# Patient Record
Sex: FEMALE | Race: WHITE | NOT HISPANIC OR LATINO
[De-identification: names, ages, dates, MRNs, and addresses within clinical notes are randomized per-mention and may not be internally consistent; named-entity substitution may affect disease eponyms.]

---

## 2021-09-27 ENCOUNTER — APPOINTMENT (OUTPATIENT)
Dept: OPHTHALMOLOGY | Facility: CLINIC | Age: 81
End: 2021-09-27

## 2023-10-16 ENCOUNTER — INPATIENT (INPATIENT)
Facility: HOSPITAL | Age: 83
LOS: 6 days | Discharge: NURSING, REHAB OR LONG-TERM CARE | DRG: 324 | End: 2023-10-23
Attending: INTERNAL MEDICINE | Admitting: INTERNAL MEDICINE
Payer: MEDICARE

## 2023-10-16 VITALS
HEART RATE: 78 BPM | TEMPERATURE: 97 F | WEIGHT: 102.96 LBS | RESPIRATION RATE: 18 BRPM | OXYGEN SATURATION: 100 % | DIASTOLIC BLOOD PRESSURE: 78 MMHG | SYSTOLIC BLOOD PRESSURE: 167 MMHG

## 2023-10-16 DIAGNOSIS — I21.4 NON-ST ELEVATION (NSTEMI) MYOCARDIAL INFARCTION: ICD-10-CM

## 2023-10-16 DIAGNOSIS — Z98.890 OTHER SPECIFIED POSTPROCEDURAL STATES: Chronic | ICD-10-CM

## 2023-10-16 DIAGNOSIS — Z87.81 PERSONAL HISTORY OF (HEALED) TRAUMATIC FRACTURE: Chronic | ICD-10-CM

## 2023-10-16 DIAGNOSIS — Z86.59 PERSONAL HISTORY OF OTHER MENTAL AND BEHAVIORAL DISORDERS: ICD-10-CM

## 2023-10-16 PROBLEM — Z00.00 ENCOUNTER FOR PREVENTIVE HEALTH EXAMINATION: Status: ACTIVE | Noted: 2023-10-16

## 2023-10-16 LAB
ALBUMIN SERPL ELPH-MCNC: 3.6 G/DL — SIGNIFICANT CHANGE UP (ref 3.3–5)
ALBUMIN SERPL ELPH-MCNC: 3.6 G/DL — SIGNIFICANT CHANGE UP (ref 3.3–5)
ALP SERPL-CCNC: 85 U/L — SIGNIFICANT CHANGE UP (ref 40–120)
ALP SERPL-CCNC: 85 U/L — SIGNIFICANT CHANGE UP (ref 40–120)
ALT FLD-CCNC: 89 U/L — HIGH (ref 10–45)
ALT FLD-CCNC: 89 U/L — HIGH (ref 10–45)
ANION GAP SERPL CALC-SCNC: 11 MMOL/L — SIGNIFICANT CHANGE UP (ref 5–17)
APTT BLD: 26.9 SEC — SIGNIFICANT CHANGE UP (ref 24.5–35.6)
AST SERPL-CCNC: 61 U/L — HIGH (ref 10–40)
AST SERPL-CCNC: 61 U/L — HIGH (ref 10–40)
BASOPHILS # BLD AUTO: 0.03 K/UL — SIGNIFICANT CHANGE UP (ref 0–0.2)
BASOPHILS NFR BLD AUTO: 0.3 % — SIGNIFICANT CHANGE UP (ref 0–2)
BILIRUB DIRECT SERPL-MCNC: 0.2 MG/DL — SIGNIFICANT CHANGE UP (ref 0–0.3)
BILIRUB DIRECT SERPL-MCNC: 0.2 MG/DL — SIGNIFICANT CHANGE UP (ref 0–0.3)
BILIRUB INDIRECT FLD-MCNC: 0 MG/DL — LOW (ref 0.2–1)
BILIRUB INDIRECT FLD-MCNC: 0 MG/DL — LOW (ref 0.2–1)
BILIRUB SERPL-MCNC: 0.2 MG/DL — SIGNIFICANT CHANGE UP (ref 0.2–1.2)
BILIRUB SERPL-MCNC: 0.2 MG/DL — SIGNIFICANT CHANGE UP (ref 0.2–1.2)
BUN SERPL-MCNC: 23 MG/DL — SIGNIFICANT CHANGE UP (ref 7–23)
CALCIUM SERPL-MCNC: 8.9 MG/DL — SIGNIFICANT CHANGE UP (ref 8.4–10.5)
CHLORIDE SERPL-SCNC: 106 MMOL/L — SIGNIFICANT CHANGE UP (ref 96–108)
CK MB CFR SERPL CALC: 4 NG/ML — SIGNIFICANT CHANGE UP (ref 0–6.7)
CK MB CFR SERPL CALC: 4 NG/ML — SIGNIFICANT CHANGE UP (ref 0–6.7)
CK MB CFR SERPL CALC: 8.4 NG/ML — HIGH (ref 0–6.7)
CK SERPL-CCNC: 242 U/L — HIGH (ref 25–170)
CK SERPL-CCNC: 94 U/L — SIGNIFICANT CHANGE UP (ref 25–170)
CK SERPL-CCNC: 94 U/L — SIGNIFICANT CHANGE UP (ref 25–170)
CO2 SERPL-SCNC: 26 MMOL/L — SIGNIFICANT CHANGE UP (ref 22–31)
CREAT SERPL-MCNC: 0.64 MG/DL — SIGNIFICANT CHANGE UP (ref 0.5–1.3)
CRP SERPL-MCNC: <3 MG/L — SIGNIFICANT CHANGE UP (ref 0–4)
CRP SERPL-MCNC: <3 MG/L — SIGNIFICANT CHANGE UP (ref 0–4)
EGFR: 87 ML/MIN/1.73M2 — SIGNIFICANT CHANGE UP
EOSINOPHIL # BLD AUTO: 0.01 K/UL — SIGNIFICANT CHANGE UP (ref 0–0.5)
EOSINOPHIL NFR BLD AUTO: 0.1 % — SIGNIFICANT CHANGE UP (ref 0–6)
ERYTHROCYTE [SEDIMENTATION RATE] IN BLOOD: 6 MM/HR — SIGNIFICANT CHANGE UP
ERYTHROCYTE [SEDIMENTATION RATE] IN BLOOD: 6 MM/HR — SIGNIFICANT CHANGE UP
GLUCOSE SERPL-MCNC: 110 MG/DL — HIGH (ref 70–99)
HCT VFR BLD CALC: 43.7 % — SIGNIFICANT CHANGE UP (ref 34.5–45)
HGB BLD-MCNC: 14.4 G/DL — SIGNIFICANT CHANGE UP (ref 11.5–15.5)
IMM GRANULOCYTES NFR BLD AUTO: 1.1 % — HIGH (ref 0–0.9)
INR BLD: 0.93 — SIGNIFICANT CHANGE UP (ref 0.85–1.18)
LYMPHOCYTES # BLD AUTO: 0.89 K/UL — LOW (ref 1–3.3)
LYMPHOCYTES # BLD AUTO: 9.5 % — LOW (ref 13–44)
MCHC RBC-ENTMCNC: 31.4 PG — SIGNIFICANT CHANGE UP (ref 27–34)
MCHC RBC-ENTMCNC: 33 GM/DL — SIGNIFICANT CHANGE UP (ref 32–36)
MCV RBC AUTO: 95.4 FL — SIGNIFICANT CHANGE UP (ref 80–100)
MONOCYTES # BLD AUTO: 0.49 K/UL — SIGNIFICANT CHANGE UP (ref 0–0.9)
MONOCYTES NFR BLD AUTO: 5.2 % — SIGNIFICANT CHANGE UP (ref 2–14)
NEUTROPHILS # BLD AUTO: 7.86 K/UL — HIGH (ref 1.8–7.4)
NEUTROPHILS NFR BLD AUTO: 83.8 % — HIGH (ref 43–77)
NRBC # BLD: 0 /100 WBCS — SIGNIFICANT CHANGE UP (ref 0–0)
NT-PROBNP SERPL-SCNC: 324 PG/ML — HIGH (ref 0–300)
PLATELET # BLD AUTO: 261 K/UL — SIGNIFICANT CHANGE UP (ref 150–400)
POTASSIUM SERPL-MCNC: 3.9 MMOL/L — SIGNIFICANT CHANGE UP (ref 3.5–5.3)
POTASSIUM SERPL-SCNC: 3.9 MMOL/L — SIGNIFICANT CHANGE UP (ref 3.5–5.3)
PROT SERPL-MCNC: 6.2 G/DL — SIGNIFICANT CHANGE UP (ref 6–8.3)
PROT SERPL-MCNC: 6.2 G/DL — SIGNIFICANT CHANGE UP (ref 6–8.3)
PROTHROM AB SERPL-ACNC: 10.6 SEC — SIGNIFICANT CHANGE UP (ref 9.5–13)
RBC # BLD: 4.58 M/UL — SIGNIFICANT CHANGE UP (ref 3.8–5.2)
RBC # FLD: 13.2 % — SIGNIFICANT CHANGE UP (ref 10.3–14.5)
SODIUM SERPL-SCNC: 143 MMOL/L — SIGNIFICANT CHANGE UP (ref 135–145)
TROPONIN T, HIGH SENSITIVITY RESULT: 206 NG/L — CRITICAL HIGH (ref 0–51)
TROPONIN T, HIGH SENSITIVITY RESULT: 259 NG/L — CRITICAL HIGH (ref 0–51)
WBC # BLD: 9.38 K/UL — SIGNIFICANT CHANGE UP (ref 3.8–10.5)
WBC # FLD AUTO: 9.38 K/UL — SIGNIFICANT CHANGE UP (ref 3.8–10.5)

## 2023-10-16 PROCEDURE — 99291 CRITICAL CARE FIRST HOUR: CPT

## 2023-10-16 PROCEDURE — 74174 CTA ABD&PLVS W/CONTRAST: CPT | Mod: 26,MC

## 2023-10-16 PROCEDURE — 71045 X-RAY EXAM CHEST 1 VIEW: CPT | Mod: 26

## 2023-10-16 PROCEDURE — 93306 TTE W/DOPPLER COMPLETE: CPT | Mod: 26

## 2023-10-16 PROCEDURE — 93010 ELECTROCARDIOGRAM REPORT: CPT

## 2023-10-16 PROCEDURE — 99223 1ST HOSP IP/OBS HIGH 75: CPT

## 2023-10-16 PROCEDURE — 71275 CT ANGIOGRAPHY CHEST: CPT | Mod: 26,MC

## 2023-10-16 RX ORDER — BUPROPION HYDROCHLORIDE 150 MG/1
300 TABLET, EXTENDED RELEASE ORAL DAILY
Refills: 0 | Status: DISCONTINUED | OUTPATIENT
Start: 2023-10-16 | End: 2023-10-23

## 2023-10-16 RX ORDER — HEPARIN SODIUM 5000 [USP'U]/ML
2900 INJECTION INTRAVENOUS; SUBCUTANEOUS EVERY 6 HOURS
Refills: 0 | Status: DISCONTINUED | OUTPATIENT
Start: 2023-10-16 | End: 2023-10-17

## 2023-10-16 RX ORDER — SERTRALINE 25 MG/1
25 TABLET, FILM COATED ORAL DAILY
Refills: 0 | Status: DISCONTINUED | OUTPATIENT
Start: 2023-10-16 | End: 2023-10-23

## 2023-10-16 RX ORDER — SODIUM CHLORIDE 9 MG/ML
1000 INJECTION INTRAMUSCULAR; INTRAVENOUS; SUBCUTANEOUS
Refills: 0 | Status: DISCONTINUED | OUTPATIENT
Start: 2023-10-16 | End: 2023-10-17

## 2023-10-16 RX ORDER — ACETAMINOPHEN 500 MG
700 TABLET ORAL ONCE
Refills: 0 | Status: COMPLETED | OUTPATIENT
Start: 2023-10-16 | End: 2023-10-16

## 2023-10-16 RX ORDER — TOPIRAMATE 25 MG
25 TABLET ORAL DAILY
Refills: 0 | Status: DISCONTINUED | OUTPATIENT
Start: 2023-10-16 | End: 2023-10-23

## 2023-10-16 RX ORDER — ASPIRIN/CALCIUM CARB/MAGNESIUM 324 MG
324 TABLET ORAL ONCE
Refills: 0 | Status: COMPLETED | OUTPATIENT
Start: 2023-10-16 | End: 2023-10-16

## 2023-10-16 RX ORDER — HEPARIN SODIUM 5000 [USP'U]/ML
INJECTION INTRAVENOUS; SUBCUTANEOUS
Qty: 25000 | Refills: 0 | Status: DISCONTINUED | OUTPATIENT
Start: 2023-10-16 | End: 2023-10-17

## 2023-10-16 RX ORDER — HEPARIN SODIUM 5000 [USP'U]/ML
2900 INJECTION INTRAVENOUS; SUBCUTANEOUS ONCE
Refills: 0 | Status: COMPLETED | OUTPATIENT
Start: 2023-10-16 | End: 2023-10-16

## 2023-10-16 RX ORDER — ATORVASTATIN CALCIUM 80 MG/1
40 TABLET, FILM COATED ORAL AT BEDTIME
Refills: 0 | Status: DISCONTINUED | OUTPATIENT
Start: 2023-10-16 | End: 2023-10-23

## 2023-10-16 RX ORDER — SODIUM CHLORIDE 9 MG/ML
1000 INJECTION INTRAMUSCULAR; INTRAVENOUS; SUBCUTANEOUS
Refills: 0 | Status: DISCONTINUED | OUTPATIENT
Start: 2023-10-16 | End: 2023-10-16

## 2023-10-16 RX ORDER — ASPIRIN/CALCIUM CARB/MAGNESIUM 324 MG
325 TABLET ORAL DAILY
Refills: 0 | Status: DISCONTINUED | OUTPATIENT
Start: 2023-10-16 | End: 2023-10-17

## 2023-10-16 RX ADMIN — Medication 280 MILLIGRAM(S): at 12:32

## 2023-10-16 RX ADMIN — ATORVASTATIN CALCIUM 40 MILLIGRAM(S): 80 TABLET, FILM COATED ORAL at 22:02

## 2023-10-16 RX ADMIN — HEPARIN SODIUM 2900 UNIT(S): 5000 INJECTION INTRAVENOUS; SUBCUTANEOUS at 18:29

## 2023-10-16 RX ADMIN — SODIUM CHLORIDE 75 MILLILITER(S): 9 INJECTION INTRAMUSCULAR; INTRAVENOUS; SUBCUTANEOUS at 17:44

## 2023-10-16 RX ADMIN — HEPARIN SODIUM 600 UNIT(S)/HR: 5000 INJECTION INTRAVENOUS; SUBCUTANEOUS at 18:30

## 2023-10-16 RX ADMIN — Medication 325 MILLIGRAM(S): at 15:47

## 2023-10-16 RX ADMIN — Medication 700 MILLIGRAM(S): at 12:44

## 2023-10-16 RX ADMIN — Medication 700 MILLIGRAM(S): at 13:10

## 2023-10-16 NOTE — ED ADULT NURSE NOTE - OBJECTIVE STATEMENT
Pt A&Ox4 presents to ED with chest pain radiating to bilateral arms starting this morning. Pt breathing unlabored on room air. Pt BIBEMS and has 18G PIV in left forearm. EKG obtained. Placed on cardiac monitor. Denies shortness of breath, nausea/vomiting, fevers/chills, urinary symptoms. Pt A&Ox4 presents to ED with chest pain radiating to bilateral arms starting this morning. Pt reports she also feels the pain in her back. Pt breathing unlabored on room air. Pt BIBEMS and has 18G PIV in left forearm. EKG obtained. Placed on cardiac monitor. Denies shortness of breath, nausea/vomiting, fevers/chills, urinary symptoms, weakness, trauma.

## 2023-10-16 NOTE — ED ADULT NURSE NOTE - NSFALLHARMRISKINTERV_ED_ALL_ED

## 2023-10-16 NOTE — H&P ADULT - PROBLEM SELECTOR PLAN 2
continue home regimen ( all meds as per patient's pharmacy, verified) continue home regimen with sertaline, wellbutrin and topiramate ( all meds and dosages verified with patient's pharmacy)

## 2023-10-16 NOTE — ED ADULT TRIAGE NOTE - CHIEF COMPLAINT QUOTE
" I have chest pain that go to both of my arms since this morning. I also feel shaky and weak." hx. Raina's diseae " I have chest pain that go to both of my arms since this morning. I also feel shaky and weak." hx. Meniere's disease. 250ml NS IV given by EMS

## 2023-10-16 NOTE — H&P ADULT - ASSESSMENT
84 y/o F with PMH  Meniere's disease (getting worse as per patient), hx anxiety disorder and no prior cardiac hx, who was BIBA to [facility] ED 10/16/23 with new onset of chest discomfort since this morning 82 y/o F with PMH  Meniere's disease, hx anxiety disorder and no prior cardiac hx, who was BIBA to [facility] ED 10/16/23 with new onset of chest discomfort since this morning 84 y/o F with PMH  Meniere's disease (getting worse as per patient), vertigo and  poor balance, s/p  traumatic falls earlier this year,  hx anxiety disorder and no prior cardiac hx, who was BIBA to [facility] ED 10/16/23 with new onset of chest discomfort since this morning.

## 2023-10-16 NOTE — ED PROVIDER NOTE - OBJECTIVE STATEMENT
82 y/o f with hx of Meniere's disease, vestibular dysfunction, anxiety presents to ED with midsternal cp radiating to back - no prior hx of similar pain - no other associated symptoms of shortness of breath, extremity weakness or numbness, no trauma.  Pain worse with position and movement.  No fever or infectious symptoms.

## 2023-10-16 NOTE — ED ADULT NURSE NOTE - CHIEF COMPLAINT QUOTE
" I have chest pain that go to both of my arms since this morning. I also feel shaky and weak." hx. Meniere's disease. 250ml NS IV given by EMS

## 2023-10-16 NOTE — ED ADULT TRIAGE NOTE - GLASGOW COMA SCALE: BEST MOTOR RESPONSE, MLM
Patient is calling in regards to test results, labs and x-rays from her previous visit.  
Spoke to patient and informed patient of positive urine cx results.  Instructed to complete prescribed abx and f/u with PCP if symptoms failed to resolve.  Patient verbalized understanding.  
(M6) obeys commands

## 2023-10-16 NOTE — H&P ADULT - PROBLEM SELECTOR PLAN 1
R/I for NSTEMI with trending up Troponin T --> 259 and elevated CKMB 8.4, CPK  242, normal CRP  TTE done at bedside - nl EF and no wall motion abnormalities, no effusion     even though CP appears atypical, reproducible on exam with palpation and worse will movement, as d/w with Dr Mai plan is to initiate heparin IV ACS protocol  start high dose statin ( 40mg given age)  no nitrates or BB at present for possibility of RCA involvement  remains HD stable R/I for NSTEMI with trending up Troponin T --> 259 and elevated CKMB 8.4, CPK  242, normal CRP  TTE done at bedside - nl EF and no wall motion abnormalities, no effusion     even though CP appears atypical, reproducible on exam with palpation and worse wilth movement, as d/w with Dr Mai plan is to initiate heparin IV ACS protocol  start high dose statin ( 40mg given advance age), continue asa  no nitrates or BB at present for possibility of RCA involvement  remains HD stable  trend troponins, F/UP LFTS and lipids in am R/I for NSTEMI with trending up Troponin T --> 259 and elevated CKMB 8.4, CPK  242, normal CRP  TTE done at bedside - nl EF and no wall motion abnormalities, no effusion     even though CP appears atypical, reproducible on exam with palpation and worse wilth movement, as d/w with Dr Mai plan is to initiate heparin IV ACS protocol  start high dose statin ( 40mg given advance age), continue asa  no nitrates or BB at present for possibility of RCA involvement  remains HD stable  trend troponins,  need for cath TBD in am - made NPO after MN and IVF ordered at 75 cc/h x 12 hrs as pt got CTA dye load R/I for NSTEMI with trending up Troponin T --> 259 and elevated CKMB 8.4, CPK  242, normal CRP  TTE done at bedside - nl EF and no wall motion abnormalities, no effusion     even though CP appears atypical, reproducible on exam with palpation and worse with movement favoring pericarditis, as d/w with Dr Mai plan is to initiate heparin IV ACS protocol and trend CE  start high dose statin ( 40mg given advance age), continue asa  no nitrates or BB at present for possibility of RCA involvement   remains HD stable  trend troponins,  need for cath TBD in am - made NPO after MN and IVF ordered at 75 cc/h x 12 hrs as pt got CTA dye load

## 2023-10-16 NOTE — ED PROVIDER NOTE - CLINICAL SUMMARY MEDICAL DECISION MAKING FREE TEXT BOX
Pt with severe cp - no prior hx radiating to back - immediately taken to CT for r/o dissection - no dissection or PE, Trop elevated in ED - EKG nonischemic.  Discussed with cards who agrees on admission for further r/o ACS.    Pericarditis vs ACS - mgmt and heparin drip as per cards team

## 2023-10-16 NOTE — H&P ADULT - NSHPOUTPATIENTPROVIDERS_GEN_ALL_CORE
Dr Urrutia internist ( [facility])  [phone], neurologist Dr Guerra [phone]

## 2023-10-16 NOTE — H&P ADULT - NSHPLABSRESULTS_GEN_ALL_CORE
CARDIAC MARKERS ( 16 Oct 2023 11:50 )  x     / x     /  U/L / x     / CK MB 8.4 ng/mL    troponin T --> 250                          14.4   9.38  )-----------( 261      ( 16 Oct 2023 11:50 )             43.7   10-16    143  |  106  |  23  ----------------------------<  110<H>  3.9   |  26  |  0.64    Ca    8.9      16 Oct 2023 11:50    PT/INR - ( 16 Oct 2023 11:50 )   PT: 10.6 sec;   INR: 0.93          PTT - ( 16 Oct 2023 11:50 )  PTT:26.9 sec

## 2023-10-16 NOTE — H&P ADULT - MUSCULOSKELETAL
ROM intact/no joint swelling/no joint erythema/strength 5/5 bilateral upper extremities/strength 5/5 bilateral lower extremities

## 2023-10-16 NOTE — H&P ADULT - HISTORY OF PRESENT ILLNESS
82 y/o F with PMH  Meniere's disease (getting worse as per patient), hx anxiety disorder and no prior cardiac hx, who was BIBA to [facility] ED 10/16/23 with new onset of chest discomfort since this morning.     At baseline patient is independent and lives alone, ambulates with walker secondary to vertigo and balance issues, recently saw several neuro specialist for progression of her neuro symptoms and memory decline. Patient said she woke up this morning not feeling well generally but without any CP, CP  started after she got up and walked and got back to bed. She describes 10/10  crushing chest discomfort all over precordium going to her upper back and shoulders with movement, better at rest, + dizziness ( not usual vertigo). Denies N/V, diaphoresis, syncope. Denies similar symptoms in the past, denies any recent viral illness. Says had "normal"  cardiac work up within past year including TTE and NST ( no reports available at the time of admission).     In ED with c/o crushing  CP  on movement that is reproducible, /78, HR 78, RR 20, O2 sat 100% RA, EKG with  NSR and no ischemic changes, CTA  chest negative for PE and dissection   given IV tylenol 700mg in ED with no reported improvement but appears comfortable at rest  troponin HS T 206--> 250, CK MB and CPK  82 y/o F with PMH  Meniere's disease (getting worse as per patient), hx anxiety disorder and no prior cardiac hx, who was BIBA to [facility] ED 10/16/23 with new onset of chest discomfort since this morning.     At baseline patient is independent and lives alone, ambulates with walker secondary to vertigo and balance issues, recently saw several neuro specialist for progression of her neuro symptoms and memory decline. Patient said she woke up this morning not feeling well generally but without any CP, CP  started after she got up and walked and got back to bed. She describes 10/10  crushing chest discomfort all over precordium going to her upper back and shoulders with movement, better at rest, + dizziness ( not usual vertigo). Denies N/V, diaphoresis, syncope. Denies similar symptoms in the past, denies any recent viral illness. Says had "normal"  cardiac work up within past year including TTE and NST ( no reports available at the time of admission).     In ED with c/o crushing  CP  on movement that is reproducible, /78, HR 78, RR 20, O2 sat 100% RA, EKG with  NSR and no ischemic changes, CTA  chest negative for PE and dissection   given IV tylenol 700mg in ED with no reported improvement but appears comfortable at rest  troponin HS T 206--> 259, CK MB elevated at  8.4  and   given asa 324mg    STAT portable TTE ordered to look for WMA, heparin drip is deferred until ECHO is done 82 y/o F with PMH  Meniere's disease (getting worse as per patient), hx anxiety disorder and no prior cardiac hx, who was BIBA to [facility] ED 10/16/23 with new onset of chest discomfort since this morning.     At baseline patient is independent and lives alone, ambulates with walker secondary to vertigo and balance issues, recently saw several neuro specialist for progression of her neuro symptoms and memory decline. Patient said she woke up this morning not feeling well generally but without any CP, CP  started after she got up and walked and got back to bed. She describes 10/10  crushing chest discomfort all over precordium going to her upper back and shoulders with movement, better at rest, + dizziness ( not usual vertigo). Denies N/V, diaphoresis, syncope. Denies similar symptoms in the past, denies any recent viral illness. Says had "normal"  cardiac work up within past year including TTE and NST ( no reports available at the time of admission).     In ED with c/o crushing  CP  on movement that is reproducible, /78, HR 78, RR 20, O2 sat 100% RA, EKG with  NSR and no ischemic changes, CTA  chest negative for PE and dissection   given IV tylenol 700mg in ED with no reported improvement but appears comfortable at rest  troponin HS T 206--> 259, CK MB elevated at  8.4  and   given asa 324mg    Patient is admitted to tele/cards Dr Mai.  As d/w Attending, STAT portable TTE ordered to look for WMA, heparin drip is deferred until ECHO is done 82 y/o F with PMH  Meniere's disease (getting worse as per patient), hx anxiety disorder and no prior cardiac hx, who was BIBA to [facility] ED 10/16/23 with new onset of chest discomfort since this morning.     At baseline patient is independent and lives alone, ambulates with walker secondary to vertigo and balance issues, recently saw several neuro specialist for progression of her neuro symptoms and memory decline. Patient said she woke up this morning not feeling well generally but without any CP, CP  started after she got up and walked and got back to bed. She describes 10/10  crushing chest discomfort all over precordium going to her upper back and shoulders with movement, better at rest, + dizziness ( not usual vertigo). Denies N/V, diaphoresis, syncope. Denies similar symptoms in the past, denies any recent viral illness. Says had "normal"  cardiac work up within past year including TTE and NST ( no reports available at the time of admission).     In ED with c/o crushing  CP  on movement that is reproducible, /78, HR 78, RR 20, O2 sat 100% RA, EKG with  NSR and no ischemic changes, CTA  chest negative for PE and dissection   given IV tylenol 700mg in ED with no reported improvement but appears comfortable at rest  troponin HS T 206--> 259, CK MB elevated at  8.4  and   given asa 324mg    Patient is admitted to tele/cards Dr Mai.  As d/w Attending, STAT portable TTE ordered to look for WMA, heparin drip is deferred until ECHO is done    Update at  17:40PM  TTE done:  1. Normal left and right ventricular size and systolic function. NO WMA   2. No significant valvular disease.   3. No evidence of pulmonary hypertension.   4. No pericardial effusion. 84 y/o F with PMH  Meniere's disease (getting worse as per patient), hx anxiety disorder and no prior cardiac hx, who was BIBA to [facility] ED 10/16/23 with new onset of chest discomfort since this morning.     At baseline patient is independent and lives alone, ambulates with walker secondary to vertigo and balance issues, recently saw several neuro specialist for progression of her neuro symptoms and memory decline. Patient said she woke up this morning not feeling well generally but without any CP, CP  started after she got up and walked and got back to bed. She describes 10/10  crushing chest discomfort all over precordium going to her upper back and shoulders with movement, better at rest, + dizziness ( not usual vertigo). Denies N/V, diaphoresis, syncope. Denies similar symptoms in the past, denies any recent viral illness. Says had "normal"  cardiac work up within past year including TTE and NST ( no reports available at the time of admission).     In ED with c/o crushing  CP  on movement that is reproducible, /78, HR 78, RR 20, O2 sat 100% RA, EKG with  NSR and no ischemic changes, CTA  chest negative for PE and dissection   given IV tylenol 700mg in ED with no reported improvement but appears comfortable at rest  troponin HS T 206--> 259, CK MB elevated at  8.4  and   given asa 324mg to chew     Patient is admitted to tele/cards Dr Mai.  As d/w Attending, STAT portable TTE ordered to look for WMA, heparin drip is deferred until ECHO is done    Update at  17:40PM  TTE done at bedside :  1. Normal left and right ventricular size and systolic function. NO WMA   2. No significant valvular disease.   3. No evidence of pulmonary hypertension.   4. No pericardial effusion. 84 y/o F with PMH  Meniere's disease (getting worse as per patient), vertigo and  poor balance, s/p  traumatic falls earlier this year,  hx anxiety disorder and no prior cardiac hx, who was BIBA to [facility] ED 10/16/23 with new onset of chest discomfort since this morning.     At baseline patient is independent and lives alone, ambulates with walker secondary to vertigo and balance issues, recently saw several neuro specialist for progression of her neuro symptoms and memory decline. Patient said she woke up this morning not feeling well generally but without any CP, CP  started after she got up and walked and got back to bed. She describes 10/10  crushing chest discomfort all over precordium going to her upper back and shoulders with movement, better at rest, + dizziness ( not usual vertigo). Denies N/V, diaphoresis, syncope. Denies similar symptoms in the past, denies any recent viral illness. Says had "normal"  cardiac work up within past year including TTE and NST ( no reports available at the time of admission).     In ED with c/o crushing  CP  on movement that is reproducible, /78, HR 78, RR 20, O2 sat 100% RA, EKG with  NSR and no ischemic changes, CTA  chest negative for PE and dissection   given IV tylenol 700mg in ED with no reported improvement but appears comfortable at rest  troponin HS T 206--> 259, CK MB elevated at  8.4  and   given asa 324mg to chew     Patient is admitted to tele/cards Dr Mai.  As d/w Attending, STAT portable TTE ordered to look for WMA, heparin drip is deferred until ECHO is done    Update at  17:40PM  TTE done at bedside :  1. Normal left and right ventricular size and systolic function. NO WMA   2. No significant valvular disease.   3. No evidence of pulmonary hypertension.   4. No pericardial effusion. 82 y/o F with PMH  Meniere's disease (getting worse as per patient), vertigo and  poor balance, s/p  traumatic falls earlier this year,  hx anxiety disorder and no prior cardiac hx, who was BIBA to [facility] ED 10/16/23 with new onset of chest discomfort since this morning.     At baseline patient is independent and lives alone, ambulates with walker secondary to vertigo and balance issues, recently saw several neuro specialist for progression of her neuro symptoms and memory decline. Patient said she woke up this morning not feeling well generally but without any CP, CP  started after she got up and walked and got back to bed. She describes 10/10  crushing chest discomfort all over precordium going to her upper back and shoulders with movement, better at rest, + dizziness ( not usual vertigo). Denies N/V, diaphoresis, syncope. Denies similar symptoms in the past, denies any recent viral illness. Says had "normal"  cardiac work up within past year including TTE and NST ( no reports available at the time of admission).     In ED with c/o crushing  CP  on movement that is reproducible, /78 -->143/78, HR 78, RR 20, O2 sat 100% RA, EKG with  NSR and no ischemic changes, CTA  chest negative for PE and dissection   given IV tylenol 700mg in ED with no reported improvement but appears comfortable at rest  troponin HS T 206--> 259, CK MB elevated at  8.4  and   given asa 324mg to chew     Patient is admitted to tele/cards Dr Mai.  As d/w Attending, STAT portable TTE ordered to look for WMA, heparin drip is deferred until ECHO is done    Update at  17:40PM  TTE done at bedside :  1. Normal left and right ventricular size and systolic function. NO WMA   2. No significant valvular disease.   3. No evidence of pulmonary hypertension.   4. No pericardial effusion. 84 y/o F with PMH  Meniere's disease (getting worse as per patient), vertigo and  poor balance, s/p  traumatic falls earlier this year,  hx anxiety disorder and no prior cardiac hx, who was BIBA to [facility] ED 10/16/23 with new onset of chest discomfort since this morning.     At baseline patient is independent and lives alone, ambulates with walker secondary to vertigo and balance issues, recently saw several neuro specialist for progression of her neuro symptoms and memory decline. Patient said she woke up this morning not feeling well generally but without any CP, CP  started after she got up and walked and got back to bed. She describes 10/10  crushing chest discomfort all over precordium going to her upper back and shoulders with movement, better at rest, + dizziness ( not usual vertigo). Denies N/V, diaphoresis, syncope. Denies similar symptoms in the past, denies any recent viral illness. Says had "normal"  cardiac work up within past year including TTE and NST ( no reports available at the time of admission).     In ED with c/o crushing  CP  on movement that is reproducible, /78 -->143/78, HR 78, RR 20, O2 sat 100% RA, EKG with  NSR and no ischemic changes, CTA  chest negative for PE and dissection   given IV tylenol 700mg in ED with no reported improvement but appears comfortable at rest  troponin HS T 206--> 259, CK MB elevated at  8.4  and   given asa 324mg to chew     Patient is admitted to tele/cards Dr Mai.  As d/w Attending, STAT portable TTE ordered to look for WMA, heparin drip was  deferred until ECHO is done    Update at  17:40PM  TTE done at bedside :  1. Normal left and right ventricular size and systolic function. NO WMA   2. No significant valvular disease.   3. No evidence of pulmonary hypertension.   4. No pericardial effusion.

## 2023-10-16 NOTE — H&P ADULT - NEUROLOGICAL
normal/cranial nerves II-XII intact/sensation intact diet modification/exercise diet modification/exercise/medication therapy

## 2023-10-17 DIAGNOSIS — Z29.9 ENCOUNTER FOR PROPHYLACTIC MEASURES, UNSPECIFIED: ICD-10-CM

## 2023-10-17 LAB
A1C WITH ESTIMATED AVERAGE GLUCOSE RESULT: 4.7 % — SIGNIFICANT CHANGE UP (ref 4–5.6)
A1C WITH ESTIMATED AVERAGE GLUCOSE RESULT: 4.7 % — SIGNIFICANT CHANGE UP (ref 4–5.6)
ALBUMIN SERPL ELPH-MCNC: 3.6 G/DL — SIGNIFICANT CHANGE UP (ref 3.3–5)
ALBUMIN SERPL ELPH-MCNC: 3.6 G/DL — SIGNIFICANT CHANGE UP (ref 3.3–5)
ALP SERPL-CCNC: 78 U/L — SIGNIFICANT CHANGE UP (ref 40–120)
ALP SERPL-CCNC: 78 U/L — SIGNIFICANT CHANGE UP (ref 40–120)
ALT FLD-CCNC: 76 U/L — HIGH (ref 10–45)
ALT FLD-CCNC: 76 U/L — HIGH (ref 10–45)
ANION GAP SERPL CALC-SCNC: 8 MMOL/L — SIGNIFICANT CHANGE UP (ref 5–17)
ANION GAP SERPL CALC-SCNC: 8 MMOL/L — SIGNIFICANT CHANGE UP (ref 5–17)
APTT BLD: 53.9 SEC — HIGH (ref 24.5–35.6)
APTT BLD: 53.9 SEC — HIGH (ref 24.5–35.6)
APTT BLD: 64.2 SEC — HIGH (ref 24.5–35.6)
APTT BLD: 64.2 SEC — HIGH (ref 24.5–35.6)
APTT BLD: 78.6 SEC — HIGH (ref 24.5–35.6)
APTT BLD: 78.6 SEC — HIGH (ref 24.5–35.6)
AST SERPL-CCNC: 47 U/L — HIGH (ref 10–40)
AST SERPL-CCNC: 47 U/L — HIGH (ref 10–40)
BILIRUB DIRECT SERPL-MCNC: 0.2 MG/DL — SIGNIFICANT CHANGE UP (ref 0–0.3)
BILIRUB DIRECT SERPL-MCNC: 0.2 MG/DL — SIGNIFICANT CHANGE UP (ref 0–0.3)
BILIRUB INDIRECT FLD-MCNC: 0.2 MG/DL — SIGNIFICANT CHANGE UP (ref 0.2–1)
BILIRUB INDIRECT FLD-MCNC: 0.2 MG/DL — SIGNIFICANT CHANGE UP (ref 0.2–1)
BILIRUB SERPL-MCNC: 0.4 MG/DL — SIGNIFICANT CHANGE UP (ref 0.2–1.2)
BILIRUB SERPL-MCNC: 0.4 MG/DL — SIGNIFICANT CHANGE UP (ref 0.2–1.2)
BUN SERPL-MCNC: 22 MG/DL — SIGNIFICANT CHANGE UP (ref 7–23)
BUN SERPL-MCNC: 22 MG/DL — SIGNIFICANT CHANGE UP (ref 7–23)
CALCIUM SERPL-MCNC: 8.8 MG/DL — SIGNIFICANT CHANGE UP (ref 8.4–10.5)
CALCIUM SERPL-MCNC: 8.8 MG/DL — SIGNIFICANT CHANGE UP (ref 8.4–10.5)
CHLORIDE SERPL-SCNC: 105 MMOL/L — SIGNIFICANT CHANGE UP (ref 96–108)
CHLORIDE SERPL-SCNC: 105 MMOL/L — SIGNIFICANT CHANGE UP (ref 96–108)
CHOLEST SERPL-MCNC: 177 MG/DL — SIGNIFICANT CHANGE UP
CHOLEST SERPL-MCNC: 177 MG/DL — SIGNIFICANT CHANGE UP
CK MB CFR SERPL CALC: 2.4 NG/ML — SIGNIFICANT CHANGE UP (ref 0–6.7)
CK MB CFR SERPL CALC: 2.4 NG/ML — SIGNIFICANT CHANGE UP (ref 0–6.7)
CK SERPL-CCNC: 69 U/L — SIGNIFICANT CHANGE UP (ref 25–170)
CK SERPL-CCNC: 69 U/L — SIGNIFICANT CHANGE UP (ref 25–170)
CO2 SERPL-SCNC: 25 MMOL/L — SIGNIFICANT CHANGE UP (ref 22–31)
CO2 SERPL-SCNC: 25 MMOL/L — SIGNIFICANT CHANGE UP (ref 22–31)
CREAT SERPL-MCNC: 0.58 MG/DL — SIGNIFICANT CHANGE UP (ref 0.5–1.3)
CREAT SERPL-MCNC: 0.58 MG/DL — SIGNIFICANT CHANGE UP (ref 0.5–1.3)
CRP SERPL HS-MCNC: 3.28 MG/L — HIGH (ref 0–3)
CRP SERPL HS-MCNC: 3.28 MG/L — HIGH (ref 0–3)
EGFR: 89 ML/MIN/1.73M2 — SIGNIFICANT CHANGE UP
EGFR: 89 ML/MIN/1.73M2 — SIGNIFICANT CHANGE UP
ESTIMATED AVERAGE GLUCOSE: 88 MG/DL — SIGNIFICANT CHANGE UP (ref 68–114)
ESTIMATED AVERAGE GLUCOSE: 88 MG/DL — SIGNIFICANT CHANGE UP (ref 68–114)
GLUCOSE SERPL-MCNC: 104 MG/DL — HIGH (ref 70–99)
GLUCOSE SERPL-MCNC: 104 MG/DL — HIGH (ref 70–99)
HCT VFR BLD CALC: 36.2 % — SIGNIFICANT CHANGE UP (ref 34.5–45)
HCT VFR BLD CALC: 36.2 % — SIGNIFICANT CHANGE UP (ref 34.5–45)
HDLC SERPL-MCNC: 66 MG/DL — SIGNIFICANT CHANGE UP
HDLC SERPL-MCNC: 66 MG/DL — SIGNIFICANT CHANGE UP
HGB BLD-MCNC: 12.1 G/DL — SIGNIFICANT CHANGE UP (ref 11.5–15.5)
HGB BLD-MCNC: 12.1 G/DL — SIGNIFICANT CHANGE UP (ref 11.5–15.5)
LIPID PNL WITH DIRECT LDL SERPL: 103 MG/DL — HIGH
LIPID PNL WITH DIRECT LDL SERPL: 103 MG/DL — HIGH
MAGNESIUM SERPL-MCNC: 2.1 MG/DL — SIGNIFICANT CHANGE UP (ref 1.6–2.6)
MAGNESIUM SERPL-MCNC: 2.1 MG/DL — SIGNIFICANT CHANGE UP (ref 1.6–2.6)
MCHC RBC-ENTMCNC: 31.7 PG — SIGNIFICANT CHANGE UP (ref 27–34)
MCHC RBC-ENTMCNC: 31.7 PG — SIGNIFICANT CHANGE UP (ref 27–34)
MCHC RBC-ENTMCNC: 33.4 GM/DL — SIGNIFICANT CHANGE UP (ref 32–36)
MCHC RBC-ENTMCNC: 33.4 GM/DL — SIGNIFICANT CHANGE UP (ref 32–36)
MCV RBC AUTO: 94.8 FL — SIGNIFICANT CHANGE UP (ref 80–100)
MCV RBC AUTO: 94.8 FL — SIGNIFICANT CHANGE UP (ref 80–100)
NON HDL CHOLESTEROL: 111 MG/DL — SIGNIFICANT CHANGE UP
NON HDL CHOLESTEROL: 111 MG/DL — SIGNIFICANT CHANGE UP
NRBC # BLD: 0 /100 WBCS — SIGNIFICANT CHANGE UP (ref 0–0)
NRBC # BLD: 0 /100 WBCS — SIGNIFICANT CHANGE UP (ref 0–0)
PHOSPHATE SERPL-MCNC: 2.6 MG/DL — SIGNIFICANT CHANGE UP (ref 2.5–4.5)
PHOSPHATE SERPL-MCNC: 2.6 MG/DL — SIGNIFICANT CHANGE UP (ref 2.5–4.5)
PLATELET # BLD AUTO: 205 K/UL — SIGNIFICANT CHANGE UP (ref 150–400)
PLATELET # BLD AUTO: 205 K/UL — SIGNIFICANT CHANGE UP (ref 150–400)
POTASSIUM SERPL-MCNC: 3.4 MMOL/L — LOW (ref 3.5–5.3)
POTASSIUM SERPL-MCNC: 3.4 MMOL/L — LOW (ref 3.5–5.3)
POTASSIUM SERPL-SCNC: 3.4 MMOL/L — LOW (ref 3.5–5.3)
POTASSIUM SERPL-SCNC: 3.4 MMOL/L — LOW (ref 3.5–5.3)
PROT SERPL-MCNC: 5.6 G/DL — LOW (ref 6–8.3)
PROT SERPL-MCNC: 5.6 G/DL — LOW (ref 6–8.3)
RBC # BLD: 3.82 M/UL — SIGNIFICANT CHANGE UP (ref 3.8–5.2)
RBC # BLD: 3.82 M/UL — SIGNIFICANT CHANGE UP (ref 3.8–5.2)
RBC # FLD: 13.3 % — SIGNIFICANT CHANGE UP (ref 10.3–14.5)
RBC # FLD: 13.3 % — SIGNIFICANT CHANGE UP (ref 10.3–14.5)
SODIUM SERPL-SCNC: 138 MMOL/L — SIGNIFICANT CHANGE UP (ref 135–145)
SODIUM SERPL-SCNC: 138 MMOL/L — SIGNIFICANT CHANGE UP (ref 135–145)
TRIGL SERPL-MCNC: 40 MG/DL — SIGNIFICANT CHANGE UP
TRIGL SERPL-MCNC: 40 MG/DL — SIGNIFICANT CHANGE UP
TROPONIN T, HIGH SENSITIVITY RESULT: 59 NG/L — CRITICAL HIGH (ref 0–51)
TROPONIN T, HIGH SENSITIVITY RESULT: 59 NG/L — CRITICAL HIGH (ref 0–51)
TROPONIN T, HIGH SENSITIVITY RESULT: 84 NG/L — CRITICAL HIGH (ref 0–51)
TROPONIN T, HIGH SENSITIVITY RESULT: 84 NG/L — CRITICAL HIGH (ref 0–51)
WBC # BLD: 6.72 K/UL — SIGNIFICANT CHANGE UP (ref 3.8–10.5)
WBC # BLD: 6.72 K/UL — SIGNIFICANT CHANGE UP (ref 3.8–10.5)
WBC # FLD AUTO: 6.72 K/UL — SIGNIFICANT CHANGE UP (ref 3.8–10.5)
WBC # FLD AUTO: 6.72 K/UL — SIGNIFICANT CHANGE UP (ref 3.8–10.5)

## 2023-10-17 PROCEDURE — 75574 CT ANGIO HRT W/3D IMAGE: CPT | Mod: 26

## 2023-10-17 PROCEDURE — 99233 SBSQ HOSP IP/OBS HIGH 50: CPT

## 2023-10-17 RX ORDER — ACETAMINOPHEN 500 MG
700 TABLET ORAL ONCE
Refills: 0 | Status: COMPLETED | OUTPATIENT
Start: 2023-10-17 | End: 2023-10-17

## 2023-10-17 RX ORDER — POTASSIUM CHLORIDE 20 MEQ
40 PACKET (EA) ORAL ONCE
Refills: 0 | Status: COMPLETED | OUTPATIENT
Start: 2023-10-17 | End: 2023-10-17

## 2023-10-17 RX ORDER — ENOXAPARIN SODIUM 100 MG/ML
40 INJECTION SUBCUTANEOUS EVERY 24 HOURS
Refills: 0 | Status: DISCONTINUED | OUTPATIENT
Start: 2023-10-17 | End: 2023-10-19

## 2023-10-17 RX ORDER — ACETAMINOPHEN 500 MG
650 TABLET ORAL EVERY 6 HOURS
Refills: 0 | Status: DISCONTINUED | OUTPATIENT
Start: 2023-10-17 | End: 2023-10-21

## 2023-10-17 RX ORDER — INFLUENZA VIRUS VACCINE 15; 15; 15; 15 UG/.5ML; UG/.5ML; UG/.5ML; UG/.5ML
0.7 SUSPENSION INTRAMUSCULAR ONCE
Refills: 0 | Status: DISCONTINUED | OUTPATIENT
Start: 2023-10-17 | End: 2023-10-23

## 2023-10-17 RX ADMIN — ATORVASTATIN CALCIUM 40 MILLIGRAM(S): 80 TABLET, FILM COATED ORAL at 22:16

## 2023-10-17 RX ADMIN — HEPARIN SODIUM 550 UNIT(S)/HR: 5000 INJECTION INTRAVENOUS; SUBCUTANEOUS at 01:29

## 2023-10-17 RX ADMIN — Medication 40 MILLIEQUIVALENT(S): at 10:20

## 2023-10-17 RX ADMIN — Medication 650 MILLIGRAM(S): at 22:15

## 2023-10-17 RX ADMIN — Medication 280 MILLIGRAM(S): at 01:43

## 2023-10-17 RX ADMIN — BUPROPION HYDROCHLORIDE 300 MILLIGRAM(S): 150 TABLET, EXTENDED RELEASE ORAL at 12:12

## 2023-10-17 RX ADMIN — Medication 0.5 MILLIGRAM(S): at 22:15

## 2023-10-17 RX ADMIN — Medication 650 MILLIGRAM(S): at 23:15

## 2023-10-17 RX ADMIN — ENOXAPARIN SODIUM 40 MILLIGRAM(S): 100 INJECTION SUBCUTANEOUS at 15:53

## 2023-10-17 RX ADMIN — Medication 25 MILLIGRAM(S): at 12:13

## 2023-10-17 RX ADMIN — SERTRALINE 25 MILLIGRAM(S): 25 TABLET, FILM COATED ORAL at 12:12

## 2023-10-17 RX ADMIN — Medication 700 MILLIGRAM(S): at 02:30

## 2023-10-17 RX ADMIN — Medication 650 MILLIGRAM(S): at 10:10

## 2023-10-17 RX ADMIN — Medication 325 MILLIGRAM(S): at 12:13

## 2023-10-17 RX ADMIN — HEPARIN SODIUM 550 UNIT(S)/HR: 5000 INJECTION INTRAVENOUS; SUBCUTANEOUS at 09:27

## 2023-10-17 RX ADMIN — Medication 650 MILLIGRAM(S): at 09:36

## 2023-10-17 NOTE — PROGRESS NOTE ADULT - PROBLEM SELECTOR PLAN 2
continue home regimen with sertaline, wellbutrin and topiramate ( all meds and dosages verified with patient's pharmacy) Home med: wellbutrin 300mg qD, sertraline 25mg qD, topiramate 25mg qD    - c/w home med

## 2023-10-17 NOTE — PROGRESS NOTE ADULT - SUBJECTIVE AND OBJECTIVE BOX
INTERVAL HPI/OVERNIGHT EVENTS: tasha    SUBJECTIVE: Patient seen and examined at bedside, resting comfortably in bed, and does not appear to be in any acute distress. Patient states that she has 10/10 chest pain that radiates to the back thats worse with breathing.  Patient denies any shortness of breath, headaches, nausea, or abdominal pain.    ANTIBIOTICS/RELEVANT:    MEDICATIONS  (STANDING):  atorvastatin 40 milliGRAM(s) Oral at bedtime  buPROPion XL (24-Hour) . 300 milliGRAM(s) Oral daily  enoxaparin Injectable 40 milliGRAM(s) SubCutaneous every 24 hours  influenza  Vaccine (HIGH DOSE) 0.7 milliLiter(s) IntraMuscular once  sertraline 25 milliGRAM(s) Oral daily  topiramate 25 milliGRAM(s) Oral daily    MEDICATIONS  (PRN):  acetaminophen     Tablet .. 650 milliGRAM(s) Oral every 6 hours PRN Temp greater or equal to 38C (100.4F), Mild Pain (1 - 3)      Vital Signs Last 24 Hrs  T(C): 36.9 (17 Oct 2023 12:41), Max: 37.1 (17 Oct 2023 05:15)  T(F): 98.5 (17 Oct 2023 12:41), Max: 98.7 (17 Oct 2023 05:15)  HR: 69 (17 Oct 2023 12:16) (69 - 94)  BP: 157/70 (17 Oct 2023 12:16) (129/63 - 157/70)  BP(mean): 100 (17 Oct 2023 12:16) (88 - 100)  RR: 30 (17 Oct 2023 12:16) (18 - 30)  SpO2: 94% (17 Oct 2023 12:16) (94% - 99%)    Parameters below as of 17 Oct 2023 12:16  Patient On (Oxygen Delivery Method): room air        PHYSICAL EXAM:  General: in no acute distress  Eyes: EOMI intact bilaterally.  HENT: Moist mucous membranes  Neck: Trachea midline, supple  Lungs: CTA B/L. No wheezes, rales, or rhonchi  Cardiovascular: RRR. No murmurs, rubs, or gallops, no friction rub, reproducible pain  Abdomen: Soft, non-tender non-distended; No rebound or guarding  Extremities: WWP  Neurological: Alert and oriented  Skin: Warm and dry    LABS:                        12.1   6.72  )-----------( 205      ( 17 Oct 2023 05:30 )             36.2     10-17    138  |  105  |  22  ----------------------------<  104<H>  3.4<L>   |  25  |  0.58    Ca    8.8      17 Oct 2023 05:30  Phos  2.6     10-17  Mg     2.1     10-17    TPro  5.6<L>  /  Alb  3.6  /  TBili  0.4  /  DBili  0.2  /  AST  47<H>  /  ALT  76<H>  /  AlkPhos  78  10-17    PT/INR - ( 16 Oct 2023 11:50 )   PT: 10.6 sec;   INR: 0.93          PTT - ( 17 Oct 2023 12:10 )  PTT:53.9 sec  Urinalysis Basic - ( 17 Oct 2023 05:30 )    Color: x / Appearance: x / SG: x / pH: x  Gluc: 104 mg/dL / Ketone: x  / Bili: x / Urobili: x   Blood: x / Protein: x / Nitrite: x   Leuk Esterase: x / RBC: x / WBC x   Sq Epi: x / Non Sq Epi: x / Bacteria: x        MICROBIOLOGY:    RADIOLOGY & ADDITIONAL STUDIES:

## 2023-10-17 NOTE — PROGRESS NOTE ADULT - PROBLEM SELECTOR PLAN 3
F: 975 ns over 13 hrs  E: replenish prn  N: npo for ctca  Gi: none given  Dvt: enoxaparin  Dispo: 5lach

## 2023-10-17 NOTE — PATIENT PROFILE ADULT - HAS THE PATIENT RECEIVED THE INFLUENZA VACCINE THIS SEASON?
Post-Care Instructions: I reviewed with the patient in detail post-care instructions. Patient is to keep the biopsy site dry overnight, and then apply bacitracin twice daily until healed. Patient may apply hydrogen peroxide soaks to remove any crusting. Epidermal Sutures: 4-0 Ethilon Bill 71102 For Specimen Handling/Conveyance To Laboratory?: no Home Suture Removal Text: Patient was provided a home suture removal kit and will remove their sutures at home.  If they have any questions or difficulties they will call the office. X Size Of Lesion In Cm (Optional): 0 Hemostasis: None Consent: Written consent was obtained and risks were reviewed including but not limited to scarring, infection, bleeding, scabbing, incomplete removal, nerve damage and allergy to anesthesia. no... Billing Type: Third-Party Bill Punch Size In Mm: 3 Validate Note Data (See Information Below): Yes Biopsy Type: H and E Suture Removal: 14 days Anesthesia Type: 1% lidocaine with epinephrine Dressing: bandage Detail Level: Detailed Information: Selecting Yes will display possible errors in your note based on the variables you have selected. This validation is only offered as a suggestion for you. PLEASE NOTE THAT THE VALIDATION TEXT WILL BE REMOVED WHEN YOU FINALIZE YOUR NOTE. IF YOU WANT TO FAX A PRELIMINARY NOTE YOU WILL NEED TO TOGGLE THIS TO 'NO' IF YOU DO NOT WANT IT IN YOUR FAXED NOTE. Notification Instructions: Patient will be notified of biopsy results. However, patient instructed to call the office if not contacted within 2 weeks. Anesthesia Volume In Cc (Will Not Render If 0): 0.5 Wound Care: Petrolatum

## 2023-10-17 NOTE — PATIENT PROFILE ADULT - FALL HARM RISK - HARM RISK INTERVENTIONS
Assistance with ambulation/Assistance OOB with selected safe patient handling equipment/Communicate Risk of Fall with Harm to all staff/Discuss with provider need for PT consult/Monitor gait and stability/Provide patient with walking aids - walker, cane, crutches/Reinforce activity limits and safety measures with patient and family/Sit up slowly, dangle for a short time, stand at bedside before walking/Tailored Fall Risk Interventions/Visual Cue: Yellow wristband and red socks/Bed in lowest position, wheels locked, appropriate side rails in place/Call bell, personal items and telephone in reach/Instruct patient to call for assistance before getting out of bed or chair/Non-slip footwear when patient is out of bed/[place] to call system/Physically safe environment - no spills, clutter or unnecessary equipment/Purposeful Proactive Rounding/Room/bathroom lighting operational, light cord in reach

## 2023-10-17 NOTE — PROGRESS NOTE ADULT - ASSESSMENT
82 y/o F with PMH  Meniere's disease (getting worse as per patient), vertigo and  poor balance, s/p  traumatic falls earlier this year,  hx anxiety disorder and no prior cardiac hx, who was BIBA to [facility] ED 10/16/23 with new onset of chest discomfort since this morning.

## 2023-10-17 NOTE — PROGRESS NOTE ADULT - PROBLEM SELECTOR PLAN 1
R/I for NSTEMI with trending up Troponin T --> 259 and now downtrended normal CRP  TTE done at bedside - nl EF and no wall motion abnormalities, no effusion     - heparin drip  start high dose statin ( 40mg given advance age), continue asa  no nitrates or BB at present for possibility of RCA involvement   remains HD stable  trend troponins,  need for cath TBD in am - made NPO after MN and IVF ordered at 75 cc/h x 12 hrs as pt got CTA dye load R/I for NSTEMI with trending up Troponin T --> 259 and now downtrended normal CRP  TTE done at bedside - nl EF and no wall motion abnormalities, no effusion     - c/w atorvastatin 40mg  - avoid nitrates or BB for possibility of RCA involvement   - f/u ctca

## 2023-10-18 LAB
ANION GAP SERPL CALC-SCNC: 10 MMOL/L — SIGNIFICANT CHANGE UP (ref 5–17)
ANION GAP SERPL CALC-SCNC: 10 MMOL/L — SIGNIFICANT CHANGE UP (ref 5–17)
APPEARANCE UR: CLEAR — SIGNIFICANT CHANGE UP
APPEARANCE UR: CLEAR — SIGNIFICANT CHANGE UP
BILIRUB UR-MCNC: NEGATIVE — SIGNIFICANT CHANGE UP
BILIRUB UR-MCNC: NEGATIVE — SIGNIFICANT CHANGE UP
BUN SERPL-MCNC: 16 MG/DL — SIGNIFICANT CHANGE UP (ref 7–23)
BUN SERPL-MCNC: 16 MG/DL — SIGNIFICANT CHANGE UP (ref 7–23)
CALCIUM SERPL-MCNC: 9 MG/DL — SIGNIFICANT CHANGE UP (ref 8.4–10.5)
CALCIUM SERPL-MCNC: 9 MG/DL — SIGNIFICANT CHANGE UP (ref 8.4–10.5)
CHLORIDE SERPL-SCNC: 105 MMOL/L — SIGNIFICANT CHANGE UP (ref 96–108)
CHLORIDE SERPL-SCNC: 105 MMOL/L — SIGNIFICANT CHANGE UP (ref 96–108)
CO2 SERPL-SCNC: 24 MMOL/L — SIGNIFICANT CHANGE UP (ref 22–31)
CO2 SERPL-SCNC: 24 MMOL/L — SIGNIFICANT CHANGE UP (ref 22–31)
COLOR SPEC: YELLOW — SIGNIFICANT CHANGE UP
COLOR SPEC: YELLOW — SIGNIFICANT CHANGE UP
CREAT SERPL-MCNC: 0.53 MG/DL — SIGNIFICANT CHANGE UP (ref 0.5–1.3)
CREAT SERPL-MCNC: 0.53 MG/DL — SIGNIFICANT CHANGE UP (ref 0.5–1.3)
DIFF PNL FLD: NEGATIVE — SIGNIFICANT CHANGE UP
DIFF PNL FLD: NEGATIVE — SIGNIFICANT CHANGE UP
EGFR: 91 ML/MIN/1.73M2 — SIGNIFICANT CHANGE UP
EGFR: 91 ML/MIN/1.73M2 — SIGNIFICANT CHANGE UP
GLUCOSE SERPL-MCNC: 95 MG/DL — SIGNIFICANT CHANGE UP (ref 70–99)
GLUCOSE SERPL-MCNC: 95 MG/DL — SIGNIFICANT CHANGE UP (ref 70–99)
GLUCOSE UR QL: NEGATIVE — SIGNIFICANT CHANGE UP
GLUCOSE UR QL: NEGATIVE — SIGNIFICANT CHANGE UP
HCT VFR BLD CALC: 39.9 % — SIGNIFICANT CHANGE UP (ref 34.5–45)
HCT VFR BLD CALC: 39.9 % — SIGNIFICANT CHANGE UP (ref 34.5–45)
HGB BLD-MCNC: 13.3 G/DL — SIGNIFICANT CHANGE UP (ref 11.5–15.5)
HGB BLD-MCNC: 13.3 G/DL — SIGNIFICANT CHANGE UP (ref 11.5–15.5)
KETONES UR-MCNC: NEGATIVE — SIGNIFICANT CHANGE UP
KETONES UR-MCNC: NEGATIVE — SIGNIFICANT CHANGE UP
LEUKOCYTE ESTERASE UR-ACNC: NEGATIVE — SIGNIFICANT CHANGE UP
LEUKOCYTE ESTERASE UR-ACNC: NEGATIVE — SIGNIFICANT CHANGE UP
MAGNESIUM SERPL-MCNC: 1.7 MG/DL — SIGNIFICANT CHANGE UP (ref 1.6–2.6)
MAGNESIUM SERPL-MCNC: 1.7 MG/DL — SIGNIFICANT CHANGE UP (ref 1.6–2.6)
MCHC RBC-ENTMCNC: 31.2 PG — SIGNIFICANT CHANGE UP (ref 27–34)
MCHC RBC-ENTMCNC: 31.2 PG — SIGNIFICANT CHANGE UP (ref 27–34)
MCHC RBC-ENTMCNC: 33.3 GM/DL — SIGNIFICANT CHANGE UP (ref 32–36)
MCHC RBC-ENTMCNC: 33.3 GM/DL — SIGNIFICANT CHANGE UP (ref 32–36)
MCV RBC AUTO: 93.7 FL — SIGNIFICANT CHANGE UP (ref 80–100)
MCV RBC AUTO: 93.7 FL — SIGNIFICANT CHANGE UP (ref 80–100)
NITRITE UR-MCNC: NEGATIVE — SIGNIFICANT CHANGE UP
NITRITE UR-MCNC: NEGATIVE — SIGNIFICANT CHANGE UP
NRBC # BLD: 0 /100 WBCS — SIGNIFICANT CHANGE UP (ref 0–0)
NRBC # BLD: 0 /100 WBCS — SIGNIFICANT CHANGE UP (ref 0–0)
PH UR: 8 — SIGNIFICANT CHANGE UP (ref 5–8)
PH UR: 8 — SIGNIFICANT CHANGE UP (ref 5–8)
PHOSPHATE SERPL-MCNC: 4.4 MG/DL — SIGNIFICANT CHANGE UP (ref 2.5–4.5)
PHOSPHATE SERPL-MCNC: 4.4 MG/DL — SIGNIFICANT CHANGE UP (ref 2.5–4.5)
PLATELET # BLD AUTO: 195 K/UL — SIGNIFICANT CHANGE UP (ref 150–400)
PLATELET # BLD AUTO: 195 K/UL — SIGNIFICANT CHANGE UP (ref 150–400)
POTASSIUM SERPL-MCNC: 3.6 MMOL/L — SIGNIFICANT CHANGE UP (ref 3.5–5.3)
POTASSIUM SERPL-MCNC: 3.6 MMOL/L — SIGNIFICANT CHANGE UP (ref 3.5–5.3)
POTASSIUM SERPL-SCNC: 3.6 MMOL/L — SIGNIFICANT CHANGE UP (ref 3.5–5.3)
POTASSIUM SERPL-SCNC: 3.6 MMOL/L — SIGNIFICANT CHANGE UP (ref 3.5–5.3)
PROT UR-MCNC: NEGATIVE MG/DL — SIGNIFICANT CHANGE UP
PROT UR-MCNC: NEGATIVE MG/DL — SIGNIFICANT CHANGE UP
RBC # BLD: 4.26 M/UL — SIGNIFICANT CHANGE UP (ref 3.8–5.2)
RBC # BLD: 4.26 M/UL — SIGNIFICANT CHANGE UP (ref 3.8–5.2)
RBC # FLD: 13 % — SIGNIFICANT CHANGE UP (ref 10.3–14.5)
RBC # FLD: 13 % — SIGNIFICANT CHANGE UP (ref 10.3–14.5)
SODIUM SERPL-SCNC: 139 MMOL/L — SIGNIFICANT CHANGE UP (ref 135–145)
SODIUM SERPL-SCNC: 139 MMOL/L — SIGNIFICANT CHANGE UP (ref 135–145)
SP GR SPEC: 1.01 — SIGNIFICANT CHANGE UP (ref 1–1.03)
SP GR SPEC: 1.01 — SIGNIFICANT CHANGE UP (ref 1–1.03)
UROBILINOGEN FLD QL: 1 E.U./DL — SIGNIFICANT CHANGE UP
UROBILINOGEN FLD QL: 1 E.U./DL — SIGNIFICANT CHANGE UP
WBC # BLD: 8 K/UL — SIGNIFICANT CHANGE UP (ref 3.8–10.5)
WBC # BLD: 8 K/UL — SIGNIFICANT CHANGE UP (ref 3.8–10.5)
WBC # FLD AUTO: 8 K/UL — SIGNIFICANT CHANGE UP (ref 3.8–10.5)
WBC # FLD AUTO: 8 K/UL — SIGNIFICANT CHANGE UP (ref 3.8–10.5)

## 2023-10-18 PROCEDURE — 99233 SBSQ HOSP IP/OBS HIGH 50: CPT

## 2023-10-18 RX ORDER — METOPROLOL TARTRATE 50 MG
25 TABLET ORAL DAILY
Refills: 0 | Status: DISCONTINUED | OUTPATIENT
Start: 2023-10-18 | End: 2023-10-23

## 2023-10-18 RX ORDER — SODIUM CHLORIDE 9 MG/ML
250 INJECTION INTRAMUSCULAR; INTRAVENOUS; SUBCUTANEOUS ONCE
Refills: 0 | Status: COMPLETED | OUTPATIENT
Start: 2023-10-18 | End: 2023-10-18

## 2023-10-18 RX ORDER — ASPIRIN/CALCIUM CARB/MAGNESIUM 324 MG
81 TABLET ORAL EVERY 24 HOURS
Refills: 0 | Status: DISCONTINUED | OUTPATIENT
Start: 2023-10-18 | End: 2023-10-20

## 2023-10-18 RX ORDER — OLANZAPINE 15 MG/1
2.5 TABLET, FILM COATED ORAL ONCE
Refills: 0 | Status: COMPLETED | OUTPATIENT
Start: 2023-10-18 | End: 2023-10-18

## 2023-10-18 RX ADMIN — ATORVASTATIN CALCIUM 40 MILLIGRAM(S): 80 TABLET, FILM COATED ORAL at 21:46

## 2023-10-18 RX ADMIN — SODIUM CHLORIDE 250 MILLILITER(S): 9 INJECTION INTRAMUSCULAR; INTRAVENOUS; SUBCUTANEOUS at 09:20

## 2023-10-18 RX ADMIN — BUPROPION HYDROCHLORIDE 300 MILLIGRAM(S): 150 TABLET, EXTENDED RELEASE ORAL at 17:37

## 2023-10-18 RX ADMIN — Medication 25 MILLIGRAM(S): at 12:48

## 2023-10-18 RX ADMIN — Medication 81 MILLIGRAM(S): at 12:48

## 2023-10-18 RX ADMIN — SERTRALINE 25 MILLIGRAM(S): 25 TABLET, FILM COATED ORAL at 17:38

## 2023-10-18 RX ADMIN — Medication 0.5 MILLIGRAM(S): at 01:54

## 2023-10-18 RX ADMIN — Medication 650 MILLIGRAM(S): at 18:12

## 2023-10-18 RX ADMIN — OLANZAPINE 2.5 MILLIGRAM(S): 15 TABLET, FILM COATED ORAL at 03:32

## 2023-10-18 RX ADMIN — ENOXAPARIN SODIUM 40 MILLIGRAM(S): 100 INJECTION SUBCUTANEOUS at 15:58

## 2023-10-18 NOTE — PHYSICAL THERAPY INITIAL EVALUATION ADULT - PERTINENT HX OF CURRENT PROBLEM, REHAB EVAL
83F w/ PMHx of Meniere's disease, vertigo w/ poor balance, h/o traumatic falls and Anxiety disorder, was BIBEMS to [facility] ED 10/16 c/o new onset CP that morning. Pt reports crushing CP, 10/10 severity, radiating to upper back and shoulders w/ movement and that is reproducible. Pt R/I NSTEMI. Pt loaded w/ ASA 325mg and admitted to cardiac tele for management of NSTEMI. Trop downtrended to 54, EKG remained unchanged. Pt now referred for cardiac catheterization w/ possible

## 2023-10-18 NOTE — DIETITIAN INITIAL EVALUATION ADULT - NUTRITIONGOAL OUTCOME1
Pt will meet at least 75% of protein & energy needs via most appropriate route for nutrition   Pt will reduce/improve s/s malnutrition

## 2023-10-18 NOTE — PROGRESS NOTE ADULT - PROBLEM SELECTOR PLAN 1
R/I for NSTEMI with trending up Troponin T --> 259 and now downtrended normal CRP  TTE done at bedside - nl EF and no wall motion abnormalities, no effusion     - c/w atorvastatin 40mg  - avoid nitrates or BB for possibility of RCA involvement   - f/u ctca R/I for NSTEMI with trending up Troponin T --> 259 and now downtrended normal CRP, UA wnl, Lipid profile wnl.   TTE done at bedside - nl EF and no wall motion abnormalities, no effusion, no ventricular dysfx     CT cardiac angio:   1.  The calcium score is severe at 1104 Agatston units.  2.  Less than 50% stenosis of left main coronary artery.  3.  Calcific plaque obscures the lumen of proximal LAD, and mid RCA   precluding assessment of stenosis severity.  4.  Remaining segments demonstrate non-obstructive coronary artery   disease.  5. No PE, no effusions     - c/w atorvastatin 40mg po qd, toprol 25mg po qd, aspirin 81mg po qd  - avoid nitrates for possibility of RCA involvement   - CT cardiac angio rec FFr. -> pending FFR ctca vs cath, possibly 10/19

## 2023-10-18 NOTE — PHYSICAL THERAPY INITIAL EVALUATION ADULT - BED MOBILITY LIMITATIONS, REHAB EVAL
dangle at edge of bed with max A x 1, pt extremely lethargic and repeatedly stating "my body is too sore from exercise yesterday," pt frequently attempting to return to supine/decreased ability to use arms for pushing/pulling/decreased ability to use legs for bridging/pushing/impaired ability to control trunk for mobility

## 2023-10-18 NOTE — DIETITIAN INITIAL EVALUATION ADULT - REASON INDICATOR FOR ASSESSMENT
MST Score >=2
Clear R, L eye injected conjunctiva, PERRL, EOMI, visual acuity - finger/light R eye, 20/20 L, 20/20 ou, pH L 7.0, L eye: cornea w/o fluorescein uptake, lid and lashes nl

## 2023-10-18 NOTE — PROGRESS NOTE ADULT - SUBJECTIVE AND OBJECTIVE BOX
Interventional Cardiology PA Precath Note    HPI: 83F w/ PMHx of Meniere's disease, vertigo w/ poor balance, h/o traumatic falls and Anxiety disorder, was BIBEMS to [facility] ED 10/16 c/o new onset CP that morning. Pt reports crushing CP, 10/10 severity, radiating to upper back and shoulders w/ movement and that is reproducible. Pt R/I NSTEMI w/ hsTropT 206 > 256, , CKMB 8.4, and EKG: NSR, non ischemic. Pt loaded w/ ASA 325mg and admitted to cardiac tele for management of NSTEMI. Trop downtrended to 54, EKG remained unchanged, TTE revealed normal LVEF w/o WMA and CCTA revealed Ca score 1104, LM <50%, calcific plaque obscures lumen of pLAD and mRCA, remaining non obstructive. Pt now referred for cardiac catheterization w/ possible intervention, in light of pts risk factors, CCS class II anginal symptoms and abnormal CCTA.    ALL:   codeine (Unknown)  penicillins (Unknown)    MEDS:   acetaminophen     Tablet .. 650 milliGRAM(s) Oral every 6 hours PRN  atorvastatin 40 milliGRAM(s) Oral at bedtime  buPROPion XL (24-Hour) . 300 milliGRAM(s) Oral daily  enoxaparin Injectable 40 milliGRAM(s) SubCutaneous every 24 hours  influenza  Vaccine (HIGH DOSE) 0.7 milliLiter(s) IntraMuscular once  LORazepam     Tablet 0.5 milliGRAM(s) Oral daily PRN  sertraline 25 milliGRAM(s) Oral daily  topiramate 25 milliGRAM(s) Oral daily    VITAL SIGNS:  T(C): 37.2 (10-17-23 @ 21:43), Max: 37.3 (10-17-23 @ 17:17)  HR: 68 (10-18-23 @ 03:10) (60 - 72)  BP: 167/76 (10-18-23 @ 03:10) (117/56 - 167/76)  RR: 20 (10-18-23 @ 03:10) (18 - 30)  SpO2: 96% (10-18-23 @ 03:10) (94% - 99%)    PHYSICAL EXAM:  HEENT: NCAT, EOMI, PERRLA  NECK: No JVD, No carotid bruits B/L, +2 Carotid pulses B/L  PULM:  CTA B/L No W/R/R  CARD: RRR, +S1, +S2, No M/R/G  ABD: ND, +BS, NT, no masses  EXT: Warm, pedal edema yes/no, pitting/non-pitting  NEURO: A & O x 3, no focal neurologic deficits  PULSES:	   B	       R	                FEM         	                                 DP/PT    LABS:                     13.3   8.00  )-----------( 195      ( 18 Oct 2023 05:30 )             39.9     10-18    139  |  105  |  16  ----------------------------<  95  3.6   |  24  |  0.53    Ca    9.0      18 Oct 2023 05:30  Phos  4.4     10-18  Mg     1.7     10-18    TPro  5.6<L>  /  Alb  3.6  /  TBili  0.4  /  DBili  0.2  /  AST  47<H>  /  ALT  76<H>  /  AlkPhos  78  10-17    CARDIAC MARKERS ( 17 Oct 2023 05:30 )  x     / x     / 69 U/L / x     / 2.4 ng/mL  CARDIAC MARKERS ( 16 Oct 2023 20:05 )  x     / x     / 94 U/L / x     / 4.0 ng/mL  CARDIAC MARKERS ( 16 Oct 2023 11:50 )  x     / x     / 242 U/L / x     / 8.4 ng/mL      EKG: NSR, no ischemic changes.    TTE: normal LVEF w/o WMA, no significant valvular disease.    CCTA: Ca score 1104, LM <50%, calcific plaque obscures lumen of pLAD and mRCA, remaining non obstructive.      A/P:  83F w/ PMHx of Meniere's disease, vertigo w/ poor balance, h/o traumatic falls and Anxiety disorder, admitted to cardiac tele for management of NSTEMI. Pt found to have abnormal CCTA and now referred for cardiac catheterization w/ possible intervention.    - ASA 3, Mallampati 3  - NPO for cardiac cath today  - s/p  on 8/16 and 8/17, c/w ASA 81mg prior to cath  - Load w/ PLavix 600mg prior to cath  - Precath IVF Hydration - NS 250cc bolus   - Precath/Consented    - Sedation Plan: Moderate     - Patient Is Suitable Candidate For Sedation?    Yes      - Cardiac Cath Order Placed: Yes    Risks & benefits of procedure and sedation and risks and benefits for the alternative therapy have been explained to the patient in layman’s terms including but not limited to: allergic reaction, bleeding, infection, arrhythmia, respiratory compromise, renal and vascular compromise, limb damage, MI, CVA, emergent CABG/Vascular Surgery and death. Informed consent obtained and in chart. Interventional Cardiology PA Precath Note    HPI: 83F w/ PMHx of Meniere's disease, vertigo w/ poor balance, h/o traumatic falls and Anxiety disorder, was BIBEMS to [facility] ED 10/16 c/o new onset CP that morning. Pt reports crushing CP, 10/10 severity, radiating to upper back and shoulders w/ movement and that is reproducible. Pt R/I NSTEMI w/ hsTropT 206 > 256, , CKMB 8.4, and EKG: NSR, non ischemic. Pt loaded w/ ASA 325mg and admitted to cardiac tele for management of NSTEMI. Trop downtrended to 54, EKG remained unchanged, TTE revealed normal LVEF w/o WMA and CCTA revealed Ca score 1104, LM <50%, calcific plaque obscures lumen of pLAD and mRCA, remaining non obstructive. Pt now referred for cardiac catheterization w/ possible intervention, in light of pts risk factors, CCS class II anginal symptoms and abnormal CCTA.    ALL:   codeine (Unknown)  penicillins (Unknown)    MEDS:   acetaminophen     Tablet .. 650 milliGRAM(s) Oral every 6 hours PRN  atorvastatin 40 milliGRAM(s) Oral at bedtime  buPROPion XL (24-Hour) . 300 milliGRAM(s) Oral daily  enoxaparin Injectable 40 milliGRAM(s) SubCutaneous every 24 hours  influenza  Vaccine (HIGH DOSE) 0.7 milliLiter(s) IntraMuscular once  LORazepam     Tablet 0.5 milliGRAM(s) Oral daily PRN  sertraline 25 milliGRAM(s) Oral daily  topiramate 25 milliGRAM(s) Oral daily    VITAL SIGNS:  T(C): 37.2 (10-17-23 @ 21:43), Max: 37.3 (10-17-23 @ 17:17)  HR: 68 (10-18-23 @ 03:10) (60 - 72)  BP: 167/76 (10-18-23 @ 03:10) (117/56 - 167/76)  RR: 20 (10-18-23 @ 03:10) (18 - 30)  SpO2: 96% (10-18-23 @ 03:10) (94% - 99%)    PHYSICAL EXAM:  HEENT: NCAT, EOMI, PERRLA  NECK: No JVD, No carotid bruits B/L, +2 Carotid pulses B/L  PULM:  CTA B/L No W/R/R  CARD: RRR, +S1, +S2, No M/R/G  ABD: ND, +BS, NT, no masses  EXT: Warm, pedal edema yes/no, pitting/non-pitting  NEURO: A & O x 3, no focal neurologic deficits  PULSES:	   B	       R	                FEM         	                                 DP/PT    LABS:                     13.3   8.00  )-----------( 195      ( 18 Oct 2023 05:30 )             39.9     10-18    139  |  105  |  16  ----------------------------<  95  3.6   |  24  |  0.53    Ca    9.0      18 Oct 2023 05:30  Phos  4.4     10-18  Mg     1.7     10-18    TPro  5.6<L>  /  Alb  3.6  /  TBili  0.4  /  DBili  0.2  /  AST  47<H>  /  ALT  76<H>  /  AlkPhos  78  10-17    CARDIAC MARKERS ( 17 Oct 2023 05:30 )  x     / x     / 69 U/L / x     / 2.4 ng/mL  CARDIAC MARKERS ( 16 Oct 2023 20:05 )  x     / x     / 94 U/L / x     / 4.0 ng/mL  CARDIAC MARKERS ( 16 Oct 2023 11:50 )  x     / x     / 242 U/L / x     / 8.4 ng/mL      EKG: NSR, no ischemic changes.    TTE: normal LVEF w/o WMA, no significant valvular disease.    CCTA: Ca score 1104, LM <50%, calcific plaque obscures lumen of pLAD and mRCA, remaining non obstructive.      A/P:  83F w/ PMHx of Meniere's disease, vertigo w/ poor balance, h/o traumatic falls and Anxiety disorder, admitted to cardiac tele for management of NSTEMI. Pt found to have abnormal CCTA and now referred for cardiac catheterization w/ possible intervention.    - ASA 3, Mallampati 3  - NPO for cardiac cath today  - s/p  on 8/16 and 8/17, c/w ASA 81mg prior to cath  - Load w/ Plavix 600mg prior to cath  - K 3.6 - supplement w/ Potassium 40mEq PO x1  - Precath IVF Hydration - NS 250cc bolus   - Precath/Consented    - Sedation Plan: Moderate     - Patient Is Suitable Candidate For Sedation?    Yes      - Cardiac Cath Order Placed: Yes    Risks & benefits of procedure and sedation and risks and benefits for the alternative therapy have been explained to the patient in layman’s terms including but not limited to: allergic reaction, bleeding, infection, arrhythmia, respiratory compromise, renal and vascular compromise, limb damage, MI, CVA, emergent CABG/Vascular Surgery and death. Informed consent obtained and in chart. Interventional Cardiology PA Precath Note    HPI: 83F w/ PMHx of Meniere's disease, vertigo w/ poor balance, h/o traumatic falls and Anxiety disorder, was BIBEMS to [facility] ED 10/16 c/o new onset CP that morning. Pt reports crushing CP, 10/10 severity, radiating to upper back and shoulders w/ movement and that is reproducible. Pt R/I NSTEMI w/ hsTropT 206 > 256, , CKMB 8.4, and EKG: NSR, non ischemic. Pt loaded w/ ASA 325mg and admitted to cardiac tele for management of NSTEMI. Trop downtrended to 54, EKG remained unchanged, TTE revealed normal LVEF w/o WMA and CCTA revealed Ca score 1104, LM <50%, calcific plaque obscures lumen of pLAD and mRCA, remaining non obstructive. Pt now referred for cardiac catheterization w/ possible intervention, in light of pts risk factors, CCS class II anginal symptoms and abnormal CCTA.    ALL:   codeine (Unknown)  penicillins (Unknown)    MEDS:   acetaminophen     Tablet .. 650 milliGRAM(s) Oral every 6 hours PRN  atorvastatin 40 milliGRAM(s) Oral at bedtime  buPROPion XL (24-Hour) . 300 milliGRAM(s) Oral daily  enoxaparin Injectable 40 milliGRAM(s) SubCutaneous every 24 hours  influenza  Vaccine (HIGH DOSE) 0.7 milliLiter(s) IntraMuscular once  LORazepam     Tablet 0.5 milliGRAM(s) Oral daily PRN  sertraline 25 milliGRAM(s) Oral daily  topiramate 25 milliGRAM(s) Oral daily    VITAL SIGNS:  T(C): 37.2 (10-17-23 @ 21:43), Max: 37.3 (10-17-23 @ 17:17)  HR: 68 (10-18-23 @ 03:10) (60 - 72)  BP: 167/76 (10-18-23 @ 03:10) (117/56 - 167/76)  RR: 20 (10-18-23 @ 03:10) (18 - 30)  SpO2: 96% (10-18-23 @ 03:10) (94% - 99%)    PHYSICAL EXAM:  HEENT: NCAT, EOMI, PERRLA  NECK: No JVD, No carotid bruits B/L, +2 Carotid pulses B/L  PULM:  CTA B/L No W/R/R  CARD: RRR, +S1, +S2, No M/R/G  ABD: ND, +BS, NT, no masses  EXT: Warm, pedal edema yes/no, pitting/non-pitting  NEURO: A & O x 3, no focal neurologic deficits  PULSES:	   B	       R	                FEM         	                                 DP/PT    LABS:                     13.3   8.00  )-----------( 195      ( 18 Oct 2023 05:30 )             39.9     10-18    139  |  105  |  16  ----------------------------<  95  3.6   |  24  |  0.53    Ca    9.0      18 Oct 2023 05:30  Phos  4.4     10-18  Mg     1.7     10-18    TPro  5.6<L>  /  Alb  3.6  /  TBili  0.4  /  DBili  0.2  /  AST  47<H>  /  ALT  76<H>  /  AlkPhos  78  10-17    CARDIAC MARKERS ( 17 Oct 2023 05:30 )  x     / x     / 69 U/L / x     / 2.4 ng/mL  CARDIAC MARKERS ( 16 Oct 2023 20:05 )  x     / x     / 94 U/L / x     / 4.0 ng/mL  CARDIAC MARKERS ( 16 Oct 2023 11:50 )  x     / x     / 242 U/L / x     / 8.4 ng/mL      EKG: NSR, no ischemic changes.    TTE: normal LVEF w/o WMA, no significant valvular disease.    CCTA: Ca score 1104, LM <50%, calcific plaque obscures lumen of pLAD and mRCA, remaining non obstructive.      A/P:  83F w/ PMHx of Meniere's disease, vertigo w/ poor balance, h/o traumatic falls and Anxiety disorder, admitted to cardiac tele for management of NSTEMI. Pt found to have abnormal CCTA and now referred for cardiac catheterization w/ possible intervention.    - ASA 3, Mallampati 3  - NPO for cardiac cath today  - s/p  on 8/16 and 8/17, c/w ASA 81mg prior to cath  - Load w/ Plavix 600mg prior to cath  - K 3.6 and Mag 1.7 - supplement w/ Potassium 40mEq PO x1 and Mag 2g IVPB  - Precath IVF Hydration - NS 250cc bolus   - Precath/Consented    - Sedation Plan: Moderate     - Patient Is Suitable Candidate For Sedation?    Yes      - Cardiac Cath Order Placed: Yes    Risks & benefits of procedure and sedation and risks and benefits for the alternative therapy have been explained to the patient in layman’s terms including but not limited to: allergic reaction, bleeding, infection, arrhythmia, respiratory compromise, renal and vascular compromise, limb damage, MI, CVA, emergent CABG/Vascular Surgery and death. Informed consent obtained and in chart.

## 2023-10-18 NOTE — DIETITIAN INITIAL EVALUATION ADULT - OTHER INFO
82 y/o F with PMH  Meniere's disease (getting worse as per patient), vertigo and  poor balance, s/p  traumatic falls earlier this year,  hx anxiety disorder and no prior cardiac hx, who was BIBA to [facility] ED 10/16/23 with new onset of chest discomfort since this morning.     Chart reviewed. Pt seen at bedside on 5LA, on room air. Assessment limited due to pt minimally participative, requesting to defer as pt wishes to sleep. Will f/u. Currently ordered for DASH/TLC diet. NKFA per EMR. Unable to obtain food preferences. NFPE notable for moderate muscle wasting & subcutaneous losses, unable to obtain weight history. At present, pt meets criteria for moderate malnutrition per ASPEN guidelines. No height in EMR, estimate of 65in. used for calculations based on visual assessment. Labs reviewed- ALT & AST elevated, K+ 3.4 <L>, LDL high, other lipids WNL. Meds reviewed. RDN will continue to monitor, reassess, and intervene as appropriate.     Pain: no pain/discomfort  Skin: no pressure injuries noted, Shahab score 17  GI: soft, nontender/nondistended

## 2023-10-18 NOTE — DIETITIAN INITIAL EVALUATION ADULT - PERTINENT LABORATORY DATA
10-18    139  |  105  |  16  ----------------------------<  95  3.6   |  24  |  0.53    Ca    9.0      18 Oct 2023 05:30  Phos  4.4     10-18  Mg     1.7     10-18    TPro  5.6<L>  /  Alb  3.6  /  TBili  0.4  /  DBili  0.2  /  AST  47<H>  /  ALT  76<H>  /  AlkPhos  78  10-17  A1C with Estimated Average Glucose Result: 4.7 % (10-17-23 @ 05:30)

## 2023-10-18 NOTE — DIETITIAN INITIAL EVALUATION ADULT - ADD RECOMMEND
1. c/w current diet order  - monitor intake & tolerance  2. Recommend Ensure Plus HP BID; provides 350 kcals, 20g protein each  3. Consider MVI, thiamine  4. Monitor chemistry, GI function, skin integrity  5. Pain & bowel regimen per team  6. To follow clinical course and adjust recommendations prn

## 2023-10-18 NOTE — DIETITIAN INITIAL EVALUATION ADULT - OTHER CALCULATIONS
Current body wt [46.7kg] used for energy calculations as pt falls within % IBW. Needs estimated for age and adjusted for current clinical status, increased needs for clinical status & repletion. Fluids per team

## 2023-10-18 NOTE — DIETITIAN INITIAL EVALUATION ADULT - PERTINENT MEDS FT
MEDICATIONS  (STANDING):  aspirin  chewable 81 milliGRAM(s) Oral every 24 hours  atorvastatin 40 milliGRAM(s) Oral at bedtime  buPROPion XL (24-Hour) . 300 milliGRAM(s) Oral daily  enoxaparin Injectable 40 milliGRAM(s) SubCutaneous every 24 hours  influenza  Vaccine (HIGH DOSE) 0.7 milliLiter(s) IntraMuscular once  metoprolol succinate ER 25 milliGRAM(s) Oral daily  sertraline 25 milliGRAM(s) Oral daily  topiramate 25 milliGRAM(s) Oral daily    MEDICATIONS  (PRN):  acetaminophen     Tablet .. 650 milliGRAM(s) Oral every 6 hours PRN Temp greater or equal to 38C (100.4F), Mild Pain (1 - 3)  LORazepam     Tablet 0.5 milliGRAM(s) Oral daily PRN Anxiety

## 2023-10-18 NOTE — PROGRESS NOTE ADULT - ASSESSMENT
82 y/o F with PMH  Meniere's disease (getting worse as per patient), vertigo and  poor balance, s/p  traumatic falls earlier this year,  hx anxiety disorder and no prior cardiac hx, who was BIBA to [facility] ED 10/16/23 with new onset of chest discomfort since this morning.  84 y/o F with PMH  Meniere's disease (getting worse as per patient), vertigo and  poor balance, s/p  traumatic falls earlier this year,  hx anxiety disorder and no prior cardiac hx, who was BIBA to [facility] ED 10/16/23 with new onset of atypical chest pain, reflecting potential unstable angina vs NSTEMI from typical angina, high Agatston score on Cardiac CT angio, pending FFR cardiac angio given risk vs benefit patient profile of immediate cardiac cath

## 2023-10-18 NOTE — PHYSICAL THERAPY INITIAL EVALUATION ADULT - IMPAIRMENTS FOUND, PT EVAL
aerobic capacity/endurance/arousal, attention, and cognition/ergonomics and body mechanics/gait, locomotion, and balance/gross motor/muscle strength

## 2023-10-18 NOTE — DIETITIAN NUTRITION RISK NOTIFICATION - ADDITIONAL COMMENTS/DIETITIAN RECOMMENDATIONS
1. c/w current diet order  - monitor intake & tolerance  2. Recommend Ensure Plus HP BID; provides 350 kcals, 20g protein each  3. Consider MVI, thiamine  4. Monitor chemistry, GI function, skin integrity  5. Pain & bowel regimen per team  6. To follow clinical course and adjust recommendations prn    RDN to follow at high risk per nutrition protocol, reconsult prn.    Noelle Briceño MS, RDN, CDN [ext. [phone]]

## 2023-10-18 NOTE — PROGRESS NOTE ADULT - PROBLEM SELECTOR PLAN 2
Home med: wellbutrin 300mg qD, sertraline 25mg qD, topiramate 25mg qD    - c/w home med Home med in record: wellbutrin 300mg qD, sertraline 25mg qD, topiramate 25mg qD    - c/w home med  - confirm Zoloft use as patient denied it as home med

## 2023-10-18 NOTE — DIETITIAN INITIAL EVALUATION ADULT - HEIGHT FOR BMI (CENTIMETERS)
Mr. Marco Butt is a 66 y.o. y/o male with history of Afib who presents today for anticoagulation monitoring and adjustment. INR 1.2 is subtherapeutic for this patient (goal range 2-3) and is reflective of 37.5 mg TWD  Patient verifies current dosing regimen, patient able to verbally recall dose  Patient reports no  missed doses since last INR   Patient denies s/sx clotting and/or stroke  Patient denies hematuria, epistaxis, rectal bleeding  Patient denies changes in diet, alcohol, or tobacco use  Reviewed medication list and drug allergies with patient, updated any medication additions or modifications accordingly    No identifiable reason for low INR's over past 3 weeks. Patient has been started on gemzar and carboplatin however neither of those would decrease the INR or interact with primidone. Extensively discussed if there are differences from his dosing in Vxxl-Dtdf-Yidicm when he was therapeutic on 22.5 mg TWD, nothing identified.  Patients wife is very concerned about low INRs as he has had 5 embolic episodes in the past.     Patient also denies any pending medical or dental procedures scheduled at this time  Patient was instructed to take 10 mg today then increase TWD to 52.5 mg until next appt in 6 days    [address]: MED RECONCILIATION/REVIEW [address]: Yes  Total # of Interventions Recommended: 2  - Increased Dose #: 1  - Maintenance Safety Lab Monitoring #: 1  Total Interventions Accepted: 2  Time Spent (min): 30    Irish Moreno, PharmD, [facility]  Staff Pharmacist  12/17/2020 3:12 PM 165.1

## 2023-10-18 NOTE — PROGRESS NOTE ADULT - SUBJECTIVE AND OBJECTIVE BOX
CC: Patient is a 83y old  Female who presents with a chief complaint of NSTEMI (18 Oct 2023 07:29)      INTERVAL EVENTS: ANTWAN    SUBJECTIVE / INTERVAL HPI: Patient seen and examined at bedside.     ROS: negative unless otherwise stated above.    VITAL SIGNS:  Vitals Interpretation review:  Vital Signs Last 24 Hrs  T(C): 36.6 (18 Oct 2023 10:05), Max: 37.3 (17 Oct 2023 17:17)  T(F): 97.9 (18 Oct 2023 10:05), Max: 99.1 (17 Oct 2023 17:17)  HR: 72 (18 Oct 2023 10:52) (60 - 76)  BP: 179/72 (18 Oct 2023 10:52) (117/56 - 179/72)  BP(mean): 103 (18 Oct 2023 10:52) (80 - 109)  RR: 52 (18 Oct 2023 10:52) (18 - 52)  SpO2: 99% (18 Oct 2023 10:52) (96% - 99%)    Parameters below as of 18 Oct 2023 10:52  Patient On (Oxygen Delivery Method): room air          10-17-23 @ 07:01  -  10-18-23 @ 07:00  --------------------------------------------------------  IN: 330 mL / OUT: 850 mL / NET: -520 mL        PHYSICAL EXAM:    General: NAD  HEENT: MMM  Neck: supple  Cardiovascular: +S1/S2; RRR  Respiratory: CTA B/L; no W/R/R  Gastrointestinal: soft, NT/ND  Extremities: WWP; no edema, clubbing or cyanosis  Vascular: 2+ radial, DP/PT pulses B/L  Neurological: AAOx3; no focal deficits    MEDICATIONS:  MEDICATIONS  (STANDING):  aspirin  chewable 81 milliGRAM(s) Oral every 24 hours  atorvastatin 40 milliGRAM(s) Oral at bedtime  buPROPion XL (24-Hour) . 300 milliGRAM(s) Oral daily  enoxaparin Injectable 40 milliGRAM(s) SubCutaneous every 24 hours  influenza  Vaccine (HIGH DOSE) 0.7 milliLiter(s) IntraMuscular once  metoprolol succinate ER 25 milliGRAM(s) Oral daily  sertraline 25 milliGRAM(s) Oral daily  topiramate 25 milliGRAM(s) Oral daily    MEDICATIONS  (PRN):  acetaminophen     Tablet .. 650 milliGRAM(s) Oral every 6 hours PRN Temp greater or equal to 38C (100.4F), Mild Pain (1 - 3)  LORazepam     Tablet 0.5 milliGRAM(s) Oral daily PRN Anxiety      ALLERGIES:  Allergies    codeine (Unknown)  penicillins (Unknown)    Intolerances        LABS:  Labs Interpretation:                         13.3   8.00  )-----------( 195      ( 18 Oct 2023 05:30 )             39.9     10-18    139  |  105  |  16  ----------------------------<  95  3.6   |  24  |  0.53    Ca    9.0      18 Oct 2023 05:30  Phos  4.4     10-18  Mg     1.7     10-18    TPro  5.6<L>  /  Alb  3.6  /  TBili  0.4  /  DBili  0.2  /  AST  47<H>  /  ALT  76<H>  /  AlkPhos  78  10-17    PTT - ( 17 Oct 2023 12:10 )  PTT:53.9 sec  Urinalysis Basic - ( 18 Oct 2023 05:30 )    Color: x / Appearance: x / SG: x / pH: x  Gluc: 95 mg/dL / Ketone: x  / Bili: x / Urobili: x   Blood: x / Protein: x / Nitrite: x   Leuk Esterase: x / RBC: x / WBC x   Sq Epi: x / Non Sq Epi: x / Bacteria: x      CAPILLARY BLOOD GLUCOSE                RADIOLOGY & ADDITIONAL TESTS: Reviewed.  CXR  EKG    Imaging Interpretation Review:    FMHx  Surgical Hx   CC: Patient is a 83y old  Female who presents with a chief complaint of NSTEMI (18 Oct 2023 07:29)      INTERVAL EVENTS: Patient reported to be agitated and aggressive, administered Ativan and Zyprexa.     SUBJECTIVE / INTERVAL HPI: Patient seen and examined at bedside.   Early in the day patient was heavily sedated, easily arousable but unable to fully express herself clearly, with no signs of appropriate memory.   At 5pm, patient was noted to be significantly more alert and communicative. Patient was agitated as she was unsure of the exact details of events over past few days. Reports she last remembers being home, awakening from sleep d/t chest pressure, walking towards her kitchen at night when she started experiencing 8/10 squeezing chest pain, radiating to b/l arms and mid back, associated with nausea & diaphoresis, worsened on deep inspiration. This was the first time she remembers experiencing such severe chest pain, and denied associated vomiting, lightheadedness, syncope, paresthesias, cough, headaches, vision changes, abdominal pain. She reports that she contacted her friend who brought [facility] ED and has hard time remembering events beyond that.   Reports that her health care proxy is her close friend named Sarah Keith, who has documentation to prove it. Denied any of her immediate family members being the proxy.   Was able to express appropriate understanding of her current testing and treatment regimen, with anticipated testing and cardiac catheterization planning. Endorsed understanding the procedures, plan, and was able to express logical thought process demonstrating understanding risks and benefits, with multiple teach back sessions using closed loop communication.      ROS: negative unless otherwise stated above.    VITAL SIGNS:  Vitals Interpretation review:  Vital Signs Last 24 Hrs  T(C): 36.6 (18 Oct 2023 10:05), Max: 37.3 (17 Oct 2023 17:17)  T(F): 97.9 (18 Oct 2023 10:05), Max: 99.1 (17 Oct 2023 17:17)  HR: 72 (18 Oct 2023 10:52) (60 - 76)  BP: 179/72 (18 Oct 2023 10:52) (117/56 - 179/72)  BP(mean): 103 (18 Oct 2023 10:52) (80 - 109)  RR: 52 (18 Oct 2023 10:52) (18 - 52)  SpO2: 99% (18 Oct 2023 10:52) (96% - 99%)    Parameters below as of 18 Oct 2023 10:52  Patient On (Oxygen Delivery Method): room air      10-17-23 @ 07:01  -  10-18-23 @ 07:00  --------------------------------------------------------  IN: 330 mL / OUT: 850 mL / NET: -520 mL        PHYSICAL EXAM:  General: Mild distress due to pain. Frail and cachectic appearing.   HEENT: MMM  Neck: supple  Cardiovascular: +S1/S2; RRR. Pleuritic c/p.   Respiratory: CTA B/L; no W/R/R  Gastrointestinal: soft, NT/ND  Extremities: WWP; no edema, clubbing or cyanosis  Vascular: 2+ radial, DP/PT pulses B/L  Neurological: AAOx2, not to date as patient is 3 days away from correct date; no focal deficits  Psych: Waxing and waning attention span and orientation. Worsens with sedatives and CNS depressants. Able to be reoriented with teachback and once being made aware of time/place/situation. Linear thought process, content, with appropriate insight and judgement when alert.       MEDICATIONS:  MEDICATIONS  (STANDING):  aspirin  chewable 81 milliGRAM(s) Oral every 24 hours  atorvastatin 40 milliGRAM(s) Oral at bedtime  buPROPion XL (24-Hour) . 300 milliGRAM(s) Oral daily  enoxaparin Injectable 40 milliGRAM(s) SubCutaneous every 24 hours  influenza  Vaccine (HIGH DOSE) 0.7 milliLiter(s) IntraMuscular once  metoprolol succinate ER 25 milliGRAM(s) Oral daily  sertraline 25 milliGRAM(s) Oral daily  topiramate 25 milliGRAM(s) Oral daily    MEDICATIONS  (PRN):  acetaminophen     Tablet .. 650 milliGRAM(s) Oral every 6 hours PRN Temp greater or equal to 38C (100.4F), Mild Pain (1 - 3)  LORazepam     Tablet 0.5 milliGRAM(s) Oral daily PRN Anxiety      ALLERGIES:  Allergies    codeine (Unknown)  penicillins (Unknown)    Intolerances        LABS:  Labs Interpretation:                         13.3   8.00  )-----------( 195      ( 18 Oct 2023 05:30 )             39.9     10-18    139  |  105  |  16  ----------------------------<  95  3.6   |  24  |  0.53    Ca    9.0      18 Oct 2023 05:30  Phos  4.4     10-18  Mg     1.7     10-18    TPro  5.6<L>  /  Alb  3.6  /  TBili  0.4  /  DBili  0.2  /  AST  47<H>  /  ALT  76<H>  /  AlkPhos  78  10-17    PTT - ( 17 Oct 2023 12:10 )  PTT:53.9 sec  Urinalysis Basic - ( 18 Oct 2023 05:30 )    Color: x / Appearance: x / SG: x / pH: x  Gluc: 95 mg/dL / Ketone: x  / Bili: x / Urobili: x   Blood: x / Protein: x / Nitrite: x   Leuk Esterase: x / RBC: x / WBC x   Sq Epi: x / Non Sq Epi: x / Bacteria: x    RADIOLOGY & ADDITIONAL TESTS: Reviewed.    No acute tele events.

## 2023-10-18 NOTE — DIETITIAN INITIAL EVALUATION ADULT - PROBLEM SELECTOR PLAN 2
continue home regimen with sertaline, wellbutrin and topiramate ( all meds and dosages verified with patient's pharmacy)

## 2023-10-18 NOTE — PROGRESS NOTE ADULT - PROBLEM SELECTOR PLAN 3
F: 975 ns over 13 hrs  E: replenish prn  N: npo for ctca  Gi: none given  Dvt: enoxaparin  Dispo: 5lach F: 975 ns over 13 hrs  E: replenish prn  N: DASH with Ensure protein+  Pain regimen: Tylenol high dose prn   Gi: none given  Dvt: enoxaparin 40mg sq q24h  Dispo: 5lach

## 2023-10-18 NOTE — DIETITIAN INITIAL EVALUATION ADULT - PROBLEM SELECTOR PLAN 1
R/I for NSTEMI with trending up Troponin T --> 259 and elevated CKMB 8.4, CPK  242, normal CRP  TTE done at bedside - nl EF and no wall motion abnormalities, no effusion     even though CP appears atypical, reproducible on exam with palpation and worse with movement favoring pericarditis, as d/w with Dr Mai plan is to initiate heparin IV ACS protocol and trend CE  start high dose statin ( 40mg given advance age), continue asa  no nitrates or BB at present for possibility of RCA involvement   remains HD stable  trend troponins,  need for cath TBD in am - made NPO after MN and IVF ordered at 75 cc/h x 12 hrs as pt got CTA dye load

## 2023-10-18 NOTE — PHYSICAL THERAPY INITIAL EVALUATION ADULT - ADDITIONAL COMMENTS
Pt is a poor historian as pt is A&O x 1-2. States she lives alone and was previously independent. As per social work pt used a rollator to ambulate and has a shower with shower chair/grab bars. Pt was attending outpatient PT / vestibular therapy.

## 2023-10-19 ENCOUNTER — TRANSCRIPTION ENCOUNTER (OUTPATIENT)
Age: 83
End: 2023-10-19

## 2023-10-19 LAB
ALBUMIN SERPL ELPH-MCNC: 3.5 G/DL — SIGNIFICANT CHANGE UP (ref 3.3–5)
ALBUMIN SERPL ELPH-MCNC: 3.5 G/DL — SIGNIFICANT CHANGE UP (ref 3.3–5)
ALP SERPL-CCNC: 80 U/L — SIGNIFICANT CHANGE UP (ref 40–120)
ALP SERPL-CCNC: 80 U/L — SIGNIFICANT CHANGE UP (ref 40–120)
ALT FLD-CCNC: 56 U/L — HIGH (ref 10–45)
ALT FLD-CCNC: 56 U/L — HIGH (ref 10–45)
ANION GAP SERPL CALC-SCNC: 12 MMOL/L — SIGNIFICANT CHANGE UP (ref 5–17)
ANION GAP SERPL CALC-SCNC: 12 MMOL/L — SIGNIFICANT CHANGE UP (ref 5–17)
AST SERPL-CCNC: 35 U/L — SIGNIFICANT CHANGE UP (ref 10–40)
AST SERPL-CCNC: 35 U/L — SIGNIFICANT CHANGE UP (ref 10–40)
BASOPHILS # BLD AUTO: 0.02 K/UL — SIGNIFICANT CHANGE UP (ref 0–0.2)
BASOPHILS # BLD AUTO: 0.02 K/UL — SIGNIFICANT CHANGE UP (ref 0–0.2)
BASOPHILS NFR BLD AUTO: 0.3 % — SIGNIFICANT CHANGE UP (ref 0–2)
BASOPHILS NFR BLD AUTO: 0.3 % — SIGNIFICANT CHANGE UP (ref 0–2)
BILIRUB SERPL-MCNC: 0.4 MG/DL — SIGNIFICANT CHANGE UP (ref 0.2–1.2)
BILIRUB SERPL-MCNC: 0.4 MG/DL — SIGNIFICANT CHANGE UP (ref 0.2–1.2)
BUN SERPL-MCNC: 13 MG/DL — SIGNIFICANT CHANGE UP (ref 7–23)
BUN SERPL-MCNC: 13 MG/DL — SIGNIFICANT CHANGE UP (ref 7–23)
CALCIUM SERPL-MCNC: 8.9 MG/DL — SIGNIFICANT CHANGE UP (ref 8.4–10.5)
CALCIUM SERPL-MCNC: 8.9 MG/DL — SIGNIFICANT CHANGE UP (ref 8.4–10.5)
CHLORIDE SERPL-SCNC: 104 MMOL/L — SIGNIFICANT CHANGE UP (ref 96–108)
CHLORIDE SERPL-SCNC: 104 MMOL/L — SIGNIFICANT CHANGE UP (ref 96–108)
CO2 SERPL-SCNC: 25 MMOL/L — SIGNIFICANT CHANGE UP (ref 22–31)
CO2 SERPL-SCNC: 25 MMOL/L — SIGNIFICANT CHANGE UP (ref 22–31)
CREAT SERPL-MCNC: 0.6 MG/DL — SIGNIFICANT CHANGE UP (ref 0.5–1.3)
CREAT SERPL-MCNC: 0.6 MG/DL — SIGNIFICANT CHANGE UP (ref 0.5–1.3)
CULTURE RESULTS: SIGNIFICANT CHANGE UP
CULTURE RESULTS: SIGNIFICANT CHANGE UP
EGFR: 88 ML/MIN/1.73M2 — SIGNIFICANT CHANGE UP
EGFR: 88 ML/MIN/1.73M2 — SIGNIFICANT CHANGE UP
EOSINOPHIL # BLD AUTO: 0.21 K/UL — SIGNIFICANT CHANGE UP (ref 0–0.5)
EOSINOPHIL # BLD AUTO: 0.21 K/UL — SIGNIFICANT CHANGE UP (ref 0–0.5)
EOSINOPHIL NFR BLD AUTO: 3.2 % — SIGNIFICANT CHANGE UP (ref 0–6)
EOSINOPHIL NFR BLD AUTO: 3.2 % — SIGNIFICANT CHANGE UP (ref 0–6)
GLUCOSE SERPL-MCNC: 95 MG/DL — SIGNIFICANT CHANGE UP (ref 70–99)
GLUCOSE SERPL-MCNC: 95 MG/DL — SIGNIFICANT CHANGE UP (ref 70–99)
HCT VFR BLD CALC: 38.1 % — SIGNIFICANT CHANGE UP (ref 34.5–45)
HCT VFR BLD CALC: 38.1 % — SIGNIFICANT CHANGE UP (ref 34.5–45)
HGB BLD-MCNC: 12.4 G/DL — SIGNIFICANT CHANGE UP (ref 11.5–15.5)
HGB BLD-MCNC: 12.4 G/DL — SIGNIFICANT CHANGE UP (ref 11.5–15.5)
IMM GRANULOCYTES NFR BLD AUTO: 0.6 % — SIGNIFICANT CHANGE UP (ref 0–0.9)
IMM GRANULOCYTES NFR BLD AUTO: 0.6 % — SIGNIFICANT CHANGE UP (ref 0–0.9)
LYMPHOCYTES # BLD AUTO: 1.18 K/UL — SIGNIFICANT CHANGE UP (ref 1–3.3)
LYMPHOCYTES # BLD AUTO: 1.18 K/UL — SIGNIFICANT CHANGE UP (ref 1–3.3)
LYMPHOCYTES # BLD AUTO: 18.1 % — SIGNIFICANT CHANGE UP (ref 13–44)
LYMPHOCYTES # BLD AUTO: 18.1 % — SIGNIFICANT CHANGE UP (ref 13–44)
MAGNESIUM SERPL-MCNC: 1.8 MG/DL — SIGNIFICANT CHANGE UP (ref 1.6–2.6)
MAGNESIUM SERPL-MCNC: 1.8 MG/DL — SIGNIFICANT CHANGE UP (ref 1.6–2.6)
MCHC RBC-ENTMCNC: 30.8 PG — SIGNIFICANT CHANGE UP (ref 27–34)
MCHC RBC-ENTMCNC: 30.8 PG — SIGNIFICANT CHANGE UP (ref 27–34)
MCHC RBC-ENTMCNC: 32.5 GM/DL — SIGNIFICANT CHANGE UP (ref 32–36)
MCHC RBC-ENTMCNC: 32.5 GM/DL — SIGNIFICANT CHANGE UP (ref 32–36)
MCV RBC AUTO: 94.5 FL — SIGNIFICANT CHANGE UP (ref 80–100)
MCV RBC AUTO: 94.5 FL — SIGNIFICANT CHANGE UP (ref 80–100)
MONOCYTES # BLD AUTO: 0.77 K/UL — SIGNIFICANT CHANGE UP (ref 0–0.9)
MONOCYTES # BLD AUTO: 0.77 K/UL — SIGNIFICANT CHANGE UP (ref 0–0.9)
MONOCYTES NFR BLD AUTO: 11.8 % — SIGNIFICANT CHANGE UP (ref 2–14)
MONOCYTES NFR BLD AUTO: 11.8 % — SIGNIFICANT CHANGE UP (ref 2–14)
NEUTROPHILS # BLD AUTO: 4.29 K/UL — SIGNIFICANT CHANGE UP (ref 1.8–7.4)
NEUTROPHILS # BLD AUTO: 4.29 K/UL — SIGNIFICANT CHANGE UP (ref 1.8–7.4)
NEUTROPHILS NFR BLD AUTO: 66 % — SIGNIFICANT CHANGE UP (ref 43–77)
NEUTROPHILS NFR BLD AUTO: 66 % — SIGNIFICANT CHANGE UP (ref 43–77)
NRBC # BLD: 0 /100 WBCS — SIGNIFICANT CHANGE UP (ref 0–0)
NRBC # BLD: 0 /100 WBCS — SIGNIFICANT CHANGE UP (ref 0–0)
PHOSPHATE SERPL-MCNC: 2.5 MG/DL — SIGNIFICANT CHANGE UP (ref 2.5–4.5)
PHOSPHATE SERPL-MCNC: 2.5 MG/DL — SIGNIFICANT CHANGE UP (ref 2.5–4.5)
PLATELET # BLD AUTO: 198 K/UL — SIGNIFICANT CHANGE UP (ref 150–400)
PLATELET # BLD AUTO: 198 K/UL — SIGNIFICANT CHANGE UP (ref 150–400)
POTASSIUM SERPL-MCNC: 3.4 MMOL/L — LOW (ref 3.5–5.3)
POTASSIUM SERPL-MCNC: 3.4 MMOL/L — LOW (ref 3.5–5.3)
POTASSIUM SERPL-SCNC: 3.4 MMOL/L — LOW (ref 3.5–5.3)
POTASSIUM SERPL-SCNC: 3.4 MMOL/L — LOW (ref 3.5–5.3)
PROT SERPL-MCNC: 6.2 G/DL — SIGNIFICANT CHANGE UP (ref 6–8.3)
PROT SERPL-MCNC: 6.2 G/DL — SIGNIFICANT CHANGE UP (ref 6–8.3)
RBC # BLD: 4.03 M/UL — SIGNIFICANT CHANGE UP (ref 3.8–5.2)
RBC # BLD: 4.03 M/UL — SIGNIFICANT CHANGE UP (ref 3.8–5.2)
RBC # FLD: 13.1 % — SIGNIFICANT CHANGE UP (ref 10.3–14.5)
RBC # FLD: 13.1 % — SIGNIFICANT CHANGE UP (ref 10.3–14.5)
SODIUM SERPL-SCNC: 141 MMOL/L — SIGNIFICANT CHANGE UP (ref 135–145)
SODIUM SERPL-SCNC: 141 MMOL/L — SIGNIFICANT CHANGE UP (ref 135–145)
SPECIMEN SOURCE: SIGNIFICANT CHANGE UP
SPECIMEN SOURCE: SIGNIFICANT CHANGE UP
WBC # BLD: 6.51 K/UL — SIGNIFICANT CHANGE UP (ref 3.8–10.5)
WBC # BLD: 6.51 K/UL — SIGNIFICANT CHANGE UP (ref 3.8–10.5)
WBC # FLD AUTO: 6.51 K/UL — SIGNIFICANT CHANGE UP (ref 3.8–10.5)
WBC # FLD AUTO: 6.51 K/UL — SIGNIFICANT CHANGE UP (ref 3.8–10.5)

## 2023-10-19 PROCEDURE — 99233 SBSQ HOSP IP/OBS HIGH 50: CPT

## 2023-10-19 RX ORDER — SODIUM CHLORIDE 9 MG/ML
1000 INJECTION INTRAMUSCULAR; INTRAVENOUS; SUBCUTANEOUS
Refills: 0 | Status: COMPLETED | OUTPATIENT
Start: 2023-10-20 | End: 2023-10-20

## 2023-10-19 RX ORDER — IBUPROFEN 200 MG
400 TABLET ORAL ONCE
Refills: 0 | Status: COMPLETED | OUTPATIENT
Start: 2023-10-19 | End: 2023-10-19

## 2023-10-19 RX ORDER — POTASSIUM CHLORIDE 20 MEQ
40 PACKET (EA) ORAL EVERY 4 HOURS
Refills: 0 | Status: COMPLETED | OUTPATIENT
Start: 2023-10-19 | End: 2023-10-19

## 2023-10-19 RX ORDER — LIDOCAINE 4 G/100G
2 CREAM TOPICAL ONCE
Refills: 0 | Status: COMPLETED | OUTPATIENT
Start: 2023-10-19 | End: 2023-10-19

## 2023-10-19 RX ADMIN — Medication 400 MILLIGRAM(S): at 15:00

## 2023-10-19 RX ADMIN — Medication 400 MILLIGRAM(S): at 14:32

## 2023-10-19 RX ADMIN — Medication 40 MILLIEQUIVALENT(S): at 11:57

## 2023-10-19 RX ADMIN — ATORVASTATIN CALCIUM 40 MILLIGRAM(S): 80 TABLET, FILM COATED ORAL at 23:51

## 2023-10-19 RX ADMIN — BUPROPION HYDROCHLORIDE 300 MILLIGRAM(S): 150 TABLET, EXTENDED RELEASE ORAL at 11:57

## 2023-10-19 RX ADMIN — LIDOCAINE 2 PATCH: 4 CREAM TOPICAL at 14:32

## 2023-10-19 RX ADMIN — LIDOCAINE 2 PATCH: 4 CREAM TOPICAL at 19:02

## 2023-10-19 RX ADMIN — SERTRALINE 25 MILLIGRAM(S): 25 TABLET, FILM COATED ORAL at 11:56

## 2023-10-19 RX ADMIN — Medication 25 MILLIGRAM(S): at 11:57

## 2023-10-19 RX ADMIN — Medication 81 MILLIGRAM(S): at 11:56

## 2023-10-19 RX ADMIN — Medication 40 MILLIEQUIVALENT(S): at 09:16

## 2023-10-19 RX ADMIN — Medication 25 MILLIGRAM(S): at 05:46

## 2023-10-19 NOTE — DISCHARGE NOTE PROVIDER - ATTENDING DISCHARGE PHYSICAL EXAMINATION:
82 y/o F with PMHx of Meniere's disease, vertigo, s/p traumatic falls earlier this year, anxiety disorder and no prior cardiac history, who was brought to [facility] ED 10/16/23 with new onset of typical chest pain, found to have NSTEMI. Subsequent CCTA on 10/17/23 revealing to be CT-FFR positive for pLAD lesion, found to have intermediate coronary blockages with FFR results supporting need for cardiac catheterization.    1. NSTEMI type 1  New onset chest pain, + risk factors, slightly elevated hs troponins 250s. Underwent CCTA for further evaluation with CT-FFR positive for proximal LAD lesion. Subsequently underwent [facility] with PCI uncomplicated. Normal RHC and normal echocardiogram.  Discharged on aspirin 81 mg, plavix 75 mg, atorvastatin 40 mg, and metoprolol 25 mg.  Referred to cardiac rehab.

## 2023-10-19 NOTE — PROGRESS NOTE ADULT - PROBLEM SELECTOR PLAN 3
F: 975 ns over 13 hrs  E: replenish prn  N: DASH with Ensure protein+  Pain regimen: Tylenol high dose prn   Gi: none given  Dvt: enoxaparin 40mg sq q24h  Dispo: 5lach F: s/p 975 ns over 13 hrs  E: replenish prn  N: DASH with Ensure protein+. NPO after midnight starting 10/20 midnight for cath  Pain regimen: Tylenol high dose prn, and motrin/lidocaine patch  Gi: none given  Dvt: enoxaparin 40mg sq q24h  Dispo: 5lach

## 2023-10-19 NOTE — DISCHARGE NOTE PROVIDER - CARE PROVIDER_API CALL
DEENA TURCIOS  Follow Up Time: 1 week   Keon Mai  Cardiology  [address] [address]  Phone: [phone]  Fax: [phone]  Follow Up Time:    Jose Calle  Cardiology  [address] [place] [address]  Phone: [phone]  Fax: [phone]  Scheduled Appointment: 10/27/2023 11:40 AM

## 2023-10-19 NOTE — OCCUPATIONAL THERAPY INITIAL EVALUATION ADULT - MD ORDER
New onset of chest discomfort  NSTEMI (non-ST elevation myocardial infarction)  H/o Anxiety Disorder

## 2023-10-19 NOTE — DISCHARGE NOTE PROVIDER - NSDCFUSCHEDAPPT_GEN_ALL_CORE_FT
Jose aClle Physician [facility]  [facility] [address]  Scheduled Appointment: 10/27/2023

## 2023-10-19 NOTE — DISCHARGE NOTE PROVIDER - NSDCCPTREATMENT_GEN_ALL_CORE_FT
PRINCIPAL PROCEDURE  Procedure: Transthoracic echocardiography (TTE)  Findings and Treatment: CONCLUSIONS:   1. Normal left and right ventricular size and systolic function.   2. No significant valvular disease.   3. No evidence of pulmonary hypertension.   4. No pericardial effusion.        SECONDARY PROCEDURE  Procedure: CT angiogram, cardiac  Findings and Treatment: CT-FFR is positive. However, overall does not suggest a discrete   physiologically significant lesion: The flow is < or = to 0.80 in one or   more segments of one or more coronary arteries (perhaps becauseit is a   distal segment (vessel size is less than 2 mm)  The results of the CT-FFR analysis are:  Left main: 0.99 (1)  LAD: 0.70 proximal (3  RI: 0.98 (1);  LCx: 0.96 (1)  RCA: 0.89 mid (1)  Total Agatston Score: 1104.  LM Agatston Score: 110.  LAD Agatston Score: 487.  LCX Agatston Score: 243.  RCA Agatston Score: 264.  CLAIRE Percentile Rank: Patient is above validated age.  LM (5): Less than 50% stenosis due to calcific plaque  LAD:  Prox (6): Calcific plaque obscures the lumen; stenosis severity is   indeterminate  Mid (7): Mild stenosis due to noncalcific and calcific plaque  Distal (8): Normal ; large wrap-around LAD.  D1 (9): Non-obstructive ; small caliber vessel.  D2 (10): Non-obstructive ; small caliber vessel.  LCX:  Prox (11): Normal  OM1(12): Probably normal  Mid (13): Normal  OM2 (14): N/A  LPDA (15): N/A  RI (17): Mild stenosis due to noncalcific plaque  LPL (18): Normal  RCA:  Prox (1): Mild stenosis due to calcific plaque  Mid (2): Calcific plaque obscures the lumen; stenosis severity is   indeterminate  Distal (3): Normal  RPDA (4): Normal  RPL (16): Normal ; small caliber vessel.

## 2023-10-19 NOTE — DISCHARGE NOTE PROVIDER - PROVIDER TOKENS
PROVIDER:[TOKEN:[[id number]],FOLLOWUP:[1 week]] PROVIDER:[TOKEN:[[id number]]] PROVIDER:[TOKEN:[[id number]],SCHEDULEDAPPT:[10/27/2023],SCHEDULEDAPPTTIME:[11:40 AM]]

## 2023-10-19 NOTE — PROGRESS NOTE ADULT - PROBLEM SELECTOR PLAN 1
R/I for NSTEMI with trending up Troponin T --> 259 and now downtrended normal CRP, UA wnl, Lipid profile wnl.   TTE done at bedside - nl EF and no wall motion abnormalities, no effusion, no ventricular dysfx     CT cardiac angio:   1.  The calcium score is severe at 1104 Agatston units.  2.  Less than 50% stenosis of left main coronary artery.  3.  Calcific plaque obscures the lumen of proximal LAD, and mid RCA   precluding assessment of stenosis severity.  4.  Remaining segments demonstrate non-obstructive coronary artery   disease.  5. No PE, no effusions     - c/w atorvastatin 40mg po qd, toprol 25mg po qd, aspirin 81mg po qd  - avoid nitrates for possibility of RCA involvement   - CT cardiac angio rec FFr. -> pending FFR ctca vs cath, possibly 10/19 R/I for NSTEMI with trending up Troponin T --> 259 and now downtrended normal CRP, UA wnl, Lipid profile wnl.   TTE done at bedside - nl EF and no wall motion abnormalities, no effusion, no ventricular dysfx     CT cardiac angio:   1.  The calcium score is severe at 1104 Agatston units.  2.  Less than 50% stenosis of left main coronary artery.  3.  Calcific plaque obscures the lumen of proximal LAD, and mid RCA   precluding assessment of stenosis severity.  4.  Remaining segments demonstrate non-obstructive coronary artery   disease.  5. No PE, no effusions     - c/w atorvastatin 40mg po qd, toprol 25mg po qd, aspirin 81mg po qd  - avoid nitrates for possibility of RCA involvement   - CT cardiac angio rec FFr. -> FFR ctca vs cath, endorsing catheterization, planned for 10/20.   - Will hold lovenox till after cath, continue w/aspirin

## 2023-10-19 NOTE — PROGRESS NOTE ADULT - SUBJECTIVE AND OBJECTIVE BOX
Patient is a 83y old  Female who presents with a chief complaint of CHEST PAIN     (18 Oct 2023 15:43)    HPI:  84 y/o F with PMH  Meniere's disease (getting worse as per patient), vertigo and  poor balance, s/p  traumatic falls earlier this year,  hx anxiety disorder and no prior cardiac hx, who was BIBA to [facility] ED 10/16/23 with new onset of chest discomfort since this morning.     At baseline patient is independent and lives alone, ambulates with walker secondary to vertigo and balance issues, recently saw several neuro specialist for progression of her neuro symptoms and memory decline. Patient said she woke up this morning not feeling well generally but without any CP, CP  started after she got up and walked and got back to bed. She describes 10/10  crushing chest discomfort all over precordium going to her upper back and shoulders with movement, better at rest, + dizziness ( not usual vertigo). Denies N/V, diaphoresis, syncope. Denies similar symptoms in the past, denies any recent viral illness. Says had "normal"  cardiac work up within past year including TTE and NST ( no reports available at the time of admission).     In ED with c/o crushing  CP  on movement that is reproducible, /78 -->143/78, HR 78, RR 20, O2 sat 100% RA, EKG with  NSR and no ischemic changes, CTA  chest negative for PE and dissection   given IV tylenol 700mg in ED with no reported improvement but appears comfortable at rest  troponin HS T 206--> 259, CK MB elevated at  8.4  and   given asa 324mg to chew     Patient is admitted to tele/cards Dr Mai.  As d/w Attending, STAT portable TTE ordered to look for WMA, heparin drip was  deferred until ECHO is done    Update at  17:40PM  TTE done at bedside :  1. Normal left and right ventricular size and systolic function. NO WMA   2. No significant valvular disease.   3. No evidence of pulmonary hypertension.   4. No pericardial effusion. (16 Oct 2023 16:24)    = it IS THE summary line (true HPI is in subjective)    *[Add on prior hx that can further clarify case such as PCI hx or what led to this situation]*     INTERVAL HPI/OVERNIGHT EVENTS:   No overnight events   Afebrile, hemodynamically stable     Subjective:    ICU Vital Signs Last 24 Hrs  T(C): 37.1 (19 Oct 2023 13:39), Max: 37.1 (18 Oct 2023 19:02)  T(F): 98.7 (19 Oct 2023 13:39), Max: 98.8 (18 Oct 2023 19:02)  HR: 73 (19 Oct 2023 15:31) (67 - 73)  BP: 149/64 (19 Oct 2023 15:31) (141/67 - 159/72)  BP(mean): 92 (19 Oct 2023 15:31) (92 - 103)  ABP: --  ABP(mean): --  RR: 22 (19 Oct 2023 15:31) (17 - 26)  SpO2: 98% (19 Oct 2023 15:31) (97% - 99%)    O2 Parameters below as of 19 Oct 2023 15:31  Patient On (Oxygen Delivery Method): room air          I&O's Summary    18 Oct 2023 07:01  -  19 Oct 2023 07:00  --------------------------------------------------------  IN: 140 mL / OUT: 650 mL / NET: -510 mL    19 Oct 2023 07:01  -  19 Oct 2023 15:56  --------------------------------------------------------  IN: 476 mL / OUT: 0 mL / NET: 476 mL          Daily     Daily     Adult Advanced Hemodynamics Last 24 Hrs  CVP(mm Hg): --  CVP(cm H2O): --  CO: --  CI: --  PA: --  PA(mean): --  PCWP: --  SVR: --  SVRI: --  PVR: --  PVRI: --    EKG/Telemetry Events:    MEDICATIONS  (STANDING):  aspirin  chewable 81 milliGRAM(s) Oral every 24 hours  atorvastatin 40 milliGRAM(s) Oral at bedtime  buPROPion XL (24-Hour) . 300 milliGRAM(s) Oral daily  influenza  Vaccine (HIGH DOSE) 0.7 milliLiter(s) IntraMuscular once  metoprolol succinate ER 25 milliGRAM(s) Oral daily  sertraline 25 milliGRAM(s) Oral daily  topiramate 25 milliGRAM(s) Oral daily    MEDICATIONS  (PRN):  acetaminophen     Tablet .. 650 milliGRAM(s) Oral every 6 hours PRN Temp greater or equal to 38C (100.4F), Mild Pain (1 - 3)  LORazepam     Tablet 0.5 milliGRAM(s) Oral daily PRN Anxiety      PHYSICAL EXAM:  GENERAL:   HEAD:  Atraumatic, Normocephalic  EYES: EOMI, PERRLA, conjunctiva and sclera clear  NECK: Supple, No JVD, Normal thyroid, no enlarged nodes  NERVOUS SYSTEM:  Alert & Awake.   CHEST/LUNG: B/L good air entry; No rales, rhonchi, or wheezing  HEART: S1S2 normal, no S3, Regular rate and rhythm; No murmurs  ABDOMEN: Soft, Nontender, Nondistended; Bowel sounds present  EXTREMITIES:  2+ Peripheral Pulses, No clubbing, cyanosis, or edema  LYMPH: No lymphadenopathy noted  SKIN: No rashes or lesions  -> Lines:                Foley________________IVs_____________________________________________    LABS:                        12.4   6.51  )-----------( 198      ( 19 Oct 2023 05:30 )             38.1     10-19    141  |  104  |  13  ----------------------------<  95  3.4<L>   |  25  |  0.60    Ca    8.9      19 Oct 2023 05:30  Phos  2.5     10-19  Mg     1.8     10-19    TPro  6.2  /  Alb  3.5  /  TBili  0.4  /  DBili  x   /  AST  35  /  ALT  56<H>  /  AlkPhos  80  10-19    LIVER FUNCTIONS - ( 19 Oct 2023 05:30 )  Alb: 3.5 g/dL / Pro: 6.2 g/dL / ALK PHOS: 80 U/L / ALT: 56 U/L / AST: 35 U/L / GGT: x             CAPILLARY BLOOD GLUCOSE                Urinalysis Basic - ( 19 Oct 2023 05:30 )    Color: x / Appearance: x / SG: x / pH: x  Gluc: 95 mg/dL / Ketone: x  / Bili: x / Urobili: x   Blood: x / Protein: x / Nitrite: x   Leuk Esterase: x / RBC: x / WBC x   Sq Epi: x / Non Sq Epi: x / Bacteria: x          RADIOLOGY & ADDITIONAL TESTS:  CXR:  Others:       Care Discussed with Consultants/Other Providers [ x] YES  [ ] NO         Patient is a 83y old  Female who presents with a chief complaint of CHEST PAIN    INTERVAL HPI/OVERNIGHT EVENTS:   No overnight events   Afebrile, hemodynamically stable     Subjective:  Was able to express appropriate understanding of her current testing and treatment regimen, with anticipated testing and cardiac catheterization planning. Endorsed understanding the procedures, plan, and was able to express logical thought process demonstrating understanding risks and benefits, with multiple teach back sessions using closed loop communication. Wishes to proceed with the catheterization for 10/20. Denies any new ongoing concern.     ROS: negative unless otherwise stated above.      ICU Vital Signs Last 24 Hrs  T(C): 37.1 (19 Oct 2023 13:39), Max: 37.1 (18 Oct 2023 19:02)  T(F): 98.7 (19 Oct 2023 13:39), Max: 98.8 (18 Oct 2023 19:02)  HR: 73 (19 Oct 2023 15:31) (67 - 73)  BP: 149/64 (19 Oct 2023 15:31) (141/67 - 159/72)  BP(mean): 92 (19 Oct 2023 15:31) (92 - 103)  ABP: --  ABP(mean): --  RR: 22 (19 Oct 2023 15:31) (17 - 26)  SpO2: 98% (19 Oct 2023 15:31) (97% - 99%)    O2 Parameters below as of 19 Oct 2023 15:31  Patient On (Oxygen Delivery Method): room air          I&O's Summary    18 Oct 2023 07:01  -  19 Oct 2023 07:00  --------------------------------------------------------  IN: 140 mL / OUT: 650 mL / NET: -510 mL    19 Oct 2023 07:01  -  19 Oct 2023 15:56  --------------------------------------------------------  IN: 476 mL / OUT: 0 mL / NET: 476 mL    EKG/Telemetry Events: no tele events     MEDICATIONS  (STANDING):  aspirin  chewable 81 milliGRAM(s) Oral every 24 hours  atorvastatin 40 milliGRAM(s) Oral at bedtime  buPROPion XL (24-Hour) . 300 milliGRAM(s) Oral daily  influenza  Vaccine (HIGH DOSE) 0.7 milliLiter(s) IntraMuscular once  metoprolol succinate ER 25 milliGRAM(s) Oral daily  sertraline 25 milliGRAM(s) Oral daily  topiramate 25 milliGRAM(s) Oral daily    MEDICATIONS  (PRN):  acetaminophen     Tablet .. 650 milliGRAM(s) Oral every 6 hours PRN Temp greater or equal to 38C (100.4F), Mild Pain (1 - 3)  LORazepam     Tablet 0.5 milliGRAM(s) Oral daily PRN Anxiety      PHYSICAL EXAM:  General: Mild distress due to pain. Frail and cachectic appearing.   HEENT: MMM  Neck: supple  Cardiovascular: +S1/S2; RRR. Pleuritic c/p.   Respiratory: CTA B/L; no W/R/R  Gastrointestinal: soft, NT/ND  Extremities: WWP; no edema, clubbing or cyanosis  Vascular: 2+ radial, DP/PT pulses B/L  Neurological: AAOx2, not to date, no focal deficits  Psych: Waxing and waning attention span and orientation. Worsens with sedatives and CNS depressants. Able to be reoriented with teachback and once being made aware of time/place/situation. Linear thought process, content, with appropriate insight and judgement when alert.     LABS:                        12.4   6.51  )-----------( 198      ( 19 Oct 2023 05:30 )             38.1     10-19    141  |  104  |  13  ----------------------------<  95  3.4<L>   |  25  |  0.60    Ca    8.9      19 Oct 2023 05:30  Phos  2.5     10-19  Mg     1.8     10-19    TPro  6.2  /  Alb  3.5  /  TBili  0.4  /  DBili  x   /  AST  35  /  ALT  56<H>  /  AlkPhos  80  10-19    LIVER FUNCTIONS - ( 19 Oct 2023 05:30 )  Alb: 3.5 g/dL / Pro: 6.2 g/dL / ALK PHOS: 80 U/L / ALT: 56 U/L / AST: 35 U/L / GGT: x               Urinalysis Basic - ( 19 Oct 2023 05:30 )    Color: x / Appearance: x / SG: x / pH: x  Gluc: 95 mg/dL / Ketone: x  / Bili: x / Urobili: x   Blood: x / Protein: x / Nitrite: x   Leuk Esterase: x / RBC: x / WBC x   Sq Epi: x / Non Sq Epi: x / Bacteria: x      RADIOLOGY & ADDITIONAL TESTS: reviewed

## 2023-10-19 NOTE — OCCUPATIONAL THERAPY INITIAL EVALUATION ADULT - ADDITIONAL COMMENTS
Pt lives alone in apt w/ elevator access. Pt states that she was independent prior to admission. Pt used a rollator for ambulation, no other AEs/DMEs reported.

## 2023-10-19 NOTE — PROGRESS NOTE ADULT - ASSESSMENT
82 y/o F with PMH  Meniere's disease (getting worse as per patient), vertigo and  poor balance, s/p  traumatic falls earlier this year,  hx anxiety disorder and no prior cardiac hx, who was BIBA to [facility] ED 10/16/23 with new onset of atypical chest pain, reflecting potential unstable angina vs NSTEMI from typical angina, high Agatston score on Cardiac CT angio, pending FFR cardiac angio given risk vs benefit patient profile of immediate cardiac cath  82 y/o F with PMH  Meniere's disease (getting worse as per patient), vertigo and  poor balance, s/p  traumatic falls earlier this year,  hx anxiety disorder and no prior cardiac hx, who was BIBA to [facility] ED 10/16/23 with new onset of atypical chest pain, reflecting potential unstable angina vs NSTEMI from typical angina, high Agatston score on Cardiac CT angio, found to have intermediate coronary blockages with FFR results supporting catheterization, scheduled for 10/20.

## 2023-10-19 NOTE — OCCUPATIONAL THERAPY INITIAL EVALUATION ADULT - PERTINENT HX OF CURRENT PROBLEM, REHAB EVAL
4 y/o F with PMH  Meniere's disease (getting worse as per patient), vertigo and  poor balance, s/p  traumatic falls earlier this year,  hx anxiety disorder and no prior cardiac hx, who was BIBA to [facility] ED 10/16/23 with new onset of chest discomfort. At baseline patient is independent and lives alone, ambulates with walker secondary to vertigo and balance issues, recently saw several neuro specialist for progression of her neuro symptoms and memory decline. Patient said she woke up not feeling well generally but without any CP, CP  started after she got up and walked and got back to bed. She describes 10/10  crushing chest discomfort all over precordium going to her upper back and shoulders with movement, better at rest, + dizziness ( not usual vertigo).

## 2023-10-19 NOTE — DISCHARGE NOTE PROVIDER - HOSPITAL COURSE
SUMMARY  84 y/o F with PMH  Meniere's disease (getting worse as per patient), vertigo and  poor balance, s/p  traumatic falls earlier this year,  hx anxiety disorder and no prior cardiac hx, who was BIBA to [facility] ED 10/16/23 with new onset of atypical chest pain, reflecting potential unstable angina vs NSTEMI from typical angina, high Agatston score on Cardiac CT angio, found to have intermediate coronary blockages with FFR results supporting catheterization, now s/p R/L heart cath 10/20 ([facility] - pLAD 95% and calcified; received scoreflex/shockwave/DORITA x2 to pLAD. RCA mod dz, LCx mild dz, RHC - RA 3, RV 29/6, PA 26/9, PCWP 8, CO/CI (geraldine) 3.93/2.69, PA sat 73%, Ao sat 100%) now pending [facility]ENN.      84 y/o F, (recently made DNI/DNR 10/20/23, rescinded for [facility] with possible intervention, scheduled for 10/20/23), no known history CAD, PMHx, Meniere's disease (getting worse as per patient), vertigo and  poor balance, s/p  traumatic falls earlier this year, hx anxiety disorder, h/o sundowning (currently on enhanced supervision), initially BIBA to [facility] ED 10/16/23 with new onset of atypical chest pain, found with Hs Trops , peaked @ 259, R/I NSTEMI with subsequent CCTA 10/17/23 with CT-FFR positive for pLAD lesion.   TTE 10/16: EF preserved, no significant valvulopathies    Pt seen at bedside today, examined, VSS, however still endorsing, 3-4/10 CP at rest, worsened with exertion, relieved with shallow breathing (also been treated for poss Pericarditis, on Motrin/tylenol).  Of note: pt was heparinized  (10/16-10/17), s/p ASA load and is now continued on ASA 81mg and Atorvastatin 40mg QHS. IVF hydration administered overnight in lieu of C.        Procedure: Left heart catheterization and PCI with DORITA of the   Discharge Diagnosis: NSTEMI with Cardiac catheterization, PCI with shockwave & DESx2 to pLAD.   Indication for PCI: New onset typical angina  Admitted as inpatient secondary to: Needing cardiac catheterization    Problem List:   1. NSTEMI   No past medical history of any cardiac events or etiologies, and brought to [facility] ED 10/16/23 with new onset of typical chest pain. Found to have elevated troponins, peaked to 259 and began trending down, with admitting diagnosis of NSTEMI with subsequent CCTA 10/17/23 with CT-FFR positive for pLAD lesion. TTE on 10/16 showed EF preserved and no significant valvulopathies, clearing cardiac catheterization on 10/20, and was given ASA load, then continued with ASA 81mg qd and Atorvastatin 40mg QHS, with IVF hydration administered overnight in lieu of [facility]. Catheterization revealed LHC - pLAD 95% and calcified; received scoreflex, shockwave & DORITA x2 to pLAD, RCA moderate disease, LCx mild disease. Hemodynamic parameters were: RA 3, RV 29/6, PA 26/9, PCWP 8, CO/CI (geraldine) 3.93/2.69, PA sat 73%, Ao sat 100%. She had successful removal of sutures and right groin access, with no subsequent complications or hematomas. Patient is stable for discharge.   Plan: Continue with Aspirin 81mg po qd, Plavix 75mg po qd, follow up in 1 week for reassessment.        Plan:   1. Discharge home today  2. Follow up as an outpatient with:   3. Post procedure teaching done including importance of medication adherence and access site care and monitoring.  4. DAPT: Aspirin 81mg po qd, Plavix 75mg po qd  5. Statin: Atorvastatin 40mg qhs  6. Beta Blocker: Toprol 25mg qd     Cardiac maintenance: Cardiac rehab  Medication changes: None   New medications: Toprol 25mg qd, Aspirin 81mg po qd, Plavix 75mg po qd, Atorvastatin 40mg qhs    Items to follow up out patient: Outpatient Cardiology followup SUMMARY  84 y/o F with PMH  Meniere's disease (getting worse as per patient), vertigo and  poor balance, s/p traumatic falls earlier this year, anxiety disorder and no prior cardiac history, who was brought to [facility] ED 10/16/23 with new onset of typical chest pain, found to have NSTEMI . Subsequent CCTA 10/17/23 revealing to be CT-FFR positive for pLAD lesion, found to have intermediate coronary blockages with FFR results supporting catheterization, now s/p R/L heart catheterization on 10/20 with shockwave & DORITA x2 to pLAD, and medically stable to be discharged with outpatient Cardiology followup.      Procedure: Left heart catheterization and PCI with DORITA of the   Discharge Diagnosis: NSTEMI with Cardiac catheterization, PCI with shockwave & DESx2 to pLAD.   Indication for PCI: New onset typical angina  Admitted as inpatient secondary to: Needing cardiac catheterization    Problem List:   1. NSTEMI   No past medical history of any cardiac events or etiologies, and brought to [facility] ED 10/16/23 with new onset of typical chest pain. Found to have elevated troponins, peaked to 259 and began trending down, with admitting diagnosis of NSTEMI with subsequent CCTA 10/17/23 with CT-FFR positive for pLAD lesion. TTE on 10/16 showed EF preserved and no significant valvulopathies, clearing cardiac catheterization on 10/20, and was given ASA load, then continued with ASA 81mg qd and Atorvastatin 40mg QHS, with IVF hydration administered overnight in lieu of LHC. Catheterization revealed LHC - pLAD 95% and calcified; received scoreflex, shockwave & DORITA x2 to pLAD, RCA moderate disease, LCx mild disease. Hemodynamic parameters were: RA 3, RV 29/6, PA 26/9, PCWP 8, CO/CI (geraldine) 3.93/2.69, PA sat 73%, Ao sat 100%. She had successful removal of sutures and right groin access, with no subsequent complications or hematomas. Patient is stable for discharge.   Plan: Continue with Aspirin 81mg po qd, Plavix 75mg po qd, follow up in 1 week for reassessment.        Plan:   1. Discharge home today  2. Follow up as an outpatient with:   3. Post procedure teaching done including importance of medication adherence and access site care and monitoring.  4. DAPT: Aspirin 81mg po qd, Plavix 75mg po qd  5. Statin: Atorvastatin 40mg qhs  6. Beta Blocker: Toprol 25mg qd     Cardiac maintenance: Cardiac rehab  Medication changes: None   New medications: Toprol 25mg qd, Aspirin 81mg po qd, Plavix 75mg po qd, Atorvastatin 40mg qhs    Items to follow up out patient: Outpatient Cardiology followup SUMMARY  84 y/o F with PMHx of Meniere's disease, vertigo, s/p traumatic falls earlier this year, anxiety disorder and no prior cardiac history, who was brought to [facility] ED 10/16/23 with new onset of typical chest pain, found to have NSTEMI. Subsequent CCTA on 10/17/23 revealing to be CT-FFR positive for pLAD lesion, found to have intermediate coronary blockages with FFR results supporting need for cardiac catheterization. Pt is now s/p R + L heart catheterization on 10/20 with shockwave & DORITA x2 to pLAD, and medically stable to be discharged with outpatient Cardiology followup on 10/23.      Procedure: Left heart catheterization and PCI with DORITA x2 to pLAD    Discharge Diagnosis: NSTEMI with cardiac catheterization; s/p PCI with shockwave & DESx2 to pLAD  Indication for PCI: New onset typical angina  Admitted as inpatient secondary to chest pain and cardiac imaging indicating the need for cardiac catheterization    Problem List:   1. NSTEMI   No past medical history of any cardiac events or etiologies, and brought to [facility] ED 10/16/23 with new onset of typical chest pain. Found to have elevated troponins, peaked to 259 and began trending down, with admitting diagnosis of NSTEMI with subsequent CCTA 10/17/23 with CT-FFR positive for pLAD lesion. TTE on 10/16 showed EF preserved and no significant valvulopathies, clearing cardiac catheterization on 10/20, and was given ASA load, then continued with ASA 81mg qd and Atorvastatin 40mg QHS, with IVF hydration administered overnight in lieu of LHC. Catheterization revealed LHC - pLAD 95% and calcified; received scoreflex, shockwave & DORITA x2 to pLAD, RCA moderate disease, LCx mild disease. Hemodynamic parameters were: RA 3, RV 29/6, PA 26/9, PCWP 8, CO/CI (geraldine) 3.93/2.69, PA sat 73%, Ao sat 100%. She had successful removal of sutures and right groin access, with no subsequent complications or hematomas. Patient is stable for discharge.   Plan: Continue with Aspirin 81mg po qd, Plavix 75mg po qd, follow up in 1 week for reassessment.        Plan:   1. Discharge JENN 10/23/23  2. Follow up as an outpatient with:   3. Post procedure teaching done including importance of medication adherence and access site care and monitoring.  4. DAPT: Aspirin 81mg po qd, Plavix 75mg po qd  5. Statin: Atorvastatin 40mg qhs  6. Beta Blocker: Toprol 25mg qd     Cardiac maintenance: Cardiac rehab  Medication changes: None   New medications: Toprol 25mg qd, Aspirin 81mg po qd, Plavix 75mg po qd, Atorvastatin 40mg qhs    Items to follow up out patient: Outpatient Cardiology followup

## 2023-10-19 NOTE — DISCHARGE NOTE PROVIDER - NSDCMRMEDTOKEN_GEN_ALL_CORE_FT
buPROPion 300 mg/24 hours (XL) oral tablet, extended release: 1 tab(s) orally  sertraline 25 mg oral tablet: 1 tab(s) orally once a day  topiramate 25 mg oral tablet: 1 tab(s) orally once a day  triamterene-hydrochlorothiazide 37.5 mg-25 mg oral tablet: 1 tab(s) orally once a day   acetaminophen 325 mg oral tablet: 3 tab(s) orally every 12 hours As needed Mild Pain (1 - 3), Moderate Pain (4 - 6)  aspirin 81 mg oral delayed release tablet: 1 tab(s) orally once a day  atorvastatin 40 mg oral tablet: 1 tab(s) orally once a day (at bedtime)  buPROPion 300 mg/24 hours (XL) oral tablet, extended release: 1 tab(s) orally once a day  clopidogrel 75 mg oral tablet: 1 tab(s) orally once a day  metoprolol succinate 25 mg oral tablet, extended release: 1 tab(s) orally once a day  sertraline 25 mg oral tablet: 1 tab(s) orally once a day  topiramate 25 mg oral tablet: 1 tab(s) orally once a day  triamterene-hydrochlorothiazide 37.5 mg-25 mg oral tablet: 1 tab(s) orally once a day

## 2023-10-19 NOTE — DISCHARGE NOTE PROVIDER - DETAILS OF MALNUTRITION DIAGNOSIS/DIAGNOSES
This patient has been assessed with a concern for Malnutrition and was treated during this hospitalization for the following Nutrition diagnosis/diagnoses:     -  10/18/2023: Moderate protein-calorie malnutrition

## 2023-10-19 NOTE — PROGRESS NOTE ADULT - PROBLEM SELECTOR PLAN 2
Home med in record: wellbutrin 300mg qD, sertraline 25mg qD, topiramate 25mg qD    - c/w home med  - confirm Zoloft use as patient denied it as home med

## 2023-10-19 NOTE — DISCHARGE NOTE PROVIDER - NSDCCPCAREPLAN_GEN_ALL_CORE_FT
PRINCIPAL DISCHARGE DIAGNOSIS  Diagnosis: NSTEMI (non-ST elevation myocardial infarction)  Assessment and Plan of Treatment: A Non-ST-Elevation Myocardial Infarction is a type of heart attack, often referred to as NSTEMI or a non-STEMI. In medical terminology, a heart attack is a myocardial infarction. An NSTEMI is a less severe form of heart attack than the STEMI because it inflicts less damage to the heart. Your proximal left anterior ascending artery of your heart was 95% blocked requiring shockwave therapy and 2 stents to be placed. We will be adding a medication called Plavix 75mg daily tablet and Aspirin 81mg dailly to your daily meds. Please take as prescribed and follow up with your Cardiologist within one week of discharge.      SECONDARY DISCHARGE DIAGNOSES  Diagnosis: S/P coronary artery stent placement  Assessment and Plan of Treatment: You underwent a cardiac catheterization on 10/20/23 and the blockages in your coronary arteries were opened with Angioplasty/stent placement.    NEVER MISS A DOSE OF ASPIRIN and PLAVIX; IF YOU DO, YOU ARE AT RISK OF YOUR ANGIOPLASTY SITE and/or STENT CLOSING AND HAVING A HEART ATTACK. DO NOT STOP THESE TWO MEDICATIONS UNLESS INSTRUCTED TO DO SO BY YOUR CARDIOLOGIST. Your procedure was done through your wrist/groin.    You may shower but Avoid tub baths, hot tubs or swimming for 5 days to prevent infection.  You do not need to keep this area covered. You should wait 3 days before returning to ordinary activities. Do not drive for 2 days. Do not lift more than 5 pounds with affected arm for 3 days or more than 10 pounds if groin access for 5 days.  If you develop any swelling, bleeding, hardening of the skin (hematoma formation), acute pain, numbness/tingling  in your arm/groin please contact your doctor immediately or call our 24/7 line: [phone] or go to nearest Emergency Room. Please return to the hospital/seek immediate medical attention if you have worsening symptoms of chest pain, shortness of breath.   Please continue a heart healthy Plant based Mediterranean Diet low in sodium, cholesterol, and fat.  You have been given a Stent Card to carry with you in your wallet.  Make Photocopies or take a picture of card so you have a backup copy in event the Original card is lost.  This card has important information for any possible future Radiology/MRI studies.    We have provided you with a prescription for cardiac rehab which is medically supervised exercise program for your heart and has been shown to improve the quantity and quality of life of people with heart disease like yours. You should attend cardiac rehab 3 times per week for 12 weeks. We have provided you with a list of nearby facilities. Please call your insurance carrier to determine which of these facilities are covered under your plan. Please bring this prescription with you to your follo

## 2023-10-19 NOTE — OCCUPATIONAL THERAPY INITIAL EVALUATION ADULT - GENERAL OBSERVATIONS, REHAB EVAL
Pt's RN Rony aware of intent to tx; cleared Pt. Pt received in supine - +telemetry, heplock, purewick, o2 via NC 2L. Pt agreeable to OT. Advance C.O present.

## 2023-10-20 LAB
ALBUMIN SERPL ELPH-MCNC: 3.5 G/DL — SIGNIFICANT CHANGE UP (ref 3.3–5)
ALBUMIN SERPL ELPH-MCNC: 3.5 G/DL — SIGNIFICANT CHANGE UP (ref 3.3–5)
ALP SERPL-CCNC: 80 U/L — SIGNIFICANT CHANGE UP (ref 40–120)
ALP SERPL-CCNC: 80 U/L — SIGNIFICANT CHANGE UP (ref 40–120)
ALT FLD-CCNC: 45 U/L — SIGNIFICANT CHANGE UP (ref 10–45)
ALT FLD-CCNC: 45 U/L — SIGNIFICANT CHANGE UP (ref 10–45)
ANION GAP SERPL CALC-SCNC: 7 MMOL/L — SIGNIFICANT CHANGE UP (ref 5–17)
ANION GAP SERPL CALC-SCNC: 7 MMOL/L — SIGNIFICANT CHANGE UP (ref 5–17)
APTT BLD: 26.8 SEC — SIGNIFICANT CHANGE UP (ref 24.5–35.6)
APTT BLD: 26.8 SEC — SIGNIFICANT CHANGE UP (ref 24.5–35.6)
AST SERPL-CCNC: 28 U/L — SIGNIFICANT CHANGE UP (ref 10–40)
AST SERPL-CCNC: 28 U/L — SIGNIFICANT CHANGE UP (ref 10–40)
BILIRUB SERPL-MCNC: 0.4 MG/DL — SIGNIFICANT CHANGE UP (ref 0.2–1.2)
BILIRUB SERPL-MCNC: 0.4 MG/DL — SIGNIFICANT CHANGE UP (ref 0.2–1.2)
BLD GP AB SCN SERPL QL: NEGATIVE — SIGNIFICANT CHANGE UP
BLD GP AB SCN SERPL QL: NEGATIVE — SIGNIFICANT CHANGE UP
BUN SERPL-MCNC: 25 MG/DL — HIGH (ref 7–23)
BUN SERPL-MCNC: 25 MG/DL — HIGH (ref 7–23)
CALCIUM SERPL-MCNC: 8.8 MG/DL — SIGNIFICANT CHANGE UP (ref 8.4–10.5)
CALCIUM SERPL-MCNC: 8.8 MG/DL — SIGNIFICANT CHANGE UP (ref 8.4–10.5)
CHLORIDE SERPL-SCNC: 109 MMOL/L — HIGH (ref 96–108)
CHLORIDE SERPL-SCNC: 109 MMOL/L — HIGH (ref 96–108)
CO2 SERPL-SCNC: 23 MMOL/L — SIGNIFICANT CHANGE UP (ref 22–31)
CO2 SERPL-SCNC: 23 MMOL/L — SIGNIFICANT CHANGE UP (ref 22–31)
COHGB MFR BLDV: 0.9 % — SIGNIFICANT CHANGE UP
COHGB MFR BLDV: 0.9 % — SIGNIFICANT CHANGE UP
COHGB MFR BLDV: 1.2 % — SIGNIFICANT CHANGE UP
COHGB MFR BLDV: 1.2 % — SIGNIFICANT CHANGE UP
CREAT SERPL-MCNC: 0.63 MG/DL — SIGNIFICANT CHANGE UP (ref 0.5–1.3)
CREAT SERPL-MCNC: 0.63 MG/DL — SIGNIFICANT CHANGE UP (ref 0.5–1.3)
EGFR: 87 ML/MIN/1.73M2 — SIGNIFICANT CHANGE UP
EGFR: 87 ML/MIN/1.73M2 — SIGNIFICANT CHANGE UP
GLUCOSE SERPL-MCNC: 107 MG/DL — HIGH (ref 70–99)
GLUCOSE SERPL-MCNC: 107 MG/DL — HIGH (ref 70–99)
HCT VFR BLD CALC: 38.9 % — SIGNIFICANT CHANGE UP (ref 34.5–45)
HCT VFR BLD CALC: 38.9 % — SIGNIFICANT CHANGE UP (ref 34.5–45)
HGB BLD CALC-MCNC: 11.5 G/DL — LOW (ref 11.7–16.1)
HGB BLD CALC-MCNC: 11.5 G/DL — LOW (ref 11.7–16.1)
HGB BLD CALC-MCNC: 11.9 G/DL — SIGNIFICANT CHANGE UP (ref 11.7–16.1)
HGB BLD CALC-MCNC: 11.9 G/DL — SIGNIFICANT CHANGE UP (ref 11.7–16.1)
HGB BLD-MCNC: 12.6 G/DL — SIGNIFICANT CHANGE UP (ref 11.5–15.5)
HGB BLD-MCNC: 12.6 G/DL — SIGNIFICANT CHANGE UP (ref 11.5–15.5)
INR BLD: 0.92 — SIGNIFICANT CHANGE UP (ref 0.85–1.18)
INR BLD: 0.92 — SIGNIFICANT CHANGE UP (ref 0.85–1.18)
MAGNESIUM SERPL-MCNC: 1.9 MG/DL — SIGNIFICANT CHANGE UP (ref 1.6–2.6)
MAGNESIUM SERPL-MCNC: 1.9 MG/DL — SIGNIFICANT CHANGE UP (ref 1.6–2.6)
MCHC RBC-ENTMCNC: 31.2 PG — SIGNIFICANT CHANGE UP (ref 27–34)
MCHC RBC-ENTMCNC: 31.2 PG — SIGNIFICANT CHANGE UP (ref 27–34)
MCHC RBC-ENTMCNC: 32.4 GM/DL — SIGNIFICANT CHANGE UP (ref 32–36)
MCHC RBC-ENTMCNC: 32.4 GM/DL — SIGNIFICANT CHANGE UP (ref 32–36)
MCV RBC AUTO: 96.3 FL — SIGNIFICANT CHANGE UP (ref 80–100)
MCV RBC AUTO: 96.3 FL — SIGNIFICANT CHANGE UP (ref 80–100)
METHGB MFR BLDV: 0 % — SIGNIFICANT CHANGE UP
METHGB MFR BLDV: 0 % — SIGNIFICANT CHANGE UP
METHGB MFR BLDV: 0.5 % — SIGNIFICANT CHANGE UP
METHGB MFR BLDV: 0.5 % — SIGNIFICANT CHANGE UP
NRBC # BLD: 0 /100 WBCS — SIGNIFICANT CHANGE UP (ref 0–0)
NRBC # BLD: 0 /100 WBCS — SIGNIFICANT CHANGE UP (ref 0–0)
PHOSPHATE SERPL-MCNC: 2.8 MG/DL — SIGNIFICANT CHANGE UP (ref 2.5–4.5)
PHOSPHATE SERPL-MCNC: 2.8 MG/DL — SIGNIFICANT CHANGE UP (ref 2.5–4.5)
PLATELET # BLD AUTO: 213 K/UL — SIGNIFICANT CHANGE UP (ref 150–400)
PLATELET # BLD AUTO: 213 K/UL — SIGNIFICANT CHANGE UP (ref 150–400)
POTASSIUM SERPL-MCNC: 4.2 MMOL/L — SIGNIFICANT CHANGE UP (ref 3.5–5.3)
POTASSIUM SERPL-MCNC: 4.2 MMOL/L — SIGNIFICANT CHANGE UP (ref 3.5–5.3)
POTASSIUM SERPL-SCNC: 4.2 MMOL/L — SIGNIFICANT CHANGE UP (ref 3.5–5.3)
POTASSIUM SERPL-SCNC: 4.2 MMOL/L — SIGNIFICANT CHANGE UP (ref 3.5–5.3)
PROT SERPL-MCNC: 6 G/DL — SIGNIFICANT CHANGE UP (ref 6–8.3)
PROT SERPL-MCNC: 6 G/DL — SIGNIFICANT CHANGE UP (ref 6–8.3)
PROTHROM AB SERPL-ACNC: 10.5 SEC — SIGNIFICANT CHANGE UP (ref 9.5–13)
PROTHROM AB SERPL-ACNC: 10.5 SEC — SIGNIFICANT CHANGE UP (ref 9.5–13)
RBC # BLD: 4.04 M/UL — SIGNIFICANT CHANGE UP (ref 3.8–5.2)
RBC # BLD: 4.04 M/UL — SIGNIFICANT CHANGE UP (ref 3.8–5.2)
RBC # FLD: 13.2 % — SIGNIFICANT CHANGE UP (ref 10.3–14.5)
RBC # FLD: 13.2 % — SIGNIFICANT CHANGE UP (ref 10.3–14.5)
RH IG SCN BLD-IMP: NEGATIVE — SIGNIFICANT CHANGE UP
RH IG SCN BLD-IMP: NEGATIVE — SIGNIFICANT CHANGE UP
SAO2 % BLDV: 73 % — SIGNIFICANT CHANGE UP (ref 67–88)
SAO2 % BLDV: 73 % — SIGNIFICANT CHANGE UP (ref 67–88)
SAO2 % BLDV: 76 % — SIGNIFICANT CHANGE UP (ref 67–88)
SAO2 % BLDV: 76 % — SIGNIFICANT CHANGE UP (ref 67–88)
SODIUM SERPL-SCNC: 139 MMOL/L — SIGNIFICANT CHANGE UP (ref 135–145)
SODIUM SERPL-SCNC: 139 MMOL/L — SIGNIFICANT CHANGE UP (ref 135–145)
WBC # BLD: 5.96 K/UL — SIGNIFICANT CHANGE UP (ref 3.8–10.5)
WBC # BLD: 5.96 K/UL — SIGNIFICANT CHANGE UP (ref 3.8–10.5)
WBC # FLD AUTO: 5.96 K/UL — SIGNIFICANT CHANGE UP (ref 3.8–10.5)
WBC # FLD AUTO: 5.96 K/UL — SIGNIFICANT CHANGE UP (ref 3.8–10.5)

## 2023-10-20 PROCEDURE — 92978 ENDOLUMINL IVUS OCT C 1ST: CPT | Mod: 26,LD

## 2023-10-20 PROCEDURE — 0715T: CPT

## 2023-10-20 PROCEDURE — 99152 MOD SED SAME PHYS/QHP 5/>YRS: CPT

## 2023-10-20 PROCEDURE — 93460 R&L HRT ART/VENTRICLE ANGIO: CPT | Mod: 26,59

## 2023-10-20 PROCEDURE — 99233 SBSQ HOSP IP/OBS HIGH 50: CPT | Mod: 25

## 2023-10-20 PROCEDURE — 92928 PRQ TCAT PLMT NTRAC ST 1 LES: CPT | Mod: LD

## 2023-10-20 RX ORDER — ASPIRIN/CALCIUM CARB/MAGNESIUM 324 MG
81 TABLET ORAL DAILY
Refills: 0 | Status: DISCONTINUED | OUTPATIENT
Start: 2023-10-21 | End: 2023-10-23

## 2023-10-20 RX ORDER — IBUPROFEN 200 MG
600 TABLET ORAL EVERY 8 HOURS
Refills: 0 | Status: DISCONTINUED | OUTPATIENT
Start: 2023-10-20 | End: 2023-10-20

## 2023-10-20 RX ORDER — SODIUM CHLORIDE 9 MG/ML
1000 INJECTION INTRAMUSCULAR; INTRAVENOUS; SUBCUTANEOUS
Refills: 0 | Status: DISCONTINUED | OUTPATIENT
Start: 2023-10-20 | End: 2023-10-20

## 2023-10-20 RX ORDER — SODIUM CHLORIDE 9 MG/ML
500 INJECTION INTRAMUSCULAR; INTRAVENOUS; SUBCUTANEOUS
Refills: 0 | Status: DISCONTINUED | OUTPATIENT
Start: 2023-10-20 | End: 2023-10-22

## 2023-10-20 RX ORDER — CLOPIDOGREL BISULFATE 75 MG/1
75 TABLET, FILM COATED ORAL DAILY
Refills: 0 | Status: DISCONTINUED | OUTPATIENT
Start: 2023-10-21 | End: 2023-10-23

## 2023-10-20 RX ORDER — CLOPIDOGREL BISULFATE 75 MG/1
600 TABLET, FILM COATED ORAL ONCE
Refills: 0 | Status: COMPLETED | OUTPATIENT
Start: 2023-10-20 | End: 2023-10-20

## 2023-10-20 RX ADMIN — BUPROPION HYDROCHLORIDE 300 MILLIGRAM(S): 150 TABLET, EXTENDED RELEASE ORAL at 09:53

## 2023-10-20 RX ADMIN — Medication 25 MILLIGRAM(S): at 05:15

## 2023-10-20 RX ADMIN — Medication 650 MILLIGRAM(S): at 06:43

## 2023-10-20 RX ADMIN — Medication 25 MILLIGRAM(S): at 10:19

## 2023-10-20 RX ADMIN — Medication 600 MILLIGRAM(S): at 14:40

## 2023-10-20 RX ADMIN — SERTRALINE 25 MILLIGRAM(S): 25 TABLET, FILM COATED ORAL at 09:52

## 2023-10-20 RX ADMIN — ATORVASTATIN CALCIUM 40 MILLIGRAM(S): 80 TABLET, FILM COATED ORAL at 21:47

## 2023-10-20 RX ADMIN — Medication 81 MILLIGRAM(S): at 09:53

## 2023-10-20 RX ADMIN — LIDOCAINE 2 PATCH: 4 CREAM TOPICAL at 02:32

## 2023-10-20 RX ADMIN — Medication 600 MILLIGRAM(S): at 08:00

## 2023-10-20 RX ADMIN — Medication 650 MILLIGRAM(S): at 07:19

## 2023-10-20 RX ADMIN — SODIUM CHLORIDE 150 MILLILITER(S): 9 INJECTION INTRAMUSCULAR; INTRAVENOUS; SUBCUTANEOUS at 20:28

## 2023-10-20 RX ADMIN — Medication 600 MILLIGRAM(S): at 07:19

## 2023-10-20 RX ADMIN — CLOPIDOGREL BISULFATE 600 MILLIGRAM(S): 75 TABLET, FILM COATED ORAL at 09:52

## 2023-10-20 RX ADMIN — SODIUM CHLORIDE 75 MILLILITER(S): 9 INJECTION INTRAMUSCULAR; INTRAVENOUS; SUBCUTANEOUS at 12:00

## 2023-10-20 RX ADMIN — Medication 600 MILLIGRAM(S): at 14:19

## 2023-10-20 NOTE — PROGRESS NOTE ADULT - SUBJECTIVE AND OBJECTIVE BOX
CC: Patient is a 83y old  Female who presents with a chief complaint of chest discomfort since this am (20 Oct 2023 07:22)      INTERVAL EVENTS: ANTWAN    SUBJECTIVE / INTERVAL HPI: Patient seen and examined at bedside.     ROS: negative unless otherwise stated above.    VITAL SIGNS:  Vitals Interpretation review:  Vital Signs Last 24 Hrs  T(C): 36.9 (20 Oct 2023 03:51), Max: 37.1 (19 Oct 2023 13:39)  T(F): 98.5 (20 Oct 2023 03:51), Max: 98.7 (19 Oct 2023 13:39)  HR: 66 (20 Oct 2023 08:10) (63 - 73)  BP: 126/65 (20 Oct 2023 08:10) (111/55 - 149/64)  BP(mean): 90 (20 Oct 2023 08:10) (77 - 98)  RR: 18 (20 Oct 2023 08:10) (18 - 24)  SpO2: 98% (20 Oct 2023 08:10) (97% - 98%)    Parameters below as of 20 Oct 2023 08:10  Patient On (Oxygen Delivery Method): room air          10-19-23 @ 07:01  -  10-20-23 @ 07:00  --------------------------------------------------------  IN: 1076 mL / OUT: 800 mL / NET: 276 mL    10-20-23 @ 07:01  -  10-20-23 @ 10:22  --------------------------------------------------------  IN: 75 mL / OUT: 0 mL / NET: 75 mL        PHYSICAL EXAM:    General: NAD  HEENT: MMM  Neck: supple  Cardiovascular: +S1/S2; RRR  Respiratory: CTA B/L; no W/R/R  Gastrointestinal: soft, NT/ND  Extremities: WWP; no edema, clubbing or cyanosis  Vascular: 2+ radial, DP/PT pulses B/L  Neurological: AAOx3; no focal deficits    MEDICATIONS:  MEDICATIONS  (STANDING):  aspirin  chewable 81 milliGRAM(s) Oral every 24 hours  atorvastatin 40 milliGRAM(s) Oral at bedtime  buPROPion XL (24-Hour) . 300 milliGRAM(s) Oral daily  ibuprofen  Tablet. 600 milliGRAM(s) Oral every 8 hours  influenza  Vaccine (HIGH DOSE) 0.7 milliLiter(s) IntraMuscular once  metoprolol succinate ER 25 milliGRAM(s) Oral daily  sertraline 25 milliGRAM(s) Oral daily  topiramate 25 milliGRAM(s) Oral daily    MEDICATIONS  (PRN):  acetaminophen     Tablet .. 650 milliGRAM(s) Oral every 6 hours PRN Temp greater or equal to 38C (100.4F), Mild Pain (1 - 3)  LORazepam     Tablet 0.5 milliGRAM(s) Oral daily PRN Anxiety      ALLERGIES:  Allergies    codeine (Unknown)  penicillins (Unknown)    Intolerances        LABS:  Labs Interpretation:                         12.6   5.96  )-----------( 213      ( 20 Oct 2023 06:50 )             38.9     10-20    139  |  109<H>  |  25<H>  ----------------------------<  107<H>  4.2   |  23  |  0.63    Ca    8.8      20 Oct 2023 06:50  Phos  2.8     10-20  Mg     1.9     10-20    TPro  6.0  /  Alb  3.5  /  TBili  0.4  /  DBili  x   /  AST  28  /  ALT  45  /  AlkPhos  80  10-20    PT/INR - ( 20 Oct 2023 06:50 )   PT: 10.5 sec;   INR: 0.92          PTT - ( 20 Oct 2023 06:50 )  PTT:26.8 sec  Urinalysis Basic - ( 20 Oct 2023 06:50 )    Color: x / Appearance: x / SG: x / pH: x  Gluc: 107 mg/dL / Ketone: x  / Bili: x / Urobili: x   Blood: x / Protein: x / Nitrite: x   Leuk Esterase: x / RBC: x / WBC x   Sq Epi: x / Non Sq Epi: x / Bacteria: x      CAPILLARY BLOOD GLUCOSE                RADIOLOGY & ADDITIONAL TESTS: Reviewed.  CXR  EKG    Imaging Interpretation Review:    FMHx  Surgical Hx   CC: Patient is a 83y old  Female who presents with a chief complaint of chest discomfort (20 Oct 2023 07:22)    INTERVAL EVENTS: ANTWAN    SUBJECTIVE / INTERVAL HPI: Patient seen and examined at bedside.   No acute concerns or new complaints. Expresses she again understands risk vs benefits and wishes to proceed with cath.     ROS: negative unless otherwise stated above.    VITAL SIGNS:  Vitals Interpretation review:  Vital Signs Last 24 Hrs  T(C): 36.9 (20 Oct 2023 03:51), Max: 37.1 (19 Oct 2023 13:39)  T(F): 98.5 (20 Oct 2023 03:51), Max: 98.7 (19 Oct 2023 13:39)  HR: 66 (20 Oct 2023 08:10) (63 - 73)  BP: 126/65 (20 Oct 2023 08:10) (111/55 - 149/64)  BP(mean): 90 (20 Oct 2023 08:10) (77 - 98)  RR: 18 (20 Oct 2023 08:10) (18 - 24)  SpO2: 98% (20 Oct 2023 08:10) (97% - 98%)    Parameters below as of 20 Oct 2023 08:10  Patient On (Oxygen Delivery Method): room air          10-19-23 @ 07:01  -  10-20-23 @ 07:00  --------------------------------------------------------  IN: 1076 mL / OUT: 800 mL / NET: 276 mL    10-20-23 @ 07:01  -  10-20-23 @ 10:22  --------------------------------------------------------  IN: 75 mL / OUT: 0 mL / NET: 75 mL        PHYSICAL EXAM:  General: Mild distress due to pain. Frail and cachectic appearing.   HEENT: MMM  Neck: supple  Cardiovascular: +S1/S2; RRR. Pleuritic c/p.   Respiratory: CTA B/L; no W/R/R  Gastrointestinal: soft, NT/ND  Extremities: WWP; no edema, clubbing or cyanosis  Vascular: 2+ radial, DP/PT pulses B/L  Neurological: AAOx2, not to date, no focal deficits  Psych: Increased awareness from baseline. Able to be reoriented with teachback and once being made aware of time/place/situation. Linear thought process, content, with appropriate insight and judgement when alert.       MEDICATIONS:  MEDICATIONS  (STANDING):  aspirin  chewable 81 milliGRAM(s) Oral every 24 hours  atorvastatin 40 milliGRAM(s) Oral at bedtime  buPROPion XL (24-Hour) . 300 milliGRAM(s) Oral daily  ibuprofen  Tablet. 600 milliGRAM(s) Oral every 8 hours  influenza  Vaccine (HIGH DOSE) 0.7 milliLiter(s) IntraMuscular once  metoprolol succinate ER 25 milliGRAM(s) Oral daily  sertraline 25 milliGRAM(s) Oral daily  topiramate 25 milliGRAM(s) Oral daily    MEDICATIONS  (PRN):  acetaminophen     Tablet .. 650 milliGRAM(s) Oral every 6 hours PRN Temp greater or equal to 38C (100.4F), Mild Pain (1 - 3)  LORazepam     Tablet 0.5 milliGRAM(s) Oral daily PRN Anxiety      ALLERGIES:  Allergies    codeine (Unknown)  penicillins (Unknown)    Intolerances        LABS:  Labs Interpretation:                         12.6   5.96  )-----------( 213      ( 20 Oct 2023 06:50 )             38.9     10-20    139  |  109<H>  |  25<H>  ----------------------------<  107<H>  4.2   |  23  |  0.63    Ca    8.8      20 Oct 2023 06:50  Phos  2.8     10-20  Mg     1.9     10-20    TPro  6.0  /  Alb  3.5  /  TBili  0.4  /  DBili  x   /  AST  28  /  ALT  45  /  AlkPhos  80  10-20    PT/INR - ( 20 Oct 2023 06:50 )   PT: 10.5 sec;   INR: 0.92          PTT - ( 20 Oct 2023 06:50 )  PTT:26.8 sec  Urinalysis Basic - ( 20 Oct 2023 06:50 )    Color: x / Appearance: x / SG: x / pH: x  Gluc: 107 mg/dL / Ketone: x  / Bili: x / Urobili: x   Blood: x / Protein: x / Nitrite: x   Leuk Esterase: x / RBC: x / WBC x   Sq Epi: x / Non Sq Epi: x / Bacteria: x      CAPILLARY BLOOD GLUCOSE                RADIOLOGY & ADDITIONAL TESTS: Reviewed.  CXR  EKG    Imaging Interpretation Review:    FMHx  Surgical Hx

## 2023-10-20 NOTE — PROGRESS NOTE ADULT - PROBLEM SELECTOR PLAN 1
R/I for NSTEMI with trending up Troponin T --> 259 and now downtrended normal CRP, UA wnl, Lipid profile wnl.   TTE done at bedside - nl EF and no wall motion abnormalities, no effusion, no ventricular dysfx     CT cardiac angio:   1.  The calcium score is severe at 1104 Agatston units.  2.  Less than 50% stenosis of left main coronary artery.  3.  Calcific plaque obscures the lumen of proximal LAD, and mid RCA   precluding assessment of stenosis severity.  4.  Remaining segments demonstrate non-obstructive coronary artery   disease.  5. No PE, no effusions     - c/w atorvastatin 40mg po qd, toprol 25mg po qd, aspirin 81mg po qd  - avoid nitrates for possibility of RCA involvement   - CT cardiac angio rec FFr. -> FFR ctca vs cath, endorsing catheterization, planned for 10/20.   - Will hold lovenox till after cath, continue w/aspirin R/I for NSTEMI with trending up Troponin T --> 259 and now downtrended normal CRP, UA wnl, Lipid profile wnl.   TTE done at bedside - nl EF and no wall motion abnormalities, no effusion, no ventricular dysfx     CT cardiac angio:   1.  The calcium score is severe at 1104 Agatston units. 2.  Less than 50% stenosis of left main coronary artery. 3.  Calcific plaque obscures the lumen of proximal LAD, and mid RCA precluding assessment of stenosis severity. 4.  Remaining segments demonstrate non-obstructive coronary artery disease. 5.. No PE, no effusions     10/20 Cath: s/p R/LHC  - LHC - pLAD 95% and calcified; received scoreflex/shockwave/DORITA x2 to pLAD. RCA mod dz, LCx mild dz.  - RHC - RA 3, RV 29/6, PA 26/9, PCWP 8, CO/CI (geraldine) 3.93/2.69, PA sat 73%, Ao sat 100%  - Access LTR 10 pm, RFV w/ mattress suture (to be removed in AM)    - appreciate new interventional cards recs s/p cath   - c/w atorvastatin 40mg po qd, toprol 25mg po qd, aspirin 81mg po qd  - avoid nitrates for possibility of RCA involvement   - Will hold lovenox till after cath, continue w/aspirin R/I for NSTEMI with trending up Troponin T --> 259 and now downtrended normal CRP, UA wnl, Lipid profile wnl.   TTE done at bedside - nl EF and no wall motion abnormalities, no effusion, no ventricular dysfx     CT cardiac angio:   1.  The calcium score is severe at 1104 Agatston units. 2.  Less than 50% stenosis of left main coronary artery. 3.  Calcific plaque obscures the lumen of proximal LAD, and mid RCA precluding assessment of stenosis severity. 4.  Remaining segments demonstrate non-obstructive coronary artery disease. 5.. No PE, no effusions     10/20 Cath: s/p R/LHC  - LHC - pLAD 95% and calcified; received scoreflex/shockwave/DORITA x2 to pLAD. RCA mod dz, LCx mild dz.  - RHC - RA 3, RV 29/6, PA 26/9, PCWP 8, CO/CI (geraldine) 3.93/2.69, PA sat 73%, Ao sat 100%  - Access LTR 10 pm, RFV w/ mattress suture (to be removed in AM)    - appreciate new interventional cards recs s/p cath   - c/w atorvastatin 40mg po qd, toprol 25mg po qd, aspirin 81mg po qd  - will hold lovenox till after cath, continue w/ aspirin

## 2023-10-20 NOTE — CHART NOTE - NSCHARTNOTEFT_GEN_A_CORE
Pt is in the process of making decisions about her code status. MOLST form that is currently in the chart (incomplete) for DNR/DNI trial of NIV will be rescinded for 30 days post-cardiac catheterization for possible PCI. Pt is consented for PCI on 10/20 and awaiting the procedure; seen by interventional cardiology PA.

## 2023-10-20 NOTE — PROGRESS NOTE ADULT - SUBJECTIVE AND OBJECTIVE BOX
Interventional Cardiology PA Precath Note      HPI: 82 y/o F with PMH  Meniere's disease (getting worse as per patient), vertigo and  poor balance, s/p  traumatic falls earlier this year,  hx anxiety disorder and no prior cardiac hx, who was BIBA to [facility] ED 10/16/23 with new onset of atypical chest pain, reflecting potential unstable angina vs NSTEMI from typical angina, high Agatston score on Cardiac CT angio, found to have intermediate coronary blockages with FFR results supporting catheterization, scheduled for 10/20.     Anginal class:     Radiology:    TTE Echo Complete w/o Contrast w/ Doppler (10.16.23 @ 16:35) >   1. Normal left and right ventricular size and systolic function.   2. No significant valvular disease.   3. No evidence of pulmonary hypertension.   4. No pericardial effusion.    CT Angio Cardiac w/ IV Cont (10.17.23 @ 15:14) >  1.CT-FFR is negative:The flow is > 0.80  2.CT-FFR is positive. However, overall does not suggest a discrete   physiologically significant lesion: The flow is < or = to 0.80 in one or   more segments of one or more coronary arteries (perhaps becauseit is a   distal segment (vessel size is less than 2 mm)  3.CT-FFR is positive and clinically significant:The flow is < or = to   0.80, and the finding is clinically significant.        PMH:    PSH:    ALL: NKDA, NKFA. Denies shellfish/Contrast dye allergy.  SocHX: Denies EtoH/TOB/IVDU  FHx:   MEDS:   acetaminophen     Tablet .. 650 milliGRAM(s) Oral every 6 hours PRN  aspirin  chewable 81 milliGRAM(s) Oral every 24 hours  atorvastatin 40 milliGRAM(s) Oral at bedtime  buPROPion XL (24-Hour) . 300 milliGRAM(s) Oral daily  ibuprofen  Tablet. 600 milliGRAM(s) Oral every 8 hours  influenza  Vaccine (HIGH DOSE) 0.7 milliLiter(s) IntraMuscular once  LORazepam     Tablet 0.5 milliGRAM(s) Oral daily PRN  metoprolol succinate ER 25 milliGRAM(s) Oral daily  sertraline 25 milliGRAM(s) Oral daily  topiramate 25 milliGRAM(s) Oral daily    T(C): 36.9 (10-20-23 @ 03:51), Max: 37.1 (10-19-23 @ 13:39)  HR: 63 (10-20-23 @ 04:30) (63 - 73)  BP: 127/60 (10-20-23 @ 04:30) (111/55 - 149/64)  RR: 24 (10-20-23 @ 04:30) (18 - 24)  SpO2: 97% (10-20-23 @ 04:30) (97% - 98%)  Wt(kg): --      HEENT: NCAT, EOMI, PERRLA  NECK: No JVD, No carotid bruits B/L, +2 Carotid pulses B/L  PULM:  CTA B/L No W/R/R  CARD: RRR, +S1, +S2, No M/R/G  ABD: ND, +BS, NT, no masses  EXT: Warm, pedal edema yes/no, pitting/non-pitting  RECTAL: Guaiac negative/positive   NEURO: A & O x 3, no focal neurologic deficits  PULSES:	B	    R	      FEM         	                                            DP                          PT  Right		                                                  Yes/No     Bruit	    Left		                                                  Yes/No     Bruit                                12.6   5.96  )-----------( 213      ( 20 Oct 2023 06:50 )             38.9     10-19    141  |  104  |  13  ----------------------------<  95  3.4<L>   |  25  |  0.60    Ca    8.9      19 Oct 2023 05:30  Phos  2.5     10-19  Mg     1.8     10-19    TPro  6.2  /  Alb  3.5  /  TBili  0.4  /  DBili  x   /  AST  35  /  ALT  56<H>  /  AlkPhos  80  10-19      EKG:					  ASA _____				Mallampati class: _________	            Anginal Class: _________    A/P:   82 y/o F with PMH  Meniere's disease (getting worse as per patient), vertigo and  poor balance, s/p  traumatic falls earlier this year,  hx anxiety disorder and no prior cardiac hx, who was BIBA to [facility] ED 10/16/23 with new onset of atypical chest pain, reflecting potential unstable angina vs NSTEMI from typical angina, high Agatston score on Cardiac CT angio, found to have intermediate coronary blockages with FFR results supporting catheterization, scheduled for 10/20.       Sedation Plan:  Moderate   Patient Is Suitable Candidate For Sedation: Yes       Risks & benefits of procedure and sedation and risks and benefits for the alternative therapy have been explained to the patient in layman’s terms including but not limited to: allergic reaction, bleeding, infection, arrhythmia, respiratory compromise, renal and vascular compromise, limb damage, MI, CVA, emergent CABG/Vascular Surgery and death. Informed consent obtained and in chart. Interventional Cardiology PA Precath Note      HPI: 82 y/o F, (recently made DNI/DNR 10/20/23, rescinded for [facility] with possible intervention, scheduled for 10/20/23), no known history CAD, PMHx, Meniere's disease (getting worse as per patient), vertigo and  poor balance, s/p  traumatic falls earlier this year, hx anxiety disorder, h/o sundowning (currently on enhanced supervision), initially BIBA to [facility] ED 10/16/23 with new onset of atypical chest pain, found with Hs Trops , peaked @ 259, R/I NSTEMI with subsequent CCTA 10/17/23 with CT-FFR positive for pLAD lesion.   TTE 10/16: EF preserved, no significant valvulopathies    Pt seen at bedside today, examined, VSS, however still endorsing, 3-4/10 CP at rest, worsened with exertion, relieved with shallow breathing (also been treated for poss Pericarditis, on Motrin/tylenol).  Of note: pt was heparinized  (10/16-10/17), s/p ASA load and is now continued on ASA 81mg and Atorvastatin 40mg QHS. IVF hydration administered overnight in lieu of C.    Tele Monitor/Labs reviewed today (noted stable H/H, Plts Potassium, BUN/Cr, INR, all WNL), Plavix load 600mg PO loaded before procedure. Given pt's recent diagnosis of NSTEMI and current Class IV anginal symptoms, pt is now recommended for left heart catheterization with possible intervention.     Anginal class IV symptoms    Cardiac Diagnostic Imaging    TTE Echo Complete w/o Contrast w/ Doppler (10.16.23 @ 16:35) >   1. Normal left and right ventricular size and systolic function.   2. No significant valvular disease.   3. No evidence of pulmonary hypertension.   4. No pericardial effusion.    CT Angio Cardiac w/ IV Cont (10.17.23 @ 15:14) >  Left main: 0.99 (1)  LAD: 0.70 proximal (3  RI: 0.98 (1);  LCx: 0.96 (1)  RCA: 0.89 mid (1)    1.CT-FFR is negative:The flow is > 0.80  2.CT-FFR is positive. However, overall does not suggest a discrete   physiologically significant lesion: The flow is < or = to 0.80 in one or   more segments of one or more coronary arteries (perhaps becauseit is a   distal segment (vessel size is less than 2 mm)  3.CT-FFR is positive and clinically significant:The flow is < or = to   0.80, and the finding is clinically significant.      ALL: PCN/Codeine (rash)    MEDS:   acetaminophen     Tablet .. 650 milliGRAM(s) Oral every 6 hours PRN  aspirin  chewable 81 milliGRAM(s) Oral every 24 hours  atorvastatin 40 milliGRAM(s) Oral at bedtime  buPROPion XL (24-Hour) . 300 milliGRAM(s) Oral daily  ibuprofen  Tablet. 600 milliGRAM(s) Oral every 8 hours  influenza  Vaccine (HIGH DOSE) 0.7 milliLiter(s) IntraMuscular once  LORazepam     Tablet 0.5 milliGRAM(s) Oral daily PRN  metoprolol succinate ER 25 milliGRAM(s) Oral daily  sertraline 25 milliGRAM(s) Oral daily  topiramate 25 milliGRAM(s) Oral daily    T(C): 36.9 (10-20-23 @ 03:51), Max: 37.1 (10-19-23 @ 13:39)  HR: 63 (10-20-23 @ 04:30) (63 - 73)  BP: 127/60 (10-20-23 @ 04:30) (111/55 - 149/64)  RR: 24 (10-20-23 @ 04:30) (18 - 24)  SpO2: 97% (10-20-23 @ 04:30) (97% - 98%)  Wt(kg): --      HEENT: NCAT, EOMI, PERRLA  NECK: No JVD, No carotid bruits B/L, +2 Carotid pulses B/L  PULM:  CTA B/L No W/R/R  CARD: RRR, +S1, +S2, No M/R/G  ABD: ND, +BS, NT, no masses  EXT: Warm, no pitting edema bilaterally  NEURO: A & O x 3, no focal neurologic deficits    PULSES:	B	    R	      FEM         	             DP             PT  Right		   2+           1+, no Bruit	            1+              1+  Left		   2+           1+, no Bruit             1+              1+                            12.6   5.96  )-----------( 213      ( 20 Oct 2023 06:50 )             38.9     10-19    141  |  104  |  13  ----------------------------<  95  3.4<L>   |  25  |  0.60    Ca    8.9      19 Oct 2023 05:30  Phos  2.5     10-19  Mg     1.8     10-19    TPro  6.2  /  Alb  3.5  /  TBili  0.4  /  DBili  x   /  AST  35  /  ALT  56<H>  /  AlkPhos  80  10-19      A/P:  82 y/o F, (recently made DNI/DNR 10/20/23, rescinded for C with possible intervention, scheduled for 10/20/23), no known history CAD, PMHx, Meniere's disease (getting worse as per patient), vertigo and  poor balance, s/p  traumatic falls earlier this year, hx anxiety disorder, h/o sundowning (currently on enhanced supervision), initially BIBA to [facility] ED 10/16/23 with new onset of atypical chest pain, found with Hs Trops, peaked @ 259, R/I NSTEMI with subsequent CCTA 10/17/23 with CT-FFR positive for pLAD lesion.   TTE 10/16/23: EF preserved, no significant valvulopathies, now presented for recommended C with possible intervention with Dr Jolly.     OF NOTE;  CODE STATUS DISCUSSED INDEPTH WITH PATIENT AGREEABLE TO CATH PROCEDURE, DNI/DNR RESCINDED, TO BE REINSATED 30 DAYS POST CATH PROCEDURE, DOCUMENTATION IN CHART (SIGNED CONSENT IN FRONT OF CHART)    LOAD:  H/H (12.6/38.9).  Pt denies BRBPR, hematuria, hematochezia, melena. Pt given maintenance ASA 81mg and loaded with Plavix 600mg PO x 1     LABS:  Reviewed:  BUN/Cr: (13/0.6).  EF preserved.  Euvolemic on exam.  IV NS @ 75cc x 12 started overnight    ASA: III	         Mallampati class: II	            Anginal Class: IV    Sedation Plan:  Moderate    Patient Is Suitable Candidate For Sedation: Yes        Risks & benefits of procedure and sedation and risks and benefits for the alternative therapy have been explained to the patient in layman’s terms including but not limited to: allergic reaction, bleeding, infection, arrhythmia, respiratory compromise, renal and vascular compromise, limb damage, MI, CVA, emergent CABG/Vascular Surgery and death. Informed consent obtained and in chart.

## 2023-10-20 NOTE — PROGRESS NOTE ADULT - ASSESSMENT
82 y/o F with PMH  Meniere's disease (getting worse as per patient), vertigo and  poor balance, s/p  traumatic falls earlier this year,  hx anxiety disorder and no prior cardiac hx, who was BIBA to [facility] ED 10/16/23 with new onset of atypical chest pain, reflecting potential unstable angina vs NSTEMI from typical angina, high Agatston score on Cardiac CT angio, found to have intermediate coronary blockages with FFR results supporting catheterization, scheduled for 10/20.

## 2023-10-20 NOTE — PROGRESS NOTE ADULT - PROBLEM SELECTOR PLAN 3
F: s/p 975 ns over 13 hrs  E: replenish prn  N: DASH with Ensure protein+. NPO after midnight starting 10/20 midnight for cath  Pain regimen: Tylenol high dose prn, and motrin/lidocaine patch  Gi: none given  Dvt: enoxaparin 40mg sq q24h  Dispo: 5lach F: s/p 975 ns over 13 hrs & 1L NS prior to cath.   E: replenish prn  N: DASH with Ensure protein+. NPO after midnight starting 10/20 midnight for cath  Pain regimen: Tylenol high dose prn, and motrin/lidocaine patch  Gi: none given  Dvt: enoxaparin 40mg sq q24h  Dispo: 5lach

## 2023-10-21 DIAGNOSIS — M94.0 CHONDROCOSTAL JUNCTION SYNDROME [TIETZE]: ICD-10-CM

## 2023-10-21 LAB
ANION GAP SERPL CALC-SCNC: 7 MMOL/L — SIGNIFICANT CHANGE UP (ref 5–17)
ANION GAP SERPL CALC-SCNC: 7 MMOL/L — SIGNIFICANT CHANGE UP (ref 5–17)
BUN SERPL-MCNC: 24 MG/DL — HIGH (ref 7–23)
BUN SERPL-MCNC: 24 MG/DL — HIGH (ref 7–23)
CALCIUM SERPL-MCNC: 8.4 MG/DL — SIGNIFICANT CHANGE UP (ref 8.4–10.5)
CALCIUM SERPL-MCNC: 8.4 MG/DL — SIGNIFICANT CHANGE UP (ref 8.4–10.5)
CHLORIDE SERPL-SCNC: 110 MMOL/L — HIGH (ref 96–108)
CHLORIDE SERPL-SCNC: 110 MMOL/L — HIGH (ref 96–108)
CO2 SERPL-SCNC: 23 MMOL/L — SIGNIFICANT CHANGE UP (ref 22–31)
CO2 SERPL-SCNC: 23 MMOL/L — SIGNIFICANT CHANGE UP (ref 22–31)
CREAT SERPL-MCNC: 0.6 MG/DL — SIGNIFICANT CHANGE UP (ref 0.5–1.3)
CREAT SERPL-MCNC: 0.6 MG/DL — SIGNIFICANT CHANGE UP (ref 0.5–1.3)
EGFR: 88 ML/MIN/1.73M2 — SIGNIFICANT CHANGE UP
EGFR: 88 ML/MIN/1.73M2 — SIGNIFICANT CHANGE UP
GLUCOSE SERPL-MCNC: 111 MG/DL — HIGH (ref 70–99)
GLUCOSE SERPL-MCNC: 111 MG/DL — HIGH (ref 70–99)
HCT VFR BLD CALC: 33.8 % — LOW (ref 34.5–45)
HCT VFR BLD CALC: 33.8 % — LOW (ref 34.5–45)
HGB BLD-MCNC: 11 G/DL — LOW (ref 11.5–15.5)
HGB BLD-MCNC: 11 G/DL — LOW (ref 11.5–15.5)
MAGNESIUM SERPL-MCNC: 1.8 MG/DL — SIGNIFICANT CHANGE UP (ref 1.6–2.6)
MAGNESIUM SERPL-MCNC: 1.8 MG/DL — SIGNIFICANT CHANGE UP (ref 1.6–2.6)
MCHC RBC-ENTMCNC: 31.1 PG — SIGNIFICANT CHANGE UP (ref 27–34)
MCHC RBC-ENTMCNC: 31.1 PG — SIGNIFICANT CHANGE UP (ref 27–34)
MCHC RBC-ENTMCNC: 32.5 GM/DL — SIGNIFICANT CHANGE UP (ref 32–36)
MCHC RBC-ENTMCNC: 32.5 GM/DL — SIGNIFICANT CHANGE UP (ref 32–36)
MCV RBC AUTO: 95.5 FL — SIGNIFICANT CHANGE UP (ref 80–100)
MCV RBC AUTO: 95.5 FL — SIGNIFICANT CHANGE UP (ref 80–100)
NRBC # BLD: 0 /100 WBCS — SIGNIFICANT CHANGE UP (ref 0–0)
NRBC # BLD: 0 /100 WBCS — SIGNIFICANT CHANGE UP (ref 0–0)
PHOSPHATE SERPL-MCNC: 2.9 MG/DL — SIGNIFICANT CHANGE UP (ref 2.5–4.5)
PHOSPHATE SERPL-MCNC: 2.9 MG/DL — SIGNIFICANT CHANGE UP (ref 2.5–4.5)
PLATELET # BLD AUTO: 201 K/UL — SIGNIFICANT CHANGE UP (ref 150–400)
PLATELET # BLD AUTO: 201 K/UL — SIGNIFICANT CHANGE UP (ref 150–400)
POTASSIUM SERPL-MCNC: 3.7 MMOL/L — SIGNIFICANT CHANGE UP (ref 3.5–5.3)
POTASSIUM SERPL-MCNC: 3.7 MMOL/L — SIGNIFICANT CHANGE UP (ref 3.5–5.3)
POTASSIUM SERPL-SCNC: 3.7 MMOL/L — SIGNIFICANT CHANGE UP (ref 3.5–5.3)
POTASSIUM SERPL-SCNC: 3.7 MMOL/L — SIGNIFICANT CHANGE UP (ref 3.5–5.3)
RBC # BLD: 3.54 M/UL — LOW (ref 3.8–5.2)
RBC # BLD: 3.54 M/UL — LOW (ref 3.8–5.2)
RBC # FLD: 13.2 % — SIGNIFICANT CHANGE UP (ref 10.3–14.5)
RBC # FLD: 13.2 % — SIGNIFICANT CHANGE UP (ref 10.3–14.5)
SODIUM SERPL-SCNC: 140 MMOL/L — SIGNIFICANT CHANGE UP (ref 135–145)
SODIUM SERPL-SCNC: 140 MMOL/L — SIGNIFICANT CHANGE UP (ref 135–145)
WBC # BLD: 6.72 K/UL — SIGNIFICANT CHANGE UP (ref 3.8–10.5)
WBC # BLD: 6.72 K/UL — SIGNIFICANT CHANGE UP (ref 3.8–10.5)
WBC # FLD AUTO: 6.72 K/UL — SIGNIFICANT CHANGE UP (ref 3.8–10.5)
WBC # FLD AUTO: 6.72 K/UL — SIGNIFICANT CHANGE UP (ref 3.8–10.5)

## 2023-10-21 PROCEDURE — 99233 SBSQ HOSP IP/OBS HIGH 50: CPT

## 2023-10-21 RX ORDER — MAGNESIUM SULFATE 500 MG/ML
1 VIAL (ML) INJECTION ONCE
Refills: 0 | Status: COMPLETED | OUTPATIENT
Start: 2023-10-21 | End: 2023-10-21

## 2023-10-21 RX ORDER — AMLODIPINE BESYLATE 2.5 MG/1
5 TABLET ORAL ONCE
Refills: 0 | Status: COMPLETED | OUTPATIENT
Start: 2023-10-21 | End: 2023-10-21

## 2023-10-21 RX ORDER — ACETAMINOPHEN 500 MG
975 TABLET ORAL EVERY 12 HOURS
Refills: 0 | Status: DISCONTINUED | OUTPATIENT
Start: 2023-10-21 | End: 2023-10-23

## 2023-10-21 RX ORDER — LIDOCAINE 4 G/100G
2 CREAM TOPICAL ONCE
Refills: 0 | Status: COMPLETED | OUTPATIENT
Start: 2023-10-21 | End: 2023-10-21

## 2023-10-21 RX ORDER — POTASSIUM CHLORIDE 20 MEQ
20 PACKET (EA) ORAL ONCE
Refills: 0 | Status: COMPLETED | OUTPATIENT
Start: 2023-10-21 | End: 2023-10-21

## 2023-10-21 RX ADMIN — CLOPIDOGREL BISULFATE 75 MILLIGRAM(S): 75 TABLET, FILM COATED ORAL at 12:12

## 2023-10-21 RX ADMIN — LIDOCAINE 2 PATCH: 4 CREAM TOPICAL at 12:09

## 2023-10-21 RX ADMIN — Medication 975 MILLIGRAM(S): at 14:11

## 2023-10-21 RX ADMIN — ATORVASTATIN CALCIUM 40 MILLIGRAM(S): 80 TABLET, FILM COATED ORAL at 21:09

## 2023-10-21 RX ADMIN — BUPROPION HYDROCHLORIDE 300 MILLIGRAM(S): 150 TABLET, EXTENDED RELEASE ORAL at 12:08

## 2023-10-21 RX ADMIN — Medication 20 MILLIEQUIVALENT(S): at 16:34

## 2023-10-21 RX ADMIN — Medication 650 MILLIGRAM(S): at 09:06

## 2023-10-21 RX ADMIN — SERTRALINE 25 MILLIGRAM(S): 25 TABLET, FILM COATED ORAL at 12:09

## 2023-10-21 RX ADMIN — AMLODIPINE BESYLATE 5 MILLIGRAM(S): 2.5 TABLET ORAL at 12:08

## 2023-10-21 RX ADMIN — Medication 650 MILLIGRAM(S): at 09:46

## 2023-10-21 RX ADMIN — Medication 100 GRAM(S): at 16:35

## 2023-10-21 RX ADMIN — Medication 975 MILLIGRAM(S): at 13:41

## 2023-10-21 RX ADMIN — LIDOCAINE 2 PATCH: 4 CREAM TOPICAL at 19:48

## 2023-10-21 RX ADMIN — Medication 25 MILLIGRAM(S): at 12:08

## 2023-10-21 RX ADMIN — Medication 81 MILLIGRAM(S): at 12:25

## 2023-10-21 RX ADMIN — Medication 25 MILLIGRAM(S): at 06:41

## 2023-10-21 NOTE — PROGRESS NOTE ADULT - SUBJECTIVE AND OBJECTIVE BOX
CC: Patient is a 83y old  Female who presents with a chief complaint of chest discomfort since this am (20 Oct 2023 10:21)      INTERVAL EVENTS: ANTWAN    SUBJECTIVE / INTERVAL HPI: Patient seen and examined at bedside.     ROS: negative unless otherwise stated above.    VITAL SIGNS:  Vitals Interpretation review:  Vital Signs Last 24 Hrs  T(C): 36.7 (21 Oct 2023 14:21), Max: 36.7 (21 Oct 2023 14:21)  T(F): 98.1 (21 Oct 2023 14:21), Max: 98.1 (21 Oct 2023 14:21)  HR: 70 (21 Oct 2023 16:00) (68 - 96)  BP: 148/73 (21 Oct 2023 16:00) (125/71 - 160/83)  BP(mean): 105 (21 Oct 2023 16:00) (85 - 115)  RR: 18 (21 Oct 2023 16:00) (18 - 20)  SpO2: 97% (21 Oct 2023 16:00) (96% - 99%)    Parameters below as of 21 Oct 2023 16:00  Patient On (Oxygen Delivery Method): room air          10-20-23 @ 07:01  -  10-21-23 @ 07:00  --------------------------------------------------------  IN: 955 mL / OUT: 650 mL / NET: 305 mL    10-21-23 @ 07:01  -  10-21-23 @ 16:18  --------------------------------------------------------  IN: 492 mL / OUT: 800 mL / NET: -308 mL        PHYSICAL EXAM:    General: NAD  HEENT: MMM  Neck: supple  Cardiovascular: +S1/S2; RRR  Respiratory: CTA B/L; no W/R/R  Gastrointestinal: soft, NT/ND  Extremities: WWP; no edema, clubbing or cyanosis  Vascular: 2+ radial, DP/PT pulses B/L  Neurological: AAOx3; no focal deficits    MEDICATIONS:  MEDICATIONS  (STANDING):  aspirin enteric coated 81 milliGRAM(s) Oral daily  atorvastatin 40 milliGRAM(s) Oral at bedtime  buPROPion XL (24-Hour) . 300 milliGRAM(s) Oral daily  clopidogrel Tablet 75 milliGRAM(s) Oral daily  influenza  Vaccine (HIGH DOSE) 0.7 milliLiter(s) IntraMuscular once  metoprolol succinate ER 25 milliGRAM(s) Oral daily  sertraline 25 milliGRAM(s) Oral daily  sodium chloride 0.9%. 500 milliLiter(s) (150 mL/Hr) IV Continuous <Continuous>  topiramate 25 milliGRAM(s) Oral daily    MEDICATIONS  (PRN):  acetaminophen     Tablet .. 975 milliGRAM(s) Oral every 12 hours PRN Mild Pain (1 - 3), Moderate Pain (4 - 6)  LORazepam     Tablet 0.5 milliGRAM(s) Oral daily PRN Anxiety      ALLERGIES:  Allergies    codeine (Unknown)  penicillins (Unknown)    Intolerances        LABS:  Labs Interpretation:                         11.0   6.72  )-----------( 201      ( 21 Oct 2023 05:30 )             33.8     10-21    140  |  110<H>  |  24<H>  ----------------------------<  111<H>  3.7   |  23  |  0.60    Ca    8.4      21 Oct 2023 05:30  Phos  2.9     10-21  Mg     1.8     10-21    TPro  6.0  /  Alb  3.5  /  TBili  0.4  /  DBili  x   /  AST  28  /  ALT  45  /  AlkPhos  80  10-20    PT/INR - ( 20 Oct 2023 06:50 )   PT: 10.5 sec;   INR: 0.92          PTT - ( 20 Oct 2023 06:50 )  PTT:26.8 sec  Urinalysis Basic - ( 21 Oct 2023 05:30 )    Color: x / Appearance: x / SG: x / pH: x  Gluc: 111 mg/dL / Ketone: x  / Bili: x / Urobili: x   Blood: x / Protein: x / Nitrite: x   Leuk Esterase: x / RBC: x / WBC x   Sq Epi: x / Non Sq Epi: x / Bacteria: x      CAPILLARY BLOOD GLUCOSE                RADIOLOGY & ADDITIONAL TESTS: Reviewed.  CXR  EKG    Imaging Interpretation Review:    FMHx  Surgical Hx   CC: Patient is a 83y old  Female who presents with a chief complaint of chest discomfort since this am (20 Oct 2023 10:21)      INTERVAL EVENTS: ANTWAN    SUBJECTIVE / INTERVAL HPI: Patient seen and examined at bedside.     ROS: negative unless otherwise stated above.    VITAL SIGNS:  Vitals Interpretation review:  Vital Signs Last 24 Hrs  T(C): 36.7 (21 Oct 2023 14:21), Max: 36.7 (21 Oct 2023 14:21)  T(F): 98.1 (21 Oct 2023 14:21), Max: 98.1 (21 Oct 2023 14:21)  HR: 70 (21 Oct 2023 16:00) (68 - 96)  BP: 148/73 (21 Oct 2023 16:00) (125/71 - 160/83)  BP(mean): 105 (21 Oct 2023 16:00) (85 - 115)  RR: 18 (21 Oct 2023 16:00) (18 - 20)  SpO2: 97% (21 Oct 2023 16:00) (96% - 99%)    Parameters below as of 21 Oct 2023 16:00  Patient On (Oxygen Delivery Method): room air          10-20-23 @ 07:01  -  10-21-23 @ 07:00  --------------------------------------------------------  IN: 955 mL / OUT: 650 mL / NET: 305 mL    10-21-23 @ 07:01  -  10-21-23 @ 16:18  --------------------------------------------------------  IN: 492 mL / OUT: 800 mL / NET: -308 mL        PHYSICAL EXAM:  General: No significant distress, more alert and upbeat than before.   HEENT: MMM  Neck: supple  Cardiovascular: +S1/S2; RRR.   Respiratory: CTA B/L; no W/R/R  Gastrointestinal: soft, NT/ND  Extremities: WWP; no edema, clubbing or cyanosis  Vascular: 2+ radial, DP/PT pulses B/L  Neurological: AAOx3, able to report date today, no focal deficits  Psych: Significant improvement in previous waxing and waning attention span/orientation. Worsens with sedatives and CNS depressants. Able to be reoriented with teachback and once being made aware of time/place/situation. Linear thought process, content, with appropriate insight and judgement when alert.     MEDICATIONS:  MEDICATIONS  (STANDING):  aspirin enteric coated 81 milliGRAM(s) Oral daily  atorvastatin 40 milliGRAM(s) Oral at bedtime  buPROPion XL (24-Hour) . 300 milliGRAM(s) Oral daily  clopidogrel Tablet 75 milliGRAM(s) Oral daily  influenza  Vaccine (HIGH DOSE) 0.7 milliLiter(s) IntraMuscular once  metoprolol succinate ER 25 milliGRAM(s) Oral daily  sertraline 25 milliGRAM(s) Oral daily  sodium chloride 0.9%. 500 milliLiter(s) (150 mL/Hr) IV Continuous <Continuous>  topiramate 25 milliGRAM(s) Oral daily    MEDICATIONS  (PRN):  acetaminophen     Tablet .. 975 milliGRAM(s) Oral every 12 hours PRN Mild Pain (1 - 3), Moderate Pain (4 - 6)  LORazepam     Tablet 0.5 milliGRAM(s) Oral daily PRN Anxiety      ALLERGIES:  Allergies    codeine (Unknown)  penicillins (Unknown)    Intolerances        LABS:  Labs Interpretation:                         11.0   6.72  )-----------( 201      ( 21 Oct 2023 05:30 )             33.8     10-21    140  |  110<H>  |  24<H>  ----------------------------<  111<H>  3.7   |  23  |  0.60    Ca    8.4      21 Oct 2023 05:30  Phos  2.9     10-21  Mg     1.8     10-21    TPro  6.0  /  Alb  3.5  /  TBili  0.4  /  DBili  x   /  AST  28  /  ALT  45  /  AlkPhos  80  10-20    PT/INR - ( 20 Oct 2023 06:50 )   PT: 10.5 sec;   INR: 0.92          PTT - ( 20 Oct 2023 06:50 )  PTT:26.8 sec  Urinalysis Basic - ( 21 Oct 2023 05:30 )    Color: x / Appearance: x / SG: x / pH: x  Gluc: 111 mg/dL / Ketone: x  / Bili: x / Urobili: x   Blood: x / Protein: x / Nitrite: x   Leuk Esterase: x / RBC: x / WBC x   Sq Epi: x / Non Sq Epi: x / Bacteria: x      RADIOLOGY & ADDITIONAL TESTS: Reviewed.    TELE ANTWAN

## 2023-10-21 NOTE — PROGRESS NOTE ADULT - PROBLEM SELECTOR PLAN 1
R/I for NSTEMI with trending up Troponin T --> 259 and now downtrended normal CRP, UA wnl, Lipid profile wnl.   TTE done at bedside - nl EF and no wall motion abnormalities, no effusion, no ventricular dysfx     CT cardiac angio:   1.  The calcium score is severe at 1104 Agatston units. 2.  Less than 50% stenosis of left main coronary artery. 3.  Calcific plaque obscures the lumen of proximal LAD, and mid RCA precluding assessment of stenosis severity. 4.  Remaining segments demonstrate non-obstructive coronary artery disease. 5.. No PE, no effusions     10/20 Cath: s/p R/LHC  - LHC - pLAD 95% and calcified; received scoreflex/shockwave/DORITA x2 to pLAD. RCA mod dz, LCx mild dz.  - RHC - RA 3, RV 29/6, PA 26/9, PCWP 8, CO/CI (geraldine) 3.93/2.69, PA sat 73%, Ao sat 100%  - Access LTR 10 pm, RFV w/ mattress suture (to be removed in AM)    - appreciate new interventional cards recs s/p cath   - c/w atorvastatin 40mg po qd, toprol 25mg po qd, aspirin 81mg po qd  - avoid nitrates for possibility of RCA involvement   - Will hold lovenox till after cath, continue w/aspirin R/I for NSTEMI with trending up Troponin T --> 259 and now downtrended normal CRP, UA wnl, Lipid profile wnl.   TTE done at bedside - nl EF and no wall motion abnormalities, no effusion, no ventricular dysfx     CT cardiac angio:   1.  The calcium score is severe at 1104 Agatston units. 2.  Less than 50% stenosis of left main coronary artery. 3.  Calcific plaque obscures the lumen of proximal LAD, and mid RCA precluding assessment of stenosis severity. 4.  Remaining segments demonstrate non-obstructive coronary artery disease. 5.. No PE, no effusions     Prior to cath: LOAD: H/H (12.6/38.9).  Pt denies BRBPR, hematuria, hematochezia, melena. Pt given maintenance ASA 81mg and loaded with Plavix 600mg PO x 1       10/20 Cath: s/p R/LHC  - LHC - pLAD 95% and calcified; received scoreflex/shockwave/DORITA x2 to pLAD. RCA mod dz, LCx mild dz.  - RHC - RA 3, RV 29/6, PA 26/9, PCWP 8, CO/CI (geraldine) 3.93/2.69, PA sat 73%, Ao sat 100%  - LTR and mattress suture removal, no hematomas.     - appreciate new interventional cards recs s/p cath: C/w DAPT + tele monitoring   - c/w atorvastatin 40mg po qd, toprol 25mg po qd, aspirin 81mg po qd  - avoid nitrates for possibility of RCA involvement   - Will hold lovenox till after cath, continue w/aspirin

## 2023-10-21 NOTE — PROGRESS NOTE ADULT - PROBLEM SELECTOR PLAN 3
F: s/p 975 ns over 13 hrs & 1L NS prior to cath.   E: replenish prn  N: DASH with Ensure protein+. NPO after midnight starting 10/20 midnight for cath  Pain regimen: Tylenol high dose prn, and motrin/lidocaine patch  Gi: none given  Dvt: enoxaparin 40mg sq q24h  Dispo: 5lach Home med in record: wellbutrin 300mg qD, sertraline 25mg qD, topiramate 25mg qD    - c/w home med  - confirm Zoloft use as patient denied it as home med

## 2023-10-21 NOTE — PROGRESS NOTE ADULT - PROBLEM SELECTOR PLAN 2
Home med in record: wellbutrin 300mg qD, sertraline 25mg qD, topiramate 25mg qD    - c/w home med  - confirm Zoloft use as patient denied it as home med Patchy distribution of chest wall pain, that is reproducible on palpation, nonexertional and nonradiating.  Improves on Motrin/Tylenol.     -Tylenol post cath, and lidocaine patch to sites

## 2023-10-21 NOTE — PROGRESS NOTE ADULT - PROBLEM SELECTOR PLAN 4
F: s/p 975 ns over 13 hrs & 1L NS prior to cath.   E: replenish prn  N: DASH with Ensure protein+.   Pain regimen: Tylenol high dose prn, and motrin/lidocaine patch  Gi: none given  Dvt: enoxaparin 40mg sq q24h  Dispo: 5lach

## 2023-10-21 NOTE — PROGRESS NOTE ADULT - ASSESSMENT
82 y/o F with PMH  Meniere's disease (getting worse as per patient), vertigo and  poor balance, s/p  traumatic falls earlier this year,  hx anxiety disorder and no prior cardiac hx, who was BIBA to [facility] ED 10/16/23 with new onset of atypical chest pain, reflecting potential unstable angina vs NSTEMI from typical angina, high Agatston score on Cardiac CT angio, found to have intermediate coronary blockages with FFR results supporting catheterization, scheduled for 10/20.    84 y/o F with PMH  Meniere's disease (getting worse as per patient), vertigo and  poor balance, s/p  traumatic falls earlier this year,  hx anxiety disorder and no prior cardiac hx, who was BIBA to [facility] ED 10/16/23 with new onset of atypical chest pain, reflecting potential unstable angina vs NSTEMI from typical angina, high Agatston score on Cardiac CT angio, found to have intermediate coronary blockages with FFR results supporting catheterization, now s/p R/L heart cath 10/20 ([facility] - pLAD 95% and calcified; received scoreflex/shockwave/DORITA x2 to [facility]. RCA mod dz, LCx mild dz, RHC - RA 3, RV 29/6, PA 26/9, PCWP 8, CO/CI (geraldine) 3.93/2.69, PA sat 73%, Ao sat 100%)

## 2023-10-22 LAB
ALBUMIN SERPL ELPH-MCNC: 3.5 G/DL — SIGNIFICANT CHANGE UP (ref 3.3–5)
ALBUMIN SERPL ELPH-MCNC: 3.5 G/DL — SIGNIFICANT CHANGE UP (ref 3.3–5)
ALP SERPL-CCNC: 85 U/L — SIGNIFICANT CHANGE UP (ref 40–120)
ALP SERPL-CCNC: 85 U/L — SIGNIFICANT CHANGE UP (ref 40–120)
ALT FLD-CCNC: 45 U/L — SIGNIFICANT CHANGE UP (ref 10–45)
ALT FLD-CCNC: 45 U/L — SIGNIFICANT CHANGE UP (ref 10–45)
ANION GAP SERPL CALC-SCNC: 8 MMOL/L — SIGNIFICANT CHANGE UP (ref 5–17)
ANION GAP SERPL CALC-SCNC: 8 MMOL/L — SIGNIFICANT CHANGE UP (ref 5–17)
AST SERPL-CCNC: 44 U/L — HIGH (ref 10–40)
AST SERPL-CCNC: 44 U/L — HIGH (ref 10–40)
BASOPHILS # BLD AUTO: 0.02 K/UL — SIGNIFICANT CHANGE UP (ref 0–0.2)
BASOPHILS # BLD AUTO: 0.02 K/UL — SIGNIFICANT CHANGE UP (ref 0–0.2)
BASOPHILS NFR BLD AUTO: 0.3 % — SIGNIFICANT CHANGE UP (ref 0–2)
BASOPHILS NFR BLD AUTO: 0.3 % — SIGNIFICANT CHANGE UP (ref 0–2)
BILIRUB SERPL-MCNC: 0.3 MG/DL — SIGNIFICANT CHANGE UP (ref 0.2–1.2)
BILIRUB SERPL-MCNC: 0.3 MG/DL — SIGNIFICANT CHANGE UP (ref 0.2–1.2)
BUN SERPL-MCNC: 17 MG/DL — SIGNIFICANT CHANGE UP (ref 7–23)
BUN SERPL-MCNC: 17 MG/DL — SIGNIFICANT CHANGE UP (ref 7–23)
CALCIUM SERPL-MCNC: 8.7 MG/DL — SIGNIFICANT CHANGE UP (ref 8.4–10.5)
CALCIUM SERPL-MCNC: 8.7 MG/DL — SIGNIFICANT CHANGE UP (ref 8.4–10.5)
CHLORIDE SERPL-SCNC: 105 MMOL/L — SIGNIFICANT CHANGE UP (ref 96–108)
CHLORIDE SERPL-SCNC: 105 MMOL/L — SIGNIFICANT CHANGE UP (ref 96–108)
CO2 SERPL-SCNC: 24 MMOL/L — SIGNIFICANT CHANGE UP (ref 22–31)
CO2 SERPL-SCNC: 24 MMOL/L — SIGNIFICANT CHANGE UP (ref 22–31)
CREAT SERPL-MCNC: 0.6 MG/DL — SIGNIFICANT CHANGE UP (ref 0.5–1.3)
CREAT SERPL-MCNC: 0.6 MG/DL — SIGNIFICANT CHANGE UP (ref 0.5–1.3)
EGFR: 88 ML/MIN/1.73M2 — SIGNIFICANT CHANGE UP
EGFR: 88 ML/MIN/1.73M2 — SIGNIFICANT CHANGE UP
EOSINOPHIL # BLD AUTO: 0.15 K/UL — SIGNIFICANT CHANGE UP (ref 0–0.5)
EOSINOPHIL # BLD AUTO: 0.15 K/UL — SIGNIFICANT CHANGE UP (ref 0–0.5)
EOSINOPHIL NFR BLD AUTO: 2.4 % — SIGNIFICANT CHANGE UP (ref 0–6)
EOSINOPHIL NFR BLD AUTO: 2.4 % — SIGNIFICANT CHANGE UP (ref 0–6)
GLUCOSE SERPL-MCNC: 103 MG/DL — HIGH (ref 70–99)
GLUCOSE SERPL-MCNC: 103 MG/DL — HIGH (ref 70–99)
HCT VFR BLD CALC: 33.8 % — LOW (ref 34.5–45)
HCT VFR BLD CALC: 33.8 % — LOW (ref 34.5–45)
HGB BLD-MCNC: 11.2 G/DL — LOW (ref 11.5–15.5)
HGB BLD-MCNC: 11.2 G/DL — LOW (ref 11.5–15.5)
IMM GRANULOCYTES NFR BLD AUTO: 0.6 % — SIGNIFICANT CHANGE UP (ref 0–0.9)
IMM GRANULOCYTES NFR BLD AUTO: 0.6 % — SIGNIFICANT CHANGE UP (ref 0–0.9)
LYMPHOCYTES # BLD AUTO: 1.15 K/UL — SIGNIFICANT CHANGE UP (ref 1–3.3)
LYMPHOCYTES # BLD AUTO: 1.15 K/UL — SIGNIFICANT CHANGE UP (ref 1–3.3)
LYMPHOCYTES # BLD AUTO: 18.3 % — SIGNIFICANT CHANGE UP (ref 13–44)
LYMPHOCYTES # BLD AUTO: 18.3 % — SIGNIFICANT CHANGE UP (ref 13–44)
MAGNESIUM SERPL-MCNC: 1.9 MG/DL — SIGNIFICANT CHANGE UP (ref 1.6–2.6)
MAGNESIUM SERPL-MCNC: 1.9 MG/DL — SIGNIFICANT CHANGE UP (ref 1.6–2.6)
MCHC RBC-ENTMCNC: 31.3 PG — SIGNIFICANT CHANGE UP (ref 27–34)
MCHC RBC-ENTMCNC: 31.3 PG — SIGNIFICANT CHANGE UP (ref 27–34)
MCHC RBC-ENTMCNC: 33.1 GM/DL — SIGNIFICANT CHANGE UP (ref 32–36)
MCHC RBC-ENTMCNC: 33.1 GM/DL — SIGNIFICANT CHANGE UP (ref 32–36)
MCV RBC AUTO: 94.4 FL — SIGNIFICANT CHANGE UP (ref 80–100)
MCV RBC AUTO: 94.4 FL — SIGNIFICANT CHANGE UP (ref 80–100)
MONOCYTES # BLD AUTO: 0.76 K/UL — SIGNIFICANT CHANGE UP (ref 0–0.9)
MONOCYTES # BLD AUTO: 0.76 K/UL — SIGNIFICANT CHANGE UP (ref 0–0.9)
MONOCYTES NFR BLD AUTO: 12.1 % — SIGNIFICANT CHANGE UP (ref 2–14)
MONOCYTES NFR BLD AUTO: 12.1 % — SIGNIFICANT CHANGE UP (ref 2–14)
NEUTROPHILS # BLD AUTO: 4.18 K/UL — SIGNIFICANT CHANGE UP (ref 1.8–7.4)
NEUTROPHILS # BLD AUTO: 4.18 K/UL — SIGNIFICANT CHANGE UP (ref 1.8–7.4)
NEUTROPHILS NFR BLD AUTO: 66.3 % — SIGNIFICANT CHANGE UP (ref 43–77)
NEUTROPHILS NFR BLD AUTO: 66.3 % — SIGNIFICANT CHANGE UP (ref 43–77)
NRBC # BLD: 0 /100 WBCS — SIGNIFICANT CHANGE UP (ref 0–0)
NRBC # BLD: 0 /100 WBCS — SIGNIFICANT CHANGE UP (ref 0–0)
PHOSPHATE SERPL-MCNC: 2.5 MG/DL — SIGNIFICANT CHANGE UP (ref 2.5–4.5)
PHOSPHATE SERPL-MCNC: 2.5 MG/DL — SIGNIFICANT CHANGE UP (ref 2.5–4.5)
PLATELET # BLD AUTO: 200 K/UL — SIGNIFICANT CHANGE UP (ref 150–400)
PLATELET # BLD AUTO: 200 K/UL — SIGNIFICANT CHANGE UP (ref 150–400)
POTASSIUM SERPL-MCNC: 3.9 MMOL/L — SIGNIFICANT CHANGE UP (ref 3.5–5.3)
POTASSIUM SERPL-MCNC: 3.9 MMOL/L — SIGNIFICANT CHANGE UP (ref 3.5–5.3)
POTASSIUM SERPL-SCNC: 3.9 MMOL/L — SIGNIFICANT CHANGE UP (ref 3.5–5.3)
POTASSIUM SERPL-SCNC: 3.9 MMOL/L — SIGNIFICANT CHANGE UP (ref 3.5–5.3)
PROT SERPL-MCNC: 5.8 G/DL — LOW (ref 6–8.3)
PROT SERPL-MCNC: 5.8 G/DL — LOW (ref 6–8.3)
RBC # BLD: 3.58 M/UL — LOW (ref 3.8–5.2)
RBC # BLD: 3.58 M/UL — LOW (ref 3.8–5.2)
RBC # FLD: 13.1 % — SIGNIFICANT CHANGE UP (ref 10.3–14.5)
RBC # FLD: 13.1 % — SIGNIFICANT CHANGE UP (ref 10.3–14.5)
SODIUM SERPL-SCNC: 137 MMOL/L — SIGNIFICANT CHANGE UP (ref 135–145)
SODIUM SERPL-SCNC: 137 MMOL/L — SIGNIFICANT CHANGE UP (ref 135–145)
WBC # BLD: 6.3 K/UL — SIGNIFICANT CHANGE UP (ref 3.8–10.5)
WBC # BLD: 6.3 K/UL — SIGNIFICANT CHANGE UP (ref 3.8–10.5)
WBC # FLD AUTO: 6.3 K/UL — SIGNIFICANT CHANGE UP (ref 3.8–10.5)
WBC # FLD AUTO: 6.3 K/UL — SIGNIFICANT CHANGE UP (ref 3.8–10.5)

## 2023-10-22 PROCEDURE — 99233 SBSQ HOSP IP/OBS HIGH 50: CPT

## 2023-10-22 RX ORDER — MAGNESIUM SULFATE 500 MG/ML
1 VIAL (ML) INJECTION ONCE
Refills: 0 | Status: COMPLETED | OUTPATIENT
Start: 2023-10-22 | End: 2023-10-22

## 2023-10-22 RX ORDER — LIDOCAINE 4 G/100G
1 CREAM TOPICAL ONCE
Refills: 0 | Status: COMPLETED | OUTPATIENT
Start: 2023-10-22 | End: 2023-10-22

## 2023-10-22 RX ORDER — ENOXAPARIN SODIUM 100 MG/ML
40 INJECTION SUBCUTANEOUS EVERY 24 HOURS
Refills: 0 | Status: DISCONTINUED | OUTPATIENT
Start: 2023-10-22 | End: 2023-10-22

## 2023-10-22 RX ORDER — ENOXAPARIN SODIUM 100 MG/ML
30 INJECTION SUBCUTANEOUS EVERY 24 HOURS
Refills: 0 | Status: DISCONTINUED | OUTPATIENT
Start: 2023-10-22 | End: 2023-10-23

## 2023-10-22 RX ORDER — POTASSIUM CHLORIDE 20 MEQ
20 PACKET (EA) ORAL ONCE
Refills: 0 | Status: COMPLETED | OUTPATIENT
Start: 2023-10-22 | End: 2023-10-22

## 2023-10-22 RX ADMIN — Medication 100 GRAM(S): at 13:01

## 2023-10-22 RX ADMIN — ENOXAPARIN SODIUM 30 MILLIGRAM(S): 100 INJECTION SUBCUTANEOUS at 21:18

## 2023-10-22 RX ADMIN — LIDOCAINE 2 PATCH: 4 CREAM TOPICAL at 00:02

## 2023-10-22 RX ADMIN — Medication 81 MILLIGRAM(S): at 13:00

## 2023-10-22 RX ADMIN — Medication 975 MILLIGRAM(S): at 06:00

## 2023-10-22 RX ADMIN — Medication 975 MILLIGRAM(S): at 17:30

## 2023-10-22 RX ADMIN — Medication 975 MILLIGRAM(S): at 06:58

## 2023-10-22 RX ADMIN — Medication 25 MILLIGRAM(S): at 13:01

## 2023-10-22 RX ADMIN — Medication 20 MILLIEQUIVALENT(S): at 12:59

## 2023-10-22 RX ADMIN — SERTRALINE 25 MILLIGRAM(S): 25 TABLET, FILM COATED ORAL at 13:00

## 2023-10-22 RX ADMIN — Medication 25 MILLIGRAM(S): at 05:54

## 2023-10-22 RX ADMIN — CLOPIDOGREL BISULFATE 75 MILLIGRAM(S): 75 TABLET, FILM COATED ORAL at 13:00

## 2023-10-22 RX ADMIN — LIDOCAINE 1 PATCH: 4 CREAM TOPICAL at 14:49

## 2023-10-22 RX ADMIN — LIDOCAINE 1 PATCH: 4 CREAM TOPICAL at 20:41

## 2023-10-22 RX ADMIN — BUPROPION HYDROCHLORIDE 300 MILLIGRAM(S): 150 TABLET, EXTENDED RELEASE ORAL at 13:01

## 2023-10-22 RX ADMIN — ATORVASTATIN CALCIUM 40 MILLIGRAM(S): 80 TABLET, FILM COATED ORAL at 21:18

## 2023-10-22 NOTE — PROGRESS NOTE ADULT - SUBJECTIVE AND OBJECTIVE BOX
OVERNIGHT EVENTS:    SUBJECTIVE / INTERVAL HPI: Patient seen and examined at bedside.     VITAL SIGNS:  Vital Signs Last 24 Hrs  T(C): 36.8 (22 Oct 2023 04:57), Max: 37.3 (21 Oct 2023 16:46)  T(F): 98.2 (22 Oct 2023 04:57), Max: 99.1 (21 Oct 2023 16:46)  HR: 68 (22 Oct 2023 05:29) (68 - 92)  BP: 136/63 (22 Oct 2023 05:29) (125/82 - 148/73)  BP(mean): 91 (22 Oct 2023 05:29) (87 - 105)  RR: 18 (22 Oct 2023 05:29) (18 - 20)  SpO2: 98% (22 Oct 2023 05:29) (97% - 100%)    Parameters below as of 22 Oct 2023 05:29  Patient On (Oxygen Delivery Method): room air        PHYSICAL EXAM:    General: Well developed, well nourished, no acute distress  HEENT: NC/AT; PERRL, anicteric sclera; MMM  Neck: supple  Cardiovascular: +S1/S2, RRR, no murmurs, rubs, gallops  Respiratory: CTA B/L; no W/R/R  Gastrointestinal: soft, NT/ND; +BSx4  Extremities: WWP; no edema, clubbing or cyanosis  Vascular: 2+ radial, DP/PT pulses B/L  Neurological: AAOx3; no focal deficits    MEDICATIONS:  MEDICATIONS  (STANDING):  aspirin enteric coated 81 milliGRAM(s) Oral daily  atorvastatin 40 milliGRAM(s) Oral at bedtime  buPROPion XL (24-Hour) . 300 milliGRAM(s) Oral daily  clopidogrel Tablet 75 milliGRAM(s) Oral daily  influenza  Vaccine (HIGH DOSE) 0.7 milliLiter(s) IntraMuscular once  metoprolol succinate ER 25 milliGRAM(s) Oral daily  sertraline 25 milliGRAM(s) Oral daily  sodium chloride 0.9%. 500 milliLiter(s) (150 mL/Hr) IV Continuous <Continuous>  topiramate 25 milliGRAM(s) Oral daily    MEDICATIONS  (PRN):  acetaminophen     Tablet .. 975 milliGRAM(s) Oral every 12 hours PRN Mild Pain (1 - 3), Moderate Pain (4 - 6)  LORazepam     Tablet 0.5 milliGRAM(s) Oral daily PRN Anxiety      ALLERGIES:  Allergies    codeine (Unknown)  penicillins (Unknown)    Intolerances        LABS:                        11.2   6.30  )-----------( 200      ( 22 Oct 2023 05:30 )             33.8     10-21    140  |  110<H>  |  24<H>  ----------------------------<  111<H>  3.7   |  23  |  0.60    Ca    8.4      21 Oct 2023 05:30  Phos  2.9     10-21  Mg     1.8     10-21        Urinalysis Basic - ( 21 Oct 2023 05:30 )    Color: x / Appearance: x / SG: x / pH: x  Gluc: 111 mg/dL / Ketone: x  / Bili: x / Urobili: x   Blood: x / Protein: x / Nitrite: x   Leuk Esterase: x / RBC: x / WBC x   Sq Epi: x / Non Sq Epi: x / Bacteria: x      CAPILLARY BLOOD GLUCOSE          RADIOLOGY & ADDITIONAL TESTS: Reviewed. OVERNIGHT EVENTS: No overnight event.    SUBJECTIVE / INTERVAL HPI: Patient seen and examined at bedside. Still with shoulder/arm sorness/pain, about the same as yesterday. Some improvement with tylenol/lidocaine. Otherwise feels well    VITAL SIGNS:  Vital Signs Last 24 Hrs  T(C): 36.8 (22 Oct 2023 04:57), Max: 37.3 (21 Oct 2023 16:46)  T(F): 98.2 (22 Oct 2023 04:57), Max: 99.1 (21 Oct 2023 16:46)  HR: 68 (22 Oct 2023 05:29) (68 - 92)  BP: 136/63 (22 Oct 2023 05:29) (125/82 - 148/73)  BP(mean): 91 (22 Oct 2023 05:29) (87 - 105)  RR: 18 (22 Oct 2023 05:29) (18 - 20)  SpO2: 98% (22 Oct 2023 05:29) (97% - 100%)    Parameters below as of 22 Oct 2023 05:29  Patient On (Oxygen Delivery Method): room air        PHYSICAL EXAM:    General: Well developed, well nourished, no acute distress  HEENT: NC/AT; PERRL, anicteric sclera; MMM  Neck: supple  Cardiovascular: +S1/S2, RRR, no murmurs, rubs, gallops  Respiratory: CTA B/L; no W/R/R  Gastrointestinal: soft, NT/ND; +BSx4  Extremities: WWP; no edema, clubbing or cyanosis  MSK: reproducible tenderness on palpation of latearal ribs, worse on right axillary region  Vascular: 2+ radial, DP/PT pulses B/L, right groin soft and non-tender  Neurological: AAOx3; no focal deficits    MEDICATIONS:  MEDICATIONS  (STANDING):  aspirin enteric coated 81 milliGRAM(s) Oral daily  atorvastatin 40 milliGRAM(s) Oral at bedtime  buPROPion XL (24-Hour) . 300 milliGRAM(s) Oral daily  clopidogrel Tablet 75 milliGRAM(s) Oral daily  influenza  Vaccine (HIGH DOSE) 0.7 milliLiter(s) IntraMuscular once  metoprolol succinate ER 25 milliGRAM(s) Oral daily  sertraline 25 milliGRAM(s) Oral daily  sodium chloride 0.9%. 500 milliLiter(s) (150 mL/Hr) IV Continuous <Continuous>  topiramate 25 milliGRAM(s) Oral daily    MEDICATIONS  (PRN):  acetaminophen     Tablet .. 975 milliGRAM(s) Oral every 12 hours PRN Mild Pain (1 - 3), Moderate Pain (4 - 6)  LORazepam     Tablet 0.5 milliGRAM(s) Oral daily PRN Anxiety      ALLERGIES:  Allergies    codeine (Unknown)  penicillins (Unknown)    Intolerances        LABS:                        11.2   6.30  )-----------( 200      ( 22 Oct 2023 05:30 )             33.8     10-21    140  |  110<H>  |  24<H>  ----------------------------<  111<H>  3.7   |  23  |  0.60    Ca    8.4      21 Oct 2023 05:30  Phos  2.9     10-21  Mg     1.8     10-21        Urinalysis Basic - ( 21 Oct 2023 05:30 )    Color: x / Appearance: x / SG: x / pH: x  Gluc: 111 mg/dL / Ketone: x  / Bili: x / Urobili: x   Blood: x / Protein: x / Nitrite: x   Leuk Esterase: x / RBC: x / WBC x   Sq Epi: x / Non Sq Epi: x / Bacteria: x      CAPILLARY BLOOD GLUCOSE          RADIOLOGY & ADDITIONAL TESTS: Reviewed.

## 2023-10-22 NOTE — PROGRESS NOTE ADULT - NUTRITIONAL ASSESSMENT
This patient has been assessed with a concern for Malnutrition and has been determined to have a diagnosis/diagnoses of Moderate protein-calorie malnutrition.    This patient is being managed with:   Diet DASH/TLC-  Sodium & Cholesterol Restricted  Entered: Oct 19 2023 10:39AM    Diet NPO after Midnight-     NPO Start Date: 19-Oct-2023   NPO Start Time: 23:59  Except Medications  Entered: Oct 19 2023 10:33AM  
This patient has been assessed with a concern for Malnutrition and has been determined to have a diagnosis/diagnoses of Moderate protein-calorie malnutrition.    This patient is being managed with:   Diet DASH/TLC-  Sodium & Cholesterol Restricted  Entered: Oct 19 2023 10:39AM  
This patient has been assessed with a concern for Malnutrition and has been determined to have a diagnosis/diagnoses of Moderate protein-calorie malnutrition.    This patient is being managed with:   Diet DASH/TLC-  Sodium & Cholesterol Restricted  Entered: Oct 19 2023 10:39AM  
This patient has been assessed with a concern for Malnutrition and has been determined to have a diagnosis/diagnoses of Moderate protein-calorie malnutrition.    This patient is being managed with:   Diet DASH/TLC-  Sodium & Cholesterol Restricted  Entered: Oct 19 2023 10:39AM    Diet NPO after Midnight-     NPO Start Date: 19-Oct-2023   NPO Start Time: 23:59  Except Medications  Entered: Oct 19 2023 10:33AM  
This patient has been assessed with a concern for Malnutrition and has been determined to have a diagnosis/diagnoses of Moderate protein-calorie malnutrition.    This patient is being managed with:   Diet DASH/TLC-  Sodium & Cholesterol Restricted  Entered: Oct 19 2023 10:39AM    Diet NPO after Midnight-     NPO Start Date: 19-Oct-2023   NPO Start Time: 23:59  Except Medications  Entered: Oct 19 2023 10:33AM

## 2023-10-22 NOTE — PROGRESS NOTE ADULT - PROBLEM SELECTOR PLAN 1
R/I for NSTEMI with trending up Troponin T --> 259 and now downtrended normal CRP, UA wnl, Lipid profile wnl.   TTE done at bedside - nl EF and no wall motion abnormalities, no effusion, no ventricular dysfx     CT cardiac angio:   1.  The calcium score is severe at 1104 Agatston units. 2.  Less than 50% stenosis of left main coronary artery. 3.  Calcific plaque obscures the lumen of proximal LAD, and mid RCA precluding assessment of stenosis severity. 4.  Remaining segments demonstrate non-obstructive coronary artery disease. 5.. No PE, no effusions     Prior to cath: LOAD: H/H (12.6/38.9).  Pt denies BRBPR, hematuria, hematochezia, melena. Pt given maintenance ASA 81mg and loaded with Plavix 600mg PO x 1       10/20 Cath: s/p R/LHC  - LHC - pLAD 95% and calcified; received scoreflex/shockwave/DORITA x2 to pLAD. RCA mod dz, LCx mild dz.  - RHC - RA 3, RV 29/6, PA 26/9, PCWP 8, CO/CI (geraldine) 3.93/2.69, PA sat 73%, Ao sat 100%  - LTR and mattress suture removal, no hematomas.     - appreciate new interventional cards recs s/p cath: C/w DAPT + tele monitoring   - c/w atorvastatin 40mg po qd, toprol 25mg po qd, aspirin 81mg po qd  - avoid nitrates for possibility of RCA involvement   - continue w/aspirin and lovenox

## 2023-10-22 NOTE — PROGRESS NOTE ADULT - PROBLEM SELECTOR PLAN 2
Patchy distribution of chest wall pain, that is reproducible on palpation, nonexertional and nonradiating.  Improves on Motrin/Tylenol.     -Tylenol post cath, and lidocaine patch to sites

## 2023-10-22 NOTE — PROGRESS NOTE ADULT - ASSESSMENT
84 y/o F with PMH  Meniere's disease (getting worse as per patient), vertigo and  poor balance, s/p  traumatic falls earlier this year,  hx anxiety disorder and no prior cardiac hx, who was BIBA to [facility] ED 10/16/23 with new onset of atypical chest pain, reflecting potential unstable angina vs NSTEMI from typical angina, high Agatston score on Cardiac CT angio, found to have intermediate coronary blockages with FFR results supporting catheterization, now s/p R/L heart cath 10/20 ([facility] - pLAD 95% and calcified; received scoreflex/shockwave/DORITA x2 to [facility]. RCA mod dz, LCx mild dz, RHC - RA 3, RV 29/6, PA 26/9, PCWP 8, CO/CI (geraldine) 3.93/2.69, PA sat 73%, Ao sat 100%) now pending [facility].

## 2023-10-22 NOTE — PROGRESS NOTE ADULT - PROBLEM SELECTOR PLAN 4
F: None  E: replenish prn  N: DASH with Ensure protein+.   Pain regimen: Tylenol high dose prn, and motrin/lidocaine patch  Gi: none given  Dvt: enoxaparin 40mg sq q24h  Dispo: 5lach F: None  E: replenish prn  N: DASH with Ensure protein+.   Pain regimen: Tylenol high dose prn, and lidocaine patch  Gi: none given  Dvt: enoxaparin 40mg sq q24h  Dispo: 5lach F: None  E: replenish prn  N: DASH with Ensure protein+.   Pain regimen: Tylenol high dose prn, and lidocaine patch  Gi: none given  Dvt: enoxaparin 30mg sq q24h  Dispo: 5lach

## 2023-10-23 ENCOUNTER — TRANSCRIPTION ENCOUNTER (OUTPATIENT)
Age: 83
End: 2023-10-23

## 2023-10-23 VITALS — TEMPERATURE: 98 F

## 2023-10-23 DIAGNOSIS — Z95.5 PRESENCE OF CORONARY ANGIOPLASTY IMPLANT AND GRAFT: ICD-10-CM

## 2023-10-23 PROBLEM — R42 DIZZINESS AND GIDDINESS: Chronic | Status: ACTIVE | Noted: 2023-10-16

## 2023-10-23 PROBLEM — F41.9 ANXIETY DISORDER, UNSPECIFIED: Chronic | Status: ACTIVE | Noted: 2023-10-16

## 2023-10-23 PROBLEM — K29.60 OTHER GASTRITIS WITHOUT BLEEDING: Chronic | Status: ACTIVE | Noted: 2023-10-16

## 2023-10-23 LAB
SARS-COV-2 RNA SPEC QL NAA+PROBE: SIGNIFICANT CHANGE UP
SARS-COV-2 RNA SPEC QL NAA+PROBE: SIGNIFICANT CHANGE UP

## 2023-10-23 PROCEDURE — 99239 HOSP IP/OBS DSCHRG MGMT >30: CPT

## 2023-10-23 RX ORDER — ACETAMINOPHEN 500 MG
3 TABLET ORAL
Qty: 0 | Refills: 0 | DISCHARGE
Start: 2023-10-23

## 2023-10-23 RX ORDER — ASPIRIN/CALCIUM CARB/MAGNESIUM 324 MG
1 TABLET ORAL
Qty: 0 | Refills: 0 | DISCHARGE
Start: 2023-10-23

## 2023-10-23 RX ORDER — ATORVASTATIN CALCIUM 80 MG/1
1 TABLET, FILM COATED ORAL
Qty: 0 | Refills: 0 | DISCHARGE
Start: 2023-10-23

## 2023-10-23 RX ORDER — METOPROLOL TARTRATE 50 MG
1 TABLET ORAL
Qty: 0 | Refills: 0 | DISCHARGE
Start: 2023-10-23

## 2023-10-23 RX ORDER — CLOPIDOGREL BISULFATE 75 MG/1
1 TABLET, FILM COATED ORAL
Qty: 0 | Refills: 0 | DISCHARGE
Start: 2023-10-23

## 2023-10-23 RX ADMIN — Medication 25 MILLIGRAM(S): at 05:03

## 2023-10-23 RX ADMIN — Medication 81 MILLIGRAM(S): at 11:58

## 2023-10-23 RX ADMIN — Medication 975 MILLIGRAM(S): at 05:33

## 2023-10-23 RX ADMIN — LIDOCAINE 1 PATCH: 4 CREAM TOPICAL at 02:30

## 2023-10-23 RX ADMIN — SERTRALINE 25 MILLIGRAM(S): 25 TABLET, FILM COATED ORAL at 11:59

## 2023-10-23 RX ADMIN — Medication 25 MILLIGRAM(S): at 11:59

## 2023-10-23 RX ADMIN — CLOPIDOGREL BISULFATE 75 MILLIGRAM(S): 75 TABLET, FILM COATED ORAL at 11:59

## 2023-10-23 RX ADMIN — BUPROPION HYDROCHLORIDE 300 MILLIGRAM(S): 150 TABLET, EXTENDED RELEASE ORAL at 11:59

## 2023-10-23 RX ADMIN — Medication 975 MILLIGRAM(S): at 06:33

## 2023-10-23 NOTE — DISCHARGE NOTE NURSING/CASE MANAGEMENT/SOCIAL WORK - NSDPFAC_GEN_ALL_CORE
[facility], [address] [place] [address], [place] [address] [facility], [address], [place] [address]

## 2023-10-23 NOTE — DISCHARGE NOTE NURSING/CASE MANAGEMENT/SOCIAL WORK - PATIENT PORTAL LINK FT
You can access the FollowMyHealth Patient Portal offered by [facility] by registering at the following website: [email or web address]. By joining [facility]’s FollowMyHealth portal, you will also be able to view your health information using other applications (apps) compatible with our system.

## 2023-10-24 ENCOUNTER — TRANSCRIPTION ENCOUNTER (OUTPATIENT)
Age: 83
End: 2023-10-24

## 2023-10-26 ENCOUNTER — EMERGENCY (EMERGENCY)
Facility: HOSPITAL | Age: 83
LOS: 1 days | Discharge: NURSING, REHAB OR LONG-TERM CARE | End: 2023-10-26
Attending: STUDENT IN AN ORGANIZED HEALTH CARE EDUCATION/TRAINING PROGRAM | Admitting: HOSPITALIST
Payer: COMMERCIAL

## 2023-10-26 VITALS
HEART RATE: 94 BPM | WEIGHT: 102.96 LBS | DIASTOLIC BLOOD PRESSURE: 71 MMHG | OXYGEN SATURATION: 97 % | RESPIRATION RATE: 18 BRPM | SYSTOLIC BLOOD PRESSURE: 131 MMHG | TEMPERATURE: 98 F | HEIGHT: 63 IN

## 2023-10-26 DIAGNOSIS — I10 ESSENTIAL (PRIMARY) HYPERTENSION: ICD-10-CM

## 2023-10-26 DIAGNOSIS — F41.9 ANXIETY DISORDER, UNSPECIFIED: ICD-10-CM

## 2023-10-26 DIAGNOSIS — Z88.0 ALLERGY STATUS TO PENICILLIN: ICD-10-CM

## 2023-10-26 DIAGNOSIS — I25.2 OLD MYOCARDIAL INFARCTION: ICD-10-CM

## 2023-10-26 DIAGNOSIS — Z98.890 OTHER SPECIFIED POSTPROCEDURAL STATES: Chronic | ICD-10-CM

## 2023-10-26 DIAGNOSIS — K59.00 CONSTIPATION, UNSPECIFIED: ICD-10-CM

## 2023-10-26 DIAGNOSIS — R11.2 NAUSEA WITH VOMITING, UNSPECIFIED: ICD-10-CM

## 2023-10-26 DIAGNOSIS — Z79.82 LONG TERM (CURRENT) USE OF ASPIRIN: ICD-10-CM

## 2023-10-26 DIAGNOSIS — M79.89 OTHER SPECIFIED SOFT TISSUE DISORDERS: ICD-10-CM

## 2023-10-26 DIAGNOSIS — Z87.81 PERSONAL HISTORY OF (HEALED) TRAUMATIC FRACTURE: Chronic | ICD-10-CM

## 2023-10-26 DIAGNOSIS — Z95.5 PRESENCE OF CORONARY ANGIOPLASTY IMPLANT AND GRAFT: ICD-10-CM

## 2023-10-26 DIAGNOSIS — I25.10 ATHEROSCLEROTIC HEART DISEASE OF NATIVE CORONARY ARTERY WITHOUT ANGINA PECTORIS: ICD-10-CM

## 2023-10-26 LAB
ANION GAP SERPL CALC-SCNC: 12 MMOL/L — SIGNIFICANT CHANGE UP (ref 5–17)
ANION GAP SERPL CALC-SCNC: 12 MMOL/L — SIGNIFICANT CHANGE UP (ref 5–17)
APTT BLD: 27.7 SEC — SIGNIFICANT CHANGE UP (ref 24.5–35.6)
APTT BLD: 27.7 SEC — SIGNIFICANT CHANGE UP (ref 24.5–35.6)
BASOPHILS # BLD AUTO: 0.03 K/UL — SIGNIFICANT CHANGE UP (ref 0–0.2)
BASOPHILS # BLD AUTO: 0.03 K/UL — SIGNIFICANT CHANGE UP (ref 0–0.2)
BASOPHILS NFR BLD AUTO: 0.2 % — SIGNIFICANT CHANGE UP (ref 0–2)
BASOPHILS NFR BLD AUTO: 0.2 % — SIGNIFICANT CHANGE UP (ref 0–2)
BLD GP AB SCN SERPL QL: NEGATIVE — SIGNIFICANT CHANGE UP
BLD GP AB SCN SERPL QL: NEGATIVE — SIGNIFICANT CHANGE UP
BUN SERPL-MCNC: 31 MG/DL — HIGH (ref 7–23)
BUN SERPL-MCNC: 31 MG/DL — HIGH (ref 7–23)
CALCIUM SERPL-MCNC: 9.6 MG/DL — SIGNIFICANT CHANGE UP (ref 8.4–10.5)
CALCIUM SERPL-MCNC: 9.6 MG/DL — SIGNIFICANT CHANGE UP (ref 8.4–10.5)
CHLORIDE SERPL-SCNC: 96 MMOL/L — SIGNIFICANT CHANGE UP (ref 96–108)
CHLORIDE SERPL-SCNC: 96 MMOL/L — SIGNIFICANT CHANGE UP (ref 96–108)
CK MB CFR SERPL CALC: 1.6 NG/ML — SIGNIFICANT CHANGE UP (ref 0–6.7)
CK MB CFR SERPL CALC: 1.6 NG/ML — SIGNIFICANT CHANGE UP (ref 0–6.7)
CK SERPL-CCNC: 55 U/L — SIGNIFICANT CHANGE UP (ref 25–170)
CK SERPL-CCNC: 55 U/L — SIGNIFICANT CHANGE UP (ref 25–170)
CO2 SERPL-SCNC: 27 MMOL/L — SIGNIFICANT CHANGE UP (ref 22–31)
CO2 SERPL-SCNC: 27 MMOL/L — SIGNIFICANT CHANGE UP (ref 22–31)
CREAT SERPL-MCNC: 0.79 MG/DL — SIGNIFICANT CHANGE UP (ref 0.5–1.3)
CREAT SERPL-MCNC: 0.79 MG/DL — SIGNIFICANT CHANGE UP (ref 0.5–1.3)
EGFR: 73 ML/MIN/1.73M2 — SIGNIFICANT CHANGE UP
EGFR: 73 ML/MIN/1.73M2 — SIGNIFICANT CHANGE UP
EOSINOPHIL # BLD AUTO: 0.02 K/UL — SIGNIFICANT CHANGE UP (ref 0–0.5)
EOSINOPHIL # BLD AUTO: 0.02 K/UL — SIGNIFICANT CHANGE UP (ref 0–0.5)
EOSINOPHIL NFR BLD AUTO: 0.1 % — SIGNIFICANT CHANGE UP (ref 0–6)
EOSINOPHIL NFR BLD AUTO: 0.1 % — SIGNIFICANT CHANGE UP (ref 0–6)
GLUCOSE SERPL-MCNC: 132 MG/DL — HIGH (ref 70–99)
GLUCOSE SERPL-MCNC: 132 MG/DL — HIGH (ref 70–99)
HCT VFR BLD CALC: 36.4 % — SIGNIFICANT CHANGE UP (ref 34.5–45)
HCT VFR BLD CALC: 36.4 % — SIGNIFICANT CHANGE UP (ref 34.5–45)
HGB BLD-MCNC: 12.6 G/DL — SIGNIFICANT CHANGE UP (ref 11.5–15.5)
HGB BLD-MCNC: 12.6 G/DL — SIGNIFICANT CHANGE UP (ref 11.5–15.5)
IMM GRANULOCYTES NFR BLD AUTO: 0.7 % — SIGNIFICANT CHANGE UP (ref 0–0.9)
IMM GRANULOCYTES NFR BLD AUTO: 0.7 % — SIGNIFICANT CHANGE UP (ref 0–0.9)
INR BLD: 0.99 — SIGNIFICANT CHANGE UP (ref 0.85–1.18)
INR BLD: 0.99 — SIGNIFICANT CHANGE UP (ref 0.85–1.18)
ISTAT ACTK (ACTIVATED CLOTTING TIME KAOLIN): 257 SEC — HIGH (ref 74–137)
ISTAT ACTK (ACTIVATED CLOTTING TIME KAOLIN): 257 SEC — HIGH (ref 74–137)
ISTAT ACTK (ACTIVATED CLOTTING TIME KAOLIN): 371 SEC — HIGH (ref 74–137)
ISTAT ACTK (ACTIVATED CLOTTING TIME KAOLIN): 371 SEC — HIGH (ref 74–137)
LACTATE SERPL-SCNC: 1.2 MMOL/L — SIGNIFICANT CHANGE UP (ref 0.5–2)
LACTATE SERPL-SCNC: 1.2 MMOL/L — SIGNIFICANT CHANGE UP (ref 0.5–2)
LIDOCAIN IGE QN: 14 U/L — SIGNIFICANT CHANGE UP (ref 7–60)
LIDOCAIN IGE QN: 14 U/L — SIGNIFICANT CHANGE UP (ref 7–60)
LYMPHOCYTES # BLD AUTO: 0.74 K/UL — LOW (ref 1–3.3)
LYMPHOCYTES # BLD AUTO: 0.74 K/UL — LOW (ref 1–3.3)
LYMPHOCYTES # BLD AUTO: 5.4 % — LOW (ref 13–44)
LYMPHOCYTES # BLD AUTO: 5.4 % — LOW (ref 13–44)
MCHC RBC-ENTMCNC: 31.5 PG — SIGNIFICANT CHANGE UP (ref 27–34)
MCHC RBC-ENTMCNC: 31.5 PG — SIGNIFICANT CHANGE UP (ref 27–34)
MCHC RBC-ENTMCNC: 34.6 GM/DL — SIGNIFICANT CHANGE UP (ref 32–36)
MCHC RBC-ENTMCNC: 34.6 GM/DL — SIGNIFICANT CHANGE UP (ref 32–36)
MCV RBC AUTO: 91 FL — SIGNIFICANT CHANGE UP (ref 80–100)
MCV RBC AUTO: 91 FL — SIGNIFICANT CHANGE UP (ref 80–100)
MONOCYTES # BLD AUTO: 0.92 K/UL — HIGH (ref 0–0.9)
MONOCYTES # BLD AUTO: 0.92 K/UL — HIGH (ref 0–0.9)
MONOCYTES NFR BLD AUTO: 6.8 % — SIGNIFICANT CHANGE UP (ref 2–14)
MONOCYTES NFR BLD AUTO: 6.8 % — SIGNIFICANT CHANGE UP (ref 2–14)
NEUTROPHILS # BLD AUTO: 11.8 K/UL — HIGH (ref 1.8–7.4)
NEUTROPHILS # BLD AUTO: 11.8 K/UL — HIGH (ref 1.8–7.4)
NEUTROPHILS NFR BLD AUTO: 86.8 % — HIGH (ref 43–77)
NEUTROPHILS NFR BLD AUTO: 86.8 % — HIGH (ref 43–77)
NRBC # BLD: 0 /100 WBCS — SIGNIFICANT CHANGE UP (ref 0–0)
NRBC # BLD: 0 /100 WBCS — SIGNIFICANT CHANGE UP (ref 0–0)
OB PNL STL: NEGATIVE — SIGNIFICANT CHANGE UP
OB PNL STL: NEGATIVE — SIGNIFICANT CHANGE UP
PLATELET # BLD AUTO: 295 K/UL — SIGNIFICANT CHANGE UP (ref 150–400)
PLATELET # BLD AUTO: 295 K/UL — SIGNIFICANT CHANGE UP (ref 150–400)
POTASSIUM SERPL-MCNC: 4 MMOL/L — SIGNIFICANT CHANGE UP (ref 3.5–5.3)
POTASSIUM SERPL-MCNC: 4 MMOL/L — SIGNIFICANT CHANGE UP (ref 3.5–5.3)
POTASSIUM SERPL-SCNC: 4 MMOL/L — SIGNIFICANT CHANGE UP (ref 3.5–5.3)
POTASSIUM SERPL-SCNC: 4 MMOL/L — SIGNIFICANT CHANGE UP (ref 3.5–5.3)
PROTHROM AB SERPL-ACNC: 11.3 SEC — SIGNIFICANT CHANGE UP (ref 9.5–13)
PROTHROM AB SERPL-ACNC: 11.3 SEC — SIGNIFICANT CHANGE UP (ref 9.5–13)
RBC # BLD: 4 M/UL — SIGNIFICANT CHANGE UP (ref 3.8–5.2)
RBC # BLD: 4 M/UL — SIGNIFICANT CHANGE UP (ref 3.8–5.2)
RBC # FLD: 12.5 % — SIGNIFICANT CHANGE UP (ref 10.3–14.5)
RBC # FLD: 12.5 % — SIGNIFICANT CHANGE UP (ref 10.3–14.5)
RH IG SCN BLD-IMP: NEGATIVE — SIGNIFICANT CHANGE UP
RH IG SCN BLD-IMP: NEGATIVE — SIGNIFICANT CHANGE UP
SODIUM SERPL-SCNC: 135 MMOL/L — SIGNIFICANT CHANGE UP (ref 135–145)
SODIUM SERPL-SCNC: 135 MMOL/L — SIGNIFICANT CHANGE UP (ref 135–145)
TROPONIN T, HIGH SENSITIVITY RESULT: 170 NG/L — CRITICAL HIGH (ref 0–51)
TROPONIN T, HIGH SENSITIVITY RESULT: 170 NG/L — CRITICAL HIGH (ref 0–51)
TROPONIN T, HIGH SENSITIVITY RESULT: 212 NG/L — CRITICAL HIGH (ref 0–51)
TROPONIN T, HIGH SENSITIVITY RESULT: 212 NG/L — CRITICAL HIGH (ref 0–51)
WBC # BLD: 13.6 K/UL — HIGH (ref 3.8–10.5)
WBC # BLD: 13.6 K/UL — HIGH (ref 3.8–10.5)
WBC # FLD AUTO: 13.6 K/UL — HIGH (ref 3.8–10.5)
WBC # FLD AUTO: 13.6 K/UL — HIGH (ref 3.8–10.5)

## 2023-10-26 PROCEDURE — 99223 1ST HOSP IP/OBS HIGH 75: CPT

## 2023-10-26 PROCEDURE — 74019 RADEX ABDOMEN 2 VIEWS: CPT | Mod: 26

## 2023-10-26 PROCEDURE — 71045 X-RAY EXAM CHEST 1 VIEW: CPT | Mod: 26

## 2023-10-26 PROCEDURE — 99285 EMERGENCY DEPT VISIT HI MDM: CPT | Mod: FS

## 2023-10-26 RX ORDER — CLOPIDOGREL BISULFATE 75 MG/1
75 TABLET, FILM COATED ORAL DAILY
Refills: 0 | Status: DISCONTINUED | OUTPATIENT
Start: 2023-10-27 | End: 2023-10-27

## 2023-10-26 RX ORDER — SERTRALINE 25 MG/1
1 TABLET, FILM COATED ORAL
Refills: 0 | DISCHARGE

## 2023-10-26 RX ORDER — POLYETHYLENE GLYCOL 3350 17 G/17G
17 POWDER, FOR SOLUTION ORAL EVERY 12 HOURS
Refills: 0 | Status: DISCONTINUED | OUTPATIENT
Start: 2023-10-27 | End: 2023-10-27

## 2023-10-26 RX ORDER — POLYETHYLENE GLYCOL 3350 17 G/17G
17 POWDER, FOR SOLUTION ORAL ONCE
Refills: 0 | Status: DISCONTINUED | OUTPATIENT
Start: 2023-10-26 | End: 2023-10-26

## 2023-10-26 RX ORDER — TOPIRAMATE 25 MG
1 TABLET ORAL
Refills: 0 | DISCHARGE

## 2023-10-26 RX ORDER — POLYETHYLENE GLYCOL 3350 17 G/17G
17 POWDER, FOR SOLUTION ORAL ONCE
Refills: 0 | Status: COMPLETED | OUTPATIENT
Start: 2023-10-26 | End: 2023-10-26

## 2023-10-26 RX ORDER — METOPROLOL TARTRATE 50 MG
25 TABLET ORAL DAILY
Refills: 0 | Status: DISCONTINUED | OUTPATIENT
Start: 2023-10-27 | End: 2023-10-27

## 2023-10-26 RX ORDER — TOPIRAMATE 25 MG
25 TABLET ORAL DAILY
Refills: 0 | Status: DISCONTINUED | OUTPATIENT
Start: 2023-10-26 | End: 2023-10-27

## 2023-10-26 RX ORDER — ATORVASTATIN CALCIUM 80 MG/1
40 TABLET, FILM COATED ORAL AT BEDTIME
Refills: 0 | Status: DISCONTINUED | OUTPATIENT
Start: 2023-10-26 | End: 2023-10-27

## 2023-10-26 RX ORDER — SENNA PLUS 8.6 MG/1
2 TABLET ORAL AT BEDTIME
Refills: 0 | Status: DISCONTINUED | OUTPATIENT
Start: 2023-10-26 | End: 2023-10-27

## 2023-10-26 RX ORDER — SERTRALINE 25 MG/1
25 TABLET, FILM COATED ORAL DAILY
Refills: 0 | Status: DISCONTINUED | OUTPATIENT
Start: 2023-10-26 | End: 2023-10-27

## 2023-10-26 RX ORDER — PANTOPRAZOLE SODIUM 20 MG/1
40 TABLET, DELAYED RELEASE ORAL
Refills: 0 | Status: DISCONTINUED | OUTPATIENT
Start: 2023-10-26 | End: 2023-10-27

## 2023-10-26 RX ORDER — ASPIRIN/CALCIUM CARB/MAGNESIUM 324 MG
81 TABLET ORAL DAILY
Refills: 0 | Status: DISCONTINUED | OUTPATIENT
Start: 2023-10-26 | End: 2023-10-27

## 2023-10-26 RX ORDER — METOCLOPRAMIDE HCL 10 MG
10 TABLET ORAL ONCE
Refills: 0 | Status: COMPLETED | OUTPATIENT
Start: 2023-10-26 | End: 2023-10-26

## 2023-10-26 RX ORDER — PANTOPRAZOLE SODIUM 20 MG/1
40 TABLET, DELAYED RELEASE ORAL ONCE
Refills: 0 | Status: COMPLETED | OUTPATIENT
Start: 2023-10-26 | End: 2023-10-26

## 2023-10-26 RX ORDER — TRIAMTERENE/HYDROCHLOROTHIAZID 75 MG-50MG
1 TABLET ORAL
Refills: 0 | DISCHARGE

## 2023-10-26 RX ORDER — BUPROPION HYDROCHLORIDE 150 MG/1
1 TABLET, EXTENDED RELEASE ORAL
Qty: 0 | Refills: 0 | DISCHARGE

## 2023-10-26 RX ORDER — BUPROPION HYDROCHLORIDE 150 MG/1
300 TABLET, EXTENDED RELEASE ORAL DAILY
Refills: 0 | Status: DISCONTINUED | OUTPATIENT
Start: 2023-10-26 | End: 2023-10-27

## 2023-10-26 RX ORDER — SODIUM CHLORIDE 9 MG/ML
1000 INJECTION INTRAMUSCULAR; INTRAVENOUS; SUBCUTANEOUS ONCE
Refills: 0 | Status: COMPLETED | OUTPATIENT
Start: 2023-10-26 | End: 2023-10-26

## 2023-10-26 RX ORDER — HEPARIN SODIUM 5000 [USP'U]/ML
INJECTION INTRAVENOUS; SUBCUTANEOUS
Qty: 25000 | Refills: 0 | Status: DISCONTINUED | OUTPATIENT
Start: 2023-10-26 | End: 2023-10-27

## 2023-10-26 RX ORDER — CLOPIDOGREL BISULFATE 75 MG/1
75 TABLET, FILM COATED ORAL ONCE
Refills: 0 | Status: COMPLETED | OUTPATIENT
Start: 2023-10-26 | End: 2023-10-26

## 2023-10-26 RX ORDER — INFLUENZA VIRUS VACCINE 15; 15; 15; 15 UG/.5ML; UG/.5ML; UG/.5ML; UG/.5ML
0.7 SUSPENSION INTRAMUSCULAR ONCE
Refills: 0 | Status: DISCONTINUED | OUTPATIENT
Start: 2023-10-26 | End: 2023-10-27

## 2023-10-26 RX ADMIN — SENNA PLUS 2 TABLET(S): 8.6 TABLET ORAL at 22:30

## 2023-10-26 RX ADMIN — CLOPIDOGREL BISULFATE 75 MILLIGRAM(S): 75 TABLET, FILM COATED ORAL at 21:26

## 2023-10-26 RX ADMIN — PANTOPRAZOLE SODIUM 40 MILLIGRAM(S): 20 TABLET, DELAYED RELEASE ORAL at 21:06

## 2023-10-26 RX ADMIN — Medication 104 MILLIGRAM(S): at 20:47

## 2023-10-26 RX ADMIN — ATORVASTATIN CALCIUM 40 MILLIGRAM(S): 80 TABLET, FILM COATED ORAL at 22:30

## 2023-10-26 RX ADMIN — Medication 81 MILLIGRAM(S): at 21:26

## 2023-10-26 RX ADMIN — SODIUM CHLORIDE 1000 MILLILITER(S): 9 INJECTION INTRAMUSCULAR; INTRAVENOUS; SUBCUTANEOUS at 20:22

## 2023-10-26 RX ADMIN — POLYETHYLENE GLYCOL 3350 17 GRAM(S): 17 POWDER, FOR SOLUTION ORAL at 21:29

## 2023-10-26 NOTE — H&P ADULT - PROBLEM SELECTOR PLAN 4
appropriate mood and affect  - Continue Buproprion 300mg QD, Sertraline 25mg QD and Topiramate 25mg QD    F: None  E: Replete if K<4 or Mag<2  N: DASH Diet  GIppx: Pantoprazole  VTEppx: Lovenox  Dispo: Cardiac tele  PT: pending eval back to [facility] appropriate mood and affect  - Continue Buproprion 300mg QD, Sertraline 25mg QD and Topiramate 25mg QD    F: None  E: Replete if K<4 or Mag<2  N: DASH Diet  GIppx: Pantoprazole  VTEppx: Heparin gtt  Dispo: Cardiac tele  PT: pending eval back to [facility]

## 2023-10-26 NOTE — ED ADULT NURSE NOTE - OBJECTIVE STATEMENT
Pt BIBEMS c/o N/V this morning, experiencing sharp 8/10 chest pain during inspiration and 0/10 pain during expiration, 6/10 dull pain in abdomen. Pt is a DNR/DNI. Pt feels weak, dizzy, and hot without a fever and has a PMH of falls, and vertigo. Patient had stents placed on left side last week with bruising in upper left extremity. Pt A&Ox4, breathing unlabored and equal, denies sob and chills.

## 2023-10-26 NOTE — ED CLERICAL - NS ED CARE COORDINATION INFORMATION
This patient is eligible for (or currently enrolled in) an outpatient care management program available through [employer]. This program can coordinate outpatient follow up and assist the patient in accessing a variety of outpatient resources.  If discharged from the ED, the patient will be contacted to see if any additional resources are needed.                                                                                    Please call the Nurse Clinical Call Center at [phone] with any questions or for assistance in discharge planning.

## 2023-10-26 NOTE — ED ADULT NURSE NOTE - NSFALLHARMRISKINTERV_ED_ALL_ED
Assistance OOB with selected safe patient handling equipment if applicable/Assistance with ambulation/Communicate risk of Fall with Harm to all staff, patient, and family/Monitor gait and stability/Provide visual cue: red socks, yellow wristband, yellow gown, etc/Reinforce activity limits and safety measures with patient and family/Bed in lowest position, wheels locked, appropriate side rails in place/Call bell, personal items and telephone in reach/Instruct patient to call for assistance before getting out of bed/chair/stretcher/Non-slip footwear applied when patient is off stretcher/[facility] to call system/Physically safe environment - no spills, clutter or unnecessary equipment/Purposeful Proactive Rounding/Room/bathroom lighting operational, light cord in reach

## 2023-10-26 NOTE — ED PROVIDER NOTE - PROGRESS NOTE DETAILS
pt has elevated troponin-212. at this point concerned for possible re-stenosis. Case discussed with cardiology fellow and PA on call. will admit to cardiac tele for further management.

## 2023-10-26 NOTE — PATIENT PROFILE ADULT - FALL HARM RISK - DEVICES
Spoke with Leila who reports Karine has been doing PT and OT and now has permanent casts on and is being discharged from the TCU tomorrow, 11/4/22, to Leila's house.  Karine has an appointment with Dr. Martinez on 11/11/22 at 11:20 am.    DELIA FernandesN, RN, [facility]  11/03/22  12:06 PM        
Walker

## 2023-10-26 NOTE — ED ADULT NURSE REASSESSMENT NOTE - NS ED NURSE REASSESS COMMENT FT1
pt is noted to be aox3, able to maintain airway, having non labored breathing, and in no acute distress.

## 2023-10-26 NOTE — PATIENT PROFILE ADULT - FUNCTIONAL ASSESSMENT - BASIC MOBILITY 6.
2-calculated by average/Not able to assess (calculate score using [facility] averaging method)

## 2023-10-26 NOTE — ED PROVIDER NOTE - OBJECTIVE STATEMENT
82 y/o F with PMHx vertigo, Menetrier disease, anxiety, poor balance, multiple traumatic falls over the past year, and recent admission to [facility] for NSTEMI s/p 2 stents placed, discharged to [facility] on 10/23 now presents to ED c/o persistent nausea and multiple episodes of vomiting since this morning. Also c/o constipation with last BM 1 week ago and mild diffuse abdominal pain. Pt notes she was not given any stool softeners or suppositories for constipation at the rehab facility. She also reports it is very difficult to have a BM on the bedpan. Denies fever, chills, dysuria, hematuria, cough, SOB, or new complaints. Admits to having midsternal chest discomfort described as pressure-like sensation since stents were placed. No back pain. Pt also notes she feels very dehydrated with dry lips.

## 2023-10-26 NOTE — ED PROVIDER NOTE - GASTROINTESTINAL, MLM
Abdomen soft, mild epigastric and lower mid abdominal tenderness, no guarding. No hemorrhoids on rectal. No stool impaction in rectum.

## 2023-10-26 NOTE — ED PROVIDER NOTE - MUSCULOSKELETAL, MLM
Spine appears normal, range of motion is not limited, no muscle or joint tenderness. Legs with good pulses. Sensation intact. Normal ROM in all extremities x4.

## 2023-10-26 NOTE — ED ADULT TRIAGE NOTE - CHIEF COMPLAINT QUOTE
pt sent from [facility] rehab for n/v and abdominal pain since this afternoon. states she had cardiac stents placed last week and is currently in rehab at [facility] rehab. pt sent from [facility] rehab for n/v and abdominal pain since this afternoon. states she had cardiac stents placed last week and is currently in rehab at [facility] rehab. copy of DNR/DNI in chart

## 2023-10-26 NOTE — H&P ADULT - NSHPLABSRESULTS_GEN_ALL_CORE
12.6   13.60 )-----------( 295      ( 26 Oct 2023 19:47 )             36.4       10-26    135  |  96  |  31<H>  ----------------------------<  132<H>  4.0   |  27  |  0.79    Ca    9.6      26 Oct 2023 19:47    TPro  7.0  /  Alb  4.1  /  TBili  0.5  /  DBili  0.2  /  AST  36  /  ALT  51<H>  /  AlkPhos  129<H>  10-26      PT/INR - ( 26 Oct 2023 19:47 )   PT: 11.3 sec;   INR: 0.99          PTT - ( 26 Oct 2023 19:47 )  PTT:27.7 sec    CARDIAC MARKERS ( 26 Oct 2023 19:47 )  x     / x     / 59 U/L / x     / 1.7 ng/mL        Urinalysis Basic - ( 26 Oct 2023 19:47 )    Color: x / Appearance: x / SG: x / pH: x  Gluc: 132 mg/dL / Ketone: x  / Bili: x / Urobili: x   Blood: x / Protein: x / Nitrite: x   Leuk Esterase: x / RBC: x / WBC x   Sq Epi: x / Non Sq Epi: x / Bacteria: x        EKG: NSR, non ischemic

## 2023-10-26 NOTE — ED ADULT NURSE NOTE - CHIEF COMPLAINT QUOTE
pt sent from [facility] rehab for n/v and abdominal pain since this afternoon. states she had cardiac stents placed last week and is currently in rehab at [facility] rehab. copy of DNR/DNI in chart

## 2023-10-26 NOTE — H&P ADULT - HISTORY OF PRESENT ILLNESS
83F, DNR/DNI (rescinded until 11/20/23), w/ PMHx of recent NSTEMI s/p PCI 10/20/23 (Shockwave/DORITA x 2 pLAD), Meniere's disease, Vertigo, Anxiety d/o and h/o multiple falls, now returns to [facility] ED from [facility] facility c/o persistent nausea and multiple episodes of NBNB emesis since this morning. Pt also endorses constipation w/ last BM 1 week ago.    In ED, /71, HR 94bpm, T 98.4, RR 18, SpO2 97% RA. Labs significant for hsTrop T 212, CK __, CKMB  __, Lactate wnl, BUN 31, Cr 0.79, WBC 13.6 w/ left shift, Alk Phos 129, ALT 51, AST 36.  EKG ____  Abd Xray ____  Intervention: NS BOlus 1L, Pantoprazole 40mg IVP and Reglan 10mg IVP,    Pt now admitted to cardiac tele for r/o ACS 83F, DNR/DNI (rescinded until 11/20/23), w/ PMHx of HTN, recent NSTEMI s/p PCI 10/20/23 (Shockwave/DORITA x 2 pLAD), Meniere's disease, Vertigo, Anxiety d/o and h/o multiple falls, now returns to [facility] ED from [facility] facility c/o persistent nausea and multiple episodes of NBNB emesis since this morning. Pt also endorses constipation w/ last BM 1 week ago.    In ED, /71, HR 94bpm, T 98.4, RR 18, SpO2 97% RA. Labs significant for hsTrop T 212, CK/CKMB  __, Lactate wnl, BUN 31, Cr 0.79, WBC 13.6 w/ left shift, Alk Phos 129, ALT 51, AST 36.  EKG: NSR, non ischemic  CXR: clear lungs  Abd Xray:  Intervention: 1L NS Bolus, Pantoprazole 40mg IVP and Reglan 10mg IVP,    Pt now admitted to cardiac tele for r/o ACS 83F, DNR/DNI (rescinded until 11/20/23), w/ PMHx of HTN, recent NSTEMI s/p PCI 10/20/23 (Shockwave/DORITA x 2 pLAD), Meniere's disease, Vertigo, Anxiety d/o and h/o multiple falls, now returns to [facility] ED from [facility] facility c/o persistent nausea and multiple episodes of NBNB emesis since this morning. Pt also endorses constipation w/ last BM 1 week ago.    In ED, /71, HR 94bpm, T 98.4, RR 18, SpO2 97% RA. Labs significant for hsTrop T 212, CK/CKMB negative, Lactate negative, BUN 31, Cr 0.79, WBC 13.6 w/ left shift, Alk Phos 129, ALT 51, AST 36.  EKG: NSR, non ischemic  CXR: clear lungs  Abd Xray:  Intervention: 1L NS Bolus, Pantoprazole 40mg IVP and Reglan 10mg IVP,    Pt now admitted to cardiac tele for r/o ACS 83F, DNR/DNI (rescinded until 11/20/23), w/ PMHx of HTN, recent NSTEMI s/p PCI 10/20/23 (Shockwave/DORITA x 2 pLAD), Meniere's disease, Vertigo, Anxiety d/o and h/o multiple falls, now returns to [facility] ED from [facility] facility c/o persistent nausea and multiple episodes of NBNB emesis since this morning. Pt also endorses constipation w/ last BM 1 week ago. She denies any abd pain, fevers or sick contact at [facility]. She also reports DAPT compliance and denies any CP, palpitations, dizziness, syncope, diaphoresis, fatigue, LE edema, COOLEY, orthopnea, or PND     In ED, /71, HR 94bpm, T 98.4, RR 18, SpO2 97% RA. Labs significant for hsTrop T 212, CK/CKMB negative, Lactate negative, BUN 31, Cr 0.79, WBC 13.6 w/ left shift, Alk Phos 129, ALT 51, AST 36.  EKG: NSR, non ischemic  CXR: clear lungs  Intervention: 1L NS Bolus, Pantoprazole 40mg IVP and Reglan 10mg IVP,    Pt now admitted to cardiac tele for r/o ACS

## 2023-10-26 NOTE — H&P ADULT - NS ATTEND AMEND GEN_ALL_CORE FT
83F, DNR/DNI (rescinded until 11/20/23), w/ PMHx of HTN, recent NSTEMI s/p PCI 10/20/23 (Shockwave/DORITA x 2 pLAD), Meniere's disease, Vertigo, Anxiety d/o and h/o multiple falls, now returns to [facility] ED from [facility] facility c/o persistent nausea and multiple episodes of NBNB emesis since this morning.    1. Nausea/vomiting  Unrelated to CAD, secondary to constipation. Bowel regimen, if feeling better, ok to discharge.

## 2023-10-26 NOTE — ED PROVIDER NOTE - CLINICAL SUMMARY MEDICAL DECISION MAKING FREE TEXT BOX
84 y/o F with PMHx vertigo, Menetrier disease, poor balance, mutliple falls over the past year, and recent admission to [facility] from 10/16-10/23 for NSTEMI s/p stents x2 placed, currently in [facility] after discharge from [facility] now presents to ED BIBA for persistent nausea and vomiting x1 day. Pt states she vomited more than 5 times. Also c/o constipation x1 week. Pt is afebrile and pale but nontoxic appearing. Will obtain labs, CXR, antiemetics, and hydration. Will likely need stool softener, laxative, and possible abdominal CT. 84 y/o F with PMHx vertigo, Menetrier disease, poor balance, mutliple falls over the past year, and recent admission to [facility] from 10/16-10/23 for NSTEMI s/p stents x2 placed, currently in [facility] after discharge from [facility] now presents to ED BIBA for persistent nausea and vomiting x1 day. Pt states she vomited more than 5 times. Also c/o constipation x1 week. Pt is afebrile and pale but nontoxic appearing. Will obtain labs, CXR, abd. xray, antiemetics, and hydration. Will likely need stool softener/ laxative. will re-evaluate.

## 2023-10-26 NOTE — PATIENT PROFILE ADULT - FALL HARM RISK - HARM RISK INTERVENTIONS
Assistance with ambulation/Assistance OOB with selected safe patient handling equipment/Communicate Risk of Fall with Harm to all staff/Discuss with provider need for PT consult/Monitor gait and stability/Provide patient with walking aids - walker, cane, crutches/Reinforce activity limits and safety measures with patient and family/Sit up slowly, dangle for a short time, stand at bedside before walking/Tailored Fall Risk Interventions/Visual Cue: Yellow wristband and red socks/Bed in lowest position, wheels locked, appropriate side rails in place/Call bell, personal items and telephone in reach/Instruct patient to call for assistance before getting out of bed or chair/Non-slip footwear when patient is out of bed/[place] to call system/Physically safe environment - no spills, clutter or unnecessary equipment/Purposeful Proactive Rounding/Room/bathroom lighting operational, light cord in reach

## 2023-10-26 NOTE — H&P ADULT - ASSESSMENT
83F, DNR/DNI (rescinded until 11/20/23), w/ PMHx of HTN, recent NSTEMI s/p PCI 10/20/23 (Shockwave/DORITA x 2 pLAD), Meniere's disease, Vertigo, Anxiety d/o and h/o multiple falls, now returns to [facility] ED from [facility] facility c/o persistent nausea, NBNB emesis and constipation, pt found to have elevated hsTropT and admitted to cardiac telemetry for r/o ACS. 83F, DNR/DNI (rescinded until 11/20/23), w/ PMHx of HTN, recent NSTEMI s/p PCI 10/20/23 (Shockwave/DORITA x 2 pLAD), Meniere's disease, Vertigo, Anxiety d/o and h/o multiple falls, now returns to [facility] ED from [facility] facility c/o persistent nausea, NBNB emesis and constipation,  and pt admitted to cardiac telemetry for r/o ACS.

## 2023-10-26 NOTE — H&P ADULT - NSICDXPASTMEDICALHX_GEN_ALL_CORE_FT
PAST MEDICAL HISTORY:  Anxiety disorder     CAD (coronary artery disease)     HTN (hypertension)     Menetrier disease     NSTEMI (non-ST elevation myocardial infarction)     Vertigo

## 2023-10-26 NOTE — H&P ADULT - PROBLEM SELECTOR PLAN 1
recent NSTEMI 10/20/23 and reports DAPT compliance, pt remains CP free and HD stable  - hsTropT 212 (prev NSTEMI peaked at 259), CK/CKMB ___; f/u repeat CE  - EKG: NSR, non ischemic  - TTE 10/16/23:  normal LVEF, no significant valvular disease  - Cardiac Cath 10/20/23: IVUS guided Shockwave/DORITA x 2 to pLAD (90-95%); LM mild, D1 mild diffuse, LCx/LM1 mild diffuse, RCA mod diffuse, RPDA mild diffuse  - Continue ASA 81mg QD, Plavix 75mg QD, Lipitor 40mg HS and Toprol recent NSTEMI 10/20/23 and reports DAPT compliance, pt remains CP free and HD stable  - hsTropT 212 (prev NSTEMI peaked at 259), CK/CKMB negative, f/u repeat CE  - EKG: NSR, non ischemic  - TTE 10/16/23:  normal LVEF, no significant valvular disease  - Cardiac Cath 10/20/23: IVUS guided Shockwave/DORITA x 2 to pLAD (90-95%); LM mild, D1 mild diffuse, LCx/LM1 mild diffuse, RCA mod diffuse, RPDA mild diffuse  - Continue ASA 81mg QD, Plavix 75mg QD, Lipitor 40mg HS and Toprol presents w/ persistent nausea and multiple episodes of NBNB emesis since this AM and last BM 1 week ago; pt currently afebrile and non toxic appearing  - WBC 13 w/ left shift, and lactate negative  - Alk Phos 129 (prev 85), ALT 51 (prev 45), AST wnl  - f/u Abd Xray  - Start Miralax, Senna, and Pantoprazole presents w/ persistent nausea and multiple episodes of NBNB emesis since this AM and last BM 1 week ago; pt currently afebrile and non toxic appearing  - WBC 13 w/ left shift, and lactate negative  - Alk Phos 129 (prev 85), ALT 51 (prev 45), AST wnl. Continue to trend LFTs  - f/u Abd Xray  - Start Miralax, Senna, and Pantoprazole presents w/ persistent nausea and multiple episodes of NBNB emesis since this AM and last BM 1 week ago; pt currently afebrile and non toxic appearing  - WBC 13 w/ left shift and lactate negative  - Alk Phos 129 (prev 85), ALT 51 (prev 45), AST wnl. Continue to trend LFTs  - f/u Abd Xray  - Start Miralax, Senna, and Pantoprazole

## 2023-10-26 NOTE — H&P ADULT - PROBLEM SELECTOR PLAN 2
SBP stable  - Continue recent NSTEMI 10/20/23 and reports DAPT compliance, pt remains CP free and HD stable  - hsTropT 212 (prev NSTEMI peaked at 259), CK/CKMB negative, f/u repeat CE  - EKG: NSR, non ischemic  - TTE 10/16/23:  normal LVEF, no significant valvular disease  - Cardiac Cath 10/20/23: IVUS guided Shockwave/DORITA x 2 to pLAD (90-95%); LM mild, D1 mild diffuse, LCx/LM1 mild diffuse, RCA mod diffuse, RPDA mild diffuse  - Consider interventional cards consult in AM to review films  - Continue ASA 81mg QD, Plavix 75mg QD, Lipitor 40mg HS and Toprol recent NSTEMI 10/20/23 and reports DAPT compliance, pt remains CP free and HD stable  - hsTropT 212 (prior NSTEMI peaked at 259), CK/CKMB negative, f/u repeat CE  - EKG: NSR, non ischemic  - TTE 10/16/23:  normal LVEF, no significant valvular disease  - Cardiac Cath 10/20/23: IVUS guided Shockwave/DORITA x 2 to pLAD (90-95%); LM mild, D1 mild diffuse, LCx/LM1 mild diffuse, RCA mod diffuse, RPDA mild diffuse  - Consider interventional cards consult in AM to review films  - Continue ASA 81mg QD, Plavix 75mg QD, Lipitor 40mg HS and Toprol recent NSTEMI 10/20/23 and reports DAPT compliance, pt remains CP free and HD stable  - hsTropT 212 (prior NSTEMI peaked at 259), CK/CKMB negative, f/u repeat CE  - EKG: NSR, non ischemic  - TTE 10/16/23:  normal LVEF, no significant valvular disease  - Cardiac Cath 10/20/23: IVUS guided Shockwave/DORITA x 2 to pLAD (90-95%); LM mild, D1 mild diffuse, LCx/LM1 mild diffuse, RCA mod diffuse, RPDA mild diffuse  - NPO after MN and consult Interventional cards in AM to review films  - Start Heparin gtt  - Continue ASA 81mg QD, Plavix 75mg QD, Lipitor 40mg HS and Toprol 25mg QD recent NSTEMI 10/20/23 and reports DAPT compliance, pt remains CP free and HD stable  - hsTropT 212 (prior NSTEMI peaked at 259), CK/CKMB negative, f/u repeat CE  - EKG: NSR, non ischemic  - TTE 10/16/23:  normal LVEF, no significant valvular disease  - Cardiac Cath 10/20/23: IVUS guided Shockwave/DORITA x 2 to pLAD (90-95%); LM mild, D1 mild diffuse, LCx/LM1 mild diffuse, RCA mod diffuse, RPDA mild diffuse  - NPO after MN and consult Interventional cards in AM to review films  - Start Heparin gtt  - Continue ASA 81mg QD, Plavix 75mg QD, Lipitor 40mg HS and Toprol 25mg QD  - f/u P2Y12 in AM

## 2023-10-26 NOTE — H&P ADULT - PROBLEM SELECTOR PLAN 3
presents w/ persistent nausea and multiple episodes of NBNB emesis since this AM and last BM 1 week ago; afebrile and non toxic appearing  - WBC 13 w/ left shift, and lactate negative  - Alk Phos 129 (prev 85), ALT 51 (prev 45), AST wnl  - f/u Abd Xray SBP stable  - Continue Toprol 25mg QD  - Holding Triamt/HCTZ 2/2 dehydration

## 2023-10-27 ENCOUNTER — TRANSCRIPTION ENCOUNTER (OUTPATIENT)
Age: 83
End: 2023-10-27

## 2023-10-27 ENCOUNTER — APPOINTMENT (OUTPATIENT)
Dept: HEART AND VASCULAR | Facility: CLINIC | Age: 83
End: 2023-10-27

## 2023-10-27 VITALS
TEMPERATURE: 98 F | HEART RATE: 80 BPM | DIASTOLIC BLOOD PRESSURE: 57 MMHG | OXYGEN SATURATION: 98 % | RESPIRATION RATE: 18 BRPM | SYSTOLIC BLOOD PRESSURE: 108 MMHG

## 2023-10-27 LAB
ALBUMIN SERPL ELPH-MCNC: 3.5 G/DL — SIGNIFICANT CHANGE UP (ref 3.3–5)
ALBUMIN SERPL ELPH-MCNC: 3.5 G/DL — SIGNIFICANT CHANGE UP (ref 3.3–5)
ALP SERPL-CCNC: 113 U/L — SIGNIFICANT CHANGE UP (ref 40–120)
ALP SERPL-CCNC: 113 U/L — SIGNIFICANT CHANGE UP (ref 40–120)
ALT FLD-CCNC: 40 U/L — SIGNIFICANT CHANGE UP (ref 10–45)
ALT FLD-CCNC: 40 U/L — SIGNIFICANT CHANGE UP (ref 10–45)
ANION GAP SERPL CALC-SCNC: 11 MMOL/L — SIGNIFICANT CHANGE UP (ref 5–17)
ANION GAP SERPL CALC-SCNC: 11 MMOL/L — SIGNIFICANT CHANGE UP (ref 5–17)
APTT BLD: 40.6 SEC — HIGH (ref 24.5–35.6)
APTT BLD: 40.6 SEC — HIGH (ref 24.5–35.6)
AST SERPL-CCNC: 27 U/L — SIGNIFICANT CHANGE UP (ref 10–40)
AST SERPL-CCNC: 27 U/L — SIGNIFICANT CHANGE UP (ref 10–40)
BASOPHILS # BLD AUTO: 0.03 K/UL — SIGNIFICANT CHANGE UP (ref 0–0.2)
BASOPHILS # BLD AUTO: 0.03 K/UL — SIGNIFICANT CHANGE UP (ref 0–0.2)
BASOPHILS NFR BLD AUTO: 0.3 % — SIGNIFICANT CHANGE UP (ref 0–2)
BASOPHILS NFR BLD AUTO: 0.3 % — SIGNIFICANT CHANGE UP (ref 0–2)
BILIRUB SERPL-MCNC: 0.5 MG/DL — SIGNIFICANT CHANGE UP (ref 0.2–1.2)
BILIRUB SERPL-MCNC: 0.5 MG/DL — SIGNIFICANT CHANGE UP (ref 0.2–1.2)
BUN SERPL-MCNC: 21 MG/DL — SIGNIFICANT CHANGE UP (ref 7–23)
BUN SERPL-MCNC: 21 MG/DL — SIGNIFICANT CHANGE UP (ref 7–23)
CALCIUM SERPL-MCNC: 9 MG/DL — SIGNIFICANT CHANGE UP (ref 8.4–10.5)
CALCIUM SERPL-MCNC: 9 MG/DL — SIGNIFICANT CHANGE UP (ref 8.4–10.5)
CHLORIDE SERPL-SCNC: 102 MMOL/L — SIGNIFICANT CHANGE UP (ref 96–108)
CHLORIDE SERPL-SCNC: 102 MMOL/L — SIGNIFICANT CHANGE UP (ref 96–108)
CO2 SERPL-SCNC: 26 MMOL/L — SIGNIFICANT CHANGE UP (ref 22–31)
CO2 SERPL-SCNC: 26 MMOL/L — SIGNIFICANT CHANGE UP (ref 22–31)
CREAT SERPL-MCNC: 0.7 MG/DL — SIGNIFICANT CHANGE UP (ref 0.5–1.3)
CREAT SERPL-MCNC: 0.7 MG/DL — SIGNIFICANT CHANGE UP (ref 0.5–1.3)
EGFR: 85 ML/MIN/1.73M2 — SIGNIFICANT CHANGE UP
EGFR: 85 ML/MIN/1.73M2 — SIGNIFICANT CHANGE UP
EOSINOPHIL # BLD AUTO: 0.07 K/UL — SIGNIFICANT CHANGE UP (ref 0–0.5)
EOSINOPHIL # BLD AUTO: 0.07 K/UL — SIGNIFICANT CHANGE UP (ref 0–0.5)
EOSINOPHIL NFR BLD AUTO: 0.8 % — SIGNIFICANT CHANGE UP (ref 0–6)
EOSINOPHIL NFR BLD AUTO: 0.8 % — SIGNIFICANT CHANGE UP (ref 0–6)
GLUCOSE SERPL-MCNC: 92 MG/DL — SIGNIFICANT CHANGE UP (ref 70–99)
GLUCOSE SERPL-MCNC: 92 MG/DL — SIGNIFICANT CHANGE UP (ref 70–99)
HCT VFR BLD CALC: 33.2 % — LOW (ref 34.5–45)
HCT VFR BLD CALC: 33.2 % — LOW (ref 34.5–45)
HGB BLD-MCNC: 11.1 G/DL — LOW (ref 11.5–15.5)
HGB BLD-MCNC: 11.1 G/DL — LOW (ref 11.5–15.5)
IMM GRANULOCYTES NFR BLD AUTO: 0.8 % — SIGNIFICANT CHANGE UP (ref 0–0.9)
IMM GRANULOCYTES NFR BLD AUTO: 0.8 % — SIGNIFICANT CHANGE UP (ref 0–0.9)
LYMPHOCYTES # BLD AUTO: 1.6 K/UL — SIGNIFICANT CHANGE UP (ref 1–3.3)
LYMPHOCYTES # BLD AUTO: 1.6 K/UL — SIGNIFICANT CHANGE UP (ref 1–3.3)
LYMPHOCYTES # BLD AUTO: 18.3 % — SIGNIFICANT CHANGE UP (ref 13–44)
LYMPHOCYTES # BLD AUTO: 18.3 % — SIGNIFICANT CHANGE UP (ref 13–44)
MCHC RBC-ENTMCNC: 31.2 PG — SIGNIFICANT CHANGE UP (ref 27–34)
MCHC RBC-ENTMCNC: 31.2 PG — SIGNIFICANT CHANGE UP (ref 27–34)
MCHC RBC-ENTMCNC: 33.4 GM/DL — SIGNIFICANT CHANGE UP (ref 32–36)
MCHC RBC-ENTMCNC: 33.4 GM/DL — SIGNIFICANT CHANGE UP (ref 32–36)
MCV RBC AUTO: 93.3 FL — SIGNIFICANT CHANGE UP (ref 80–100)
MCV RBC AUTO: 93.3 FL — SIGNIFICANT CHANGE UP (ref 80–100)
MONOCYTES # BLD AUTO: 0.92 K/UL — HIGH (ref 0–0.9)
MONOCYTES # BLD AUTO: 0.92 K/UL — HIGH (ref 0–0.9)
MONOCYTES NFR BLD AUTO: 10.5 % — SIGNIFICANT CHANGE UP (ref 2–14)
MONOCYTES NFR BLD AUTO: 10.5 % — SIGNIFICANT CHANGE UP (ref 2–14)
NEUTROPHILS # BLD AUTO: 6.06 K/UL — SIGNIFICANT CHANGE UP (ref 1.8–7.4)
NEUTROPHILS # BLD AUTO: 6.06 K/UL — SIGNIFICANT CHANGE UP (ref 1.8–7.4)
NEUTROPHILS NFR BLD AUTO: 69.3 % — SIGNIFICANT CHANGE UP (ref 43–77)
NEUTROPHILS NFR BLD AUTO: 69.3 % — SIGNIFICANT CHANGE UP (ref 43–77)
NRBC # BLD: 0 /100 WBCS — SIGNIFICANT CHANGE UP (ref 0–0)
NRBC # BLD: 0 /100 WBCS — SIGNIFICANT CHANGE UP (ref 0–0)
PA ADP PRP-ACNC: 165 PRU — LOW (ref 194–418)
PA ADP PRP-ACNC: 165 PRU — LOW (ref 194–418)
PLATELET # BLD AUTO: 273 K/UL — SIGNIFICANT CHANGE UP (ref 150–400)
PLATELET # BLD AUTO: 273 K/UL — SIGNIFICANT CHANGE UP (ref 150–400)
POTASSIUM SERPL-MCNC: 3.4 MMOL/L — LOW (ref 3.5–5.3)
POTASSIUM SERPL-MCNC: 3.4 MMOL/L — LOW (ref 3.5–5.3)
POTASSIUM SERPL-SCNC: 3.4 MMOL/L — LOW (ref 3.5–5.3)
POTASSIUM SERPL-SCNC: 3.4 MMOL/L — LOW (ref 3.5–5.3)
PROT SERPL-MCNC: 6.1 G/DL — SIGNIFICANT CHANGE UP (ref 6–8.3)
PROT SERPL-MCNC: 6.1 G/DL — SIGNIFICANT CHANGE UP (ref 6–8.3)
RBC # BLD: 3.56 M/UL — LOW (ref 3.8–5.2)
RBC # BLD: 3.56 M/UL — LOW (ref 3.8–5.2)
RBC # FLD: 12.7 % — SIGNIFICANT CHANGE UP (ref 10.3–14.5)
RBC # FLD: 12.7 % — SIGNIFICANT CHANGE UP (ref 10.3–14.5)
SODIUM SERPL-SCNC: 139 MMOL/L — SIGNIFICANT CHANGE UP (ref 135–145)
SODIUM SERPL-SCNC: 139 MMOL/L — SIGNIFICANT CHANGE UP (ref 135–145)
WBC # BLD: 8.75 K/UL — SIGNIFICANT CHANGE UP (ref 3.8–10.5)
WBC # BLD: 8.75 K/UL — SIGNIFICANT CHANGE UP (ref 3.8–10.5)
WBC # FLD AUTO: 8.75 K/UL — SIGNIFICANT CHANGE UP (ref 3.8–10.5)
WBC # FLD AUTO: 8.75 K/UL — SIGNIFICANT CHANGE UP (ref 3.8–10.5)

## 2023-10-27 PROCEDURE — 83605 ASSAY OF LACTIC ACID: CPT

## 2023-10-27 PROCEDURE — 86901 BLOOD TYPING SEROLOGIC RH(D): CPT

## 2023-10-27 PROCEDURE — 85576 BLOOD PLATELET AGGREGATION: CPT

## 2023-10-27 PROCEDURE — 80048 BASIC METABOLIC PNL TOTAL CA: CPT

## 2023-10-27 PROCEDURE — 85730 THROMBOPLASTIN TIME PARTIAL: CPT

## 2023-10-27 PROCEDURE — 85025 COMPLETE CBC W/AUTO DIFF WBC: CPT

## 2023-10-27 PROCEDURE — 74019 RADEX ABDOMEN 2 VIEWS: CPT

## 2023-10-27 PROCEDURE — 96375 TX/PRO/DX INJ NEW DRUG ADDON: CPT

## 2023-10-27 PROCEDURE — 82553 CREATINE MB FRACTION: CPT

## 2023-10-27 PROCEDURE — 86900 BLOOD TYPING SEROLOGIC ABO: CPT

## 2023-10-27 PROCEDURE — 71045 X-RAY EXAM CHEST 1 VIEW: CPT

## 2023-10-27 PROCEDURE — 80076 HEPATIC FUNCTION PANEL: CPT

## 2023-10-27 PROCEDURE — 80053 COMPREHEN METABOLIC PANEL: CPT

## 2023-10-27 PROCEDURE — 99285 EMERGENCY DEPT VISIT HI MDM: CPT | Mod: 25

## 2023-10-27 PROCEDURE — 96374 THER/PROPH/DIAG INJ IV PUSH: CPT

## 2023-10-27 PROCEDURE — 36415 COLL VENOUS BLD VENIPUNCTURE: CPT

## 2023-10-27 PROCEDURE — G0378: CPT

## 2023-10-27 PROCEDURE — 83690 ASSAY OF LIPASE: CPT

## 2023-10-27 PROCEDURE — 82550 ASSAY OF CK (CPK): CPT

## 2023-10-27 PROCEDURE — 84484 ASSAY OF TROPONIN QUANT: CPT

## 2023-10-27 PROCEDURE — 85610 PROTHROMBIN TIME: CPT

## 2023-10-27 PROCEDURE — 82272 OCCULT BLD FECES 1-3 TESTS: CPT

## 2023-10-27 PROCEDURE — 96376 TX/PRO/DX INJ SAME DRUG ADON: CPT

## 2023-10-27 PROCEDURE — 93005 ELECTROCARDIOGRAM TRACING: CPT

## 2023-10-27 PROCEDURE — 86850 RBC ANTIBODY SCREEN: CPT

## 2023-10-27 RX ORDER — HEPARIN SODIUM 5000 [USP'U]/ML
700 INJECTION INTRAVENOUS; SUBCUTANEOUS
Qty: 25000 | Refills: 0 | Status: DISCONTINUED | OUTPATIENT
Start: 2023-10-27 | End: 2023-10-27

## 2023-10-27 RX ORDER — POTASSIUM CHLORIDE 20 MEQ
40 PACKET (EA) ORAL EVERY 4 HOURS
Refills: 0 | Status: COMPLETED | OUTPATIENT
Start: 2023-10-27 | End: 2023-10-27

## 2023-10-27 RX ORDER — POLYETHYLENE GLYCOL 3350 17 G/17G
17 POWDER, FOR SOLUTION ORAL
Qty: 0 | Refills: 0 | DISCHARGE
Start: 2023-10-27

## 2023-10-27 RX ORDER — POTASSIUM CHLORIDE 20 MEQ
40 PACKET (EA) ORAL ONCE
Refills: 0 | Status: DISCONTINUED | OUTPATIENT
Start: 2023-10-27 | End: 2023-10-27

## 2023-10-27 RX ORDER — SENNA PLUS 8.6 MG/1
2 TABLET ORAL
Qty: 0 | Refills: 0 | DISCHARGE
Start: 2023-10-27

## 2023-10-27 RX ADMIN — HEPARIN SODIUM 7 UNIT(S)/HR: 5000 INJECTION INTRAVENOUS; SUBCUTANEOUS at 09:26

## 2023-10-27 RX ADMIN — Medication 40 MILLIEQUIVALENT(S): at 10:22

## 2023-10-27 RX ADMIN — Medication 40 MILLIEQUIVALENT(S): at 15:43

## 2023-10-27 RX ADMIN — CLOPIDOGREL BISULFATE 75 MILLIGRAM(S): 75 TABLET, FILM COATED ORAL at 12:47

## 2023-10-27 RX ADMIN — SERTRALINE 25 MILLIGRAM(S): 25 TABLET, FILM COATED ORAL at 12:48

## 2023-10-27 RX ADMIN — PANTOPRAZOLE SODIUM 40 MILLIGRAM(S): 20 TABLET, DELAYED RELEASE ORAL at 06:45

## 2023-10-27 RX ADMIN — HEPARIN SODIUM 600 UNIT(S)/HR: 5000 INJECTION INTRAVENOUS; SUBCUTANEOUS at 00:08

## 2023-10-27 RX ADMIN — POLYETHYLENE GLYCOL 3350 17 GRAM(S): 17 POWDER, FOR SOLUTION ORAL at 17:04

## 2023-10-27 RX ADMIN — Medication 81 MILLIGRAM(S): at 12:47

## 2023-10-27 RX ADMIN — HEPARIN SODIUM 600 UNIT(S)/HR: 5000 INJECTION INTRAVENOUS; SUBCUTANEOUS at 08:39

## 2023-10-27 RX ADMIN — POLYETHYLENE GLYCOL 3350 17 GRAM(S): 17 POWDER, FOR SOLUTION ORAL at 06:45

## 2023-10-27 RX ADMIN — Medication 25 MILLIGRAM(S): at 06:45

## 2023-10-27 RX ADMIN — Medication 25 MILLIGRAM(S): at 12:48

## 2023-10-27 RX ADMIN — BUPROPION HYDROCHLORIDE 300 MILLIGRAM(S): 150 TABLET, EXTENDED RELEASE ORAL at 12:47

## 2023-10-27 NOTE — DIETITIAN INITIAL EVALUATION ADULT - PERSON TAUGHT/METHOD
Tips for GI distress provided./verbal instruction/teach back - (Patient repeats in own words)/patient instructed

## 2023-10-27 NOTE — DISCHARGE NOTE PROVIDER - NSDCCPTREATMENT_GEN_ALL_CORE_FT
PRINCIPAL PROCEDURE  Procedure: Abdominal x-ray, complete  Findings and Treatment: Findings: Colonic and rectal feces, impaction. No abnormal bowel   dilatation or pneumoperitoneum. Lumbar spine degenerative changes, dextro scoliosis. Right femoral head and neck pins. Clear lung bases

## 2023-10-27 NOTE — PROVIDER CONTACT NOTE (OTHER) - BACKGROUND
Pt admit for c/o CP and elevated tpn. Recent admit this month for cardiac cath. DNR/DNI status rescinded until 11/20/2023. No form to rescind DNR/DNI, MOLST form incomplete with no MD signature.

## 2023-10-27 NOTE — DISCHARGE NOTE PROVIDER - ATTENDING DISCHARGE PHYSICAL EXAMINATION:
83F, DNR/DNI (rescinded until 11/20/23), w/ PMHx of HTN, recent NSTEMI s/p PCI 10/20/23 (Shockwave/DORITA x 2 pLAD), Meniere's disease, Vertigo, Anxiety d/o and h/o multiple falls, now returns to [facility] ED from [facility] facility c/o persistent nausea and multiple episodes of NBNB emesis since this morning.    1. Nausea/vomiting  Unrelated to CAD, secondary to constipation. Responded to enema, discharge to [facility], no need for admission, observation only.

## 2023-10-27 NOTE — DISCHARGE NOTE PROVIDER - NSDCCPCAREPLAN_GEN_ALL_CORE_FT
PRINCIPAL DISCHARGE DIAGNOSIS  Diagnosis: Constipation  Assessment and Plan of Treatment: You were diagnosed with having constipation which resolved upon administration of a tap water enema, bowel regimen with Miralax 17g twice a day, and Senna nightly. You are advised to continue taking these medications in addition to the heart medications you have been taking, especially Plavix 75mg daily & Aspirin 81mg daily.   What is constipation?  Constipation is a common problem that makes it hard to have bowel movements. Your bowel movements might be:  -Too hard  -Too small  -Hard to get out  -Happening fewer than 3 times a week  What causes constipation?  Constipation can be caused by:  -Side effects of some medicines  -Poor diet  -Diseases of the digestive system (figure 1)  What other symptoms should I watch for?  These symptoms could signal a more serious problem:  -Blood in the toilet or on the toilet paper after having a bowel movement  -Fever  -Weight loss  -Feeling weak  It could also be a sign of a problem if you have new constipation without a change in your medicines or diet, and have never had constipation in the past.  Is there anything I can do on my own to get rid of constipation?  Yes. Try these steps:  -Eat foods that have a lot of fiber. Good choices are fruits, vegetables, prune juice, and cereal (table 1).  -Drink plenty of water and other fluids.  -When you feel the need to go to the bathroom, go to the bathroom. Don't hold it.  -Take laxatives. These are medicines that help make bowel movements easier to get out. Some are pills that you swallow. Other medicines go into the rectum. These are called "suppositories" or "enemas."  Should I see a doctor or nurse?  See your doctor or nurse if:  -Your symptoms are new or not normal for you.  -You do not have a bowel movement for a few days.  -The problem comes and goes, but lasts for longer than 3 weeks.  -You are in a lot of pain.  -You have other symptoms that also worry you (for example, bleeding, weakness, weight loss, or fever).  -Other people in your family have had colorectal cancer or inflammatory bowel disease.        SECONDARY DISCHARGE DIAGNOSES  Diagnosis: Nausea and vomiting  Assessment and Plan of Treatment:

## 2023-10-27 NOTE — DIETITIAN INITIAL EVALUATION ADULT - PERTINENT MEDS FT
MEDICATIONS  (STANDING):  aspirin enteric coated 81 milliGRAM(s) Oral daily  atorvastatin 40 milliGRAM(s) Oral at bedtime  buPROPion XL (24-Hour) . 300 milliGRAM(s) Oral daily  clopidogrel Tablet 75 milliGRAM(s) Oral daily  influenza  Vaccine (HIGH DOSE) 0.7 milliLiter(s) IntraMuscular once  metoprolol succinate ER 25 milliGRAM(s) Oral daily  pantoprazole    Tablet 40 milliGRAM(s) Oral before breakfast  polyethylene glycol 3350 17 Gram(s) Oral every 12 hours  potassium chloride   Powder 40 milliEquivalent(s) Oral every 4 hours  senna 2 Tablet(s) Oral at bedtime  sertraline 25 milliGRAM(s) Oral daily  topiramate 25 milliGRAM(s) Oral daily    MEDICATIONS  (PRN):

## 2023-10-27 NOTE — DISCHARGE NOTE PROVIDER - HOSPITAL COURSE
Hospital Course Summary:    This is 84 yo F w/ PMHx of HTN, recent NSTEMI s/p PCI 10/20/23 (Shockwave/DORITA x 2 pLAD), Meniere's disease, Vertigo, Anxiety d/o and h/o multiple falls, now returns to [facility] ED from [facility] facility c/o persistent nausea and multiple episodes of NBNB emesis since this morning. Pt also endorses constipation w/ last BM 1 week ago. She denies any abd pain, fevers or sick contact at [facility]. She also reports DAPT compliance and denies any CP, palpitations, dizziness, syncope, diaphoresis, fatigue, LE edema, COOLEY, orthopnea, or PND     In ED, /71, HR 94bpm, T 98.4, RR 18, SpO2 97% RA. Labs significant for hsTrop T 212, CK/CKMB negative, Lactate negative, BUN 31, Cr 0.79, WBC 13.6 w/ left shift, Alk Phos 129, ALT 51, AST 36.  EKG: NSR, non ischemic  CXR: clear lungs  Intervention: 1L NS Bolus, Pantoprazole 40mg IVP and Reglan 10mg IVP,    Pt now admitted to cardiac tele for r/o ACS      PHYSICAL EXAM:  General:   Neurological:  Cardiovascular:  Respiratory:  Gastrointestinal:  Extremities:  Vascular:  Cath Access Sites:      Dx: Heart Failure, Unstable Angina/ACS/NSTEMI/STEMI this Admit or History of Heart Failure, : YES/NO            If Yes to CHF/ACS/AMI: 7 day Follow-Up Appointment Made: Yes/No, Yes: Date/Time; IF No, why not?           Cardiac Rehab (STEMI/NSTEMI/ACS/Unstable Angina/CHF/Post PCI):            *Education on benefits of Cardiac Rehab provided to patient: Yes         *Referral and Prescription Given for Cardiac Rehab: Yes/No.  If No, Why Not?  (see accepted reasons below)         *Pt given list of locations & instructed to contact their insurance company to review list of participating providers. Yes         *Pt instructed to bring Cardiac Rehab prescription with them to Cardiology Follow up appointment for assistance with enrollment: Yes    (Reasons for No Cardiac Rehab Referral Rx - must document 1 or more options):                Patient Refused            Medical Reason: ex needs Home Care, Home PT            Patient lacks medical coverage for Cardiac Rehab            Pt discharged to Nursing Care/[facility]/Long term Care Facility            Patient Lacks Transportation or no cardiac rehab within 60 minutes driving range            Patient already participates in Cardiac Rehab            Other: (provide details) ex: Pt discharged to Hospice; prescription printer not working & pt was instructed to obtain Rx from outpt Cardiologist.    AMI: Beta Blocker Prescribed: Yes/No. If no, Why not?            ACE-I/ARB Prescribed: Yes/No. If no, Why not? ex: Entresto prescribed, Not indicated at this time, worsening renal function    Statin Prescribed (STEMI/NSTEMI/ACS/UA &/OR Post PCI this admission):  Yes/No; If No, No Statin Prescribed due to______  DAPT: Prescriptions for Aspirin/Plavix/Brilinta/Effient e-prescribed to patient's pharmacy: Yes/No__.                *No Aspirin/Plavix/Brilinta/Effient prescribed due to ___.     Hospital Course Summary:  This is 82 yo F w/ PMHx of HTN, recent NSTEMI s/p PCI 10/20/23 (Shockwave/DORITA x 2 pLAD), Meniere's disease, Vertigo, Anxiety d/o and h/o multiple falls, now returns to [facility] ED from [facility] facility c/o persistent nausea and multiple episodes of NBNB emesis since this morning. Pt also endorses constipation w/ last BM 1 week ago. She denies any abd pain, fevers or sick contact at [facility]. She also reports DAPT compliance and denies any CP, palpitations, dizziness, syncope, diaphoresis, fatigue, LE edema, COOLEY, orthopnea, or PND,     In ED, /71, HR 94bpm, T 98.4, RR 18, SpO2 97% RA. Labs significant for hsTrop T 212, CK/CKMB negative, Lactate negative, BUN 31, Cr 0.79, WBC 13.6 w/ left shift, Alk Phos 129, ALT 51, AST 36.  EKG: NSR, non ischemic  CXR: clear lungs  Intervention: 1L NS Bolus, Pantoprazole 40mg IVP and Reglan 10mg IVP,    Pt now admitted to cardiac tele for r/o ACS    Problem-List:   #Constipation  Patient presented from [facility] facility with complaints of persistent nausea and multiple episodes of nonbloody, nonbilious emesis starting 10/26/23. Patient also reported constipation with her most recent last bowel movement 1 week ago. She was reported to have bowel movements on previous admission but reports were not consistent giving periods of waxing and waning alertness during that admission. She denied any abdominal pain, fevers or sick contact at [facility]. She also reports DAPT compliance, and denied any chest pain or endorsed any cardiac signs indicating cardiac related events. She was started on a bowel regimen with subsequent tap water enema due to fecal impaction also seen on abdominal X-ray, with subsequent multiple bowel movements and was hemodynamically dynamic.   Currently denies any chest pain, palpitations, dizziness, syncope, diaphoresis, fatigue, lower extremity edema, dyspnea on exertion, orthopnea, or PND.     PHYSICAL EXAM  General: well-groomed; no distress  EENT: PERRL; EOMI; conjunctiva clear  Neurological:cranial nerves II-XII intact; sensation intact. Normal affect; alert and oriented x3; normal behavior  Cardiovascular:regular rate and rhythm; S1 S2 present; no gallops; no rub; no murmur; no JVD; no pedal edema  Respiratory:clear to auscultation bilaterally; no wheezes; no rales; no rhonchi; no respiratory distress; respirations non-labored  Gastrointestinal:soft; nontender; nondistended; normal active bowel sounds; no guarding; no masses palpable  Extremities:warm and dry; color normal; no rashes; no ulcers        Dx: Heart Failure, Unstable Angina/ACS/NSTEMI/STEMI this Admit or History of Heart Failure, : YES/NO            If Yes to CHF/ACS/AMI: 7 day Follow-Up Appointment Made: Yes/No, Yes: Date/Time; IF No, why not?           Cardiac Rehab (STEMI/NSTEMI/ACS/Unstable Angina/CHF/Post PCI):            *Education on benefits of Cardiac Rehab provided to patient: Yes         *Referral and Prescription Given for Cardiac Rehab: Yes/No.  If No, Why Not?  (see accepted reasons below)         *Pt given list of locations & instructed to contact their insurance company to review list of participating providers. Yes         *Pt instructed to bring Cardiac Rehab prescription with them to Cardiology Follow up appointment for assistance with enrollment: Yes    (Reasons for No Cardiac Rehab Referral Rx - must document 1 or more options):                Patient Refused            Medical Reason: ex needs Home Care, Home PT            Patient lacks medical coverage for Cardiac Rehab            Pt discharged to Nursing Care/[facility]/Long term Care Facility            Patient Lacks Transportation or no cardiac rehab within 60 minutes driving range            Patient already participates in Cardiac Rehab            Other: (provide details) ex: Pt discharged to Hospice; prescription printer not working & pt was instructed to obtain Rx from outpt Cardiologist.    AMI: Beta Blocker Prescribed: Yes/No. If no, Why not?            ACE-I/ARB Prescribed: Yes/No. If no, Why not? ex: Entresto prescribed, Not indicated at this time, worsening renal function    Statin Prescribed (STEMI/NSTEMI/ACS/UA &/OR Post PCI this admission):  Yes/No; If No, No Statin Prescribed due to______  DAPT: Prescriptions for Aspirin/Plavix/Brilinta/Effient e-prescribed to patient's pharmacy: Yes/No__.                *No Aspirin/Plavix/Brilinta/Effient prescribed due to ___.     Hospital Course Summary:  This is 82 yo F w/ PMHx of HTN, recent NSTEMI s/p PCI 10/20/23 (Shockwave/DORITA x 2 pLAD), Meniere's disease, Vertigo, Anxiety d/o and h/o multiple falls, now returns to [facility] ED from [facility] facility c/o persistent nausea and multiple episodes of NBNB emesis since this morning. Pt also endorses constipation w/ last BM 1 week ago. She denies any abdominal pain, fevers or sick contact at [facility]. She also reported DAPT compliance and denies any CP, palpitations, dizziness, syncope, diaphoresis, fatigue, LE edema, COOLEY, orthopnea, or PND. Patient was diagnosed with Constipation, with subsequent bowel movements upon administration of tap water enema and bowel regimen. Patient is hemodynamically and symptomatically clear for discharge.       Problem-List:   #Constipation  Patient presented from [facility] facility with complaints of persistent nausea and multiple episodes of nonbloody, nonbilious emesis starting 10/26/23. Patient also reported constipation with her most recent last bowel movement 1 week ago. She was reported to have bowel movements on previous admission but reports were not consistent giving periods of waxing and waning alertness during that admission. She denied any abdominal pain, fevers or sick contact at [facility]. She also reports DAPT compliance, and denied any chest pain or endorsed any cardiac signs indicating cardiac related events. Important labs included: hsTroponin 212, CK/CKMB negative, Lactate negative, BUN 31, Cr 0.79, WBC 13.6 with left shift, , ALT 51, AST 36, with an unremarkable chest X-ray. EKG revealed normal sinus rhythm, and overall non ischemic. In the ED she was given 1L NS bolus, reglan 10mg IVp, and Pantoprazole 40mg IVp. She was started on a bowel regimen with subsequent tap water enema due to fecal impaction also seen on abdominal X-ray, with subsequent multiple bowel movements and was hemodynamically dynamic. Currently denies any chest pain, palpitations, dizziness, syncope, diaphoresis, fatigue, lower extremity edema, dyspnea on exertion, orthopnea, or PND, and medically clear for discharge with Cardiology follow ups as planned.   Plan: Followup per previous Cardiology scheduled appointment, Miralax 17g PO BID, Senna tablets qhs.      PHYSICAL EXAM  General: well-groomed; no distress  EENT: PERRL; EOMI; conjunctiva clear  Neurological:cranial nerves II-XII intact; sensation intact. Normal affect; alert and oriented x3; normal behavior  Cardiovascular:regular rate and rhythm; S1 S2 present; no gallops; no rub; no murmur; no JVD; no pedal edema  Respiratory:clear to auscultation bilaterally; no wheezes; no rales; no rhonchi; no respiratory distress; respirations non-labored  Gastrointestinal:soft; nontender; nondistended; normal active bowel sounds; no guarding; no masses palpable  Extremities:warm and dry; color normal; no rashes; no ulcers

## 2023-10-27 NOTE — PROGRESS NOTE ADULT - SUBJECTIVE AND OBJECTIVE BOX
***INCOMPLETE NOTE*** OVERNIGHT EVENTS: none to report    SUBJECTIVE: Patient seen and examined at bedside. Found stable and in NAD. Reports sharp pain on left anterior chest that worsens with respiration and/or palpation with no radiation. Has not had BM s/p bowel regimen; last BM 5 days ago which she described as hard stool; does not use opioids at home. Does not report N/V, SOB, COOLEY, orthopnea, oppressive chest pain, or palpitations.    Vital Signs Last 12 Hrs  T(F): 98.4 (10-27-23 @ 06:27), Max: 98.6 (10-26-23 @ 23:24)  HR: 85 (10-27-23 @ 06:27) (85 - 91)  BP: 123/57 (10-27-23 @ 06:27) (112/76 - 123/57)  BP(mean): --  RR: 17 (10-27-23 @ 06:27) (17 - 20)  SpO2: 98% (10-27-23 @ 06:27) (96% - 99%)  I&O's Summary    26 Oct 2023 07:01  -  27 Oct 2023 07:00  --------------------------------------------------------  IN: 0 mL / OUT: 500 mL / NET: -500 mL    27 Oct 2023 07:01  -  27 Oct 2023 10:10  --------------------------------------------------------  IN: 0 mL / OUT: 0 mL / NET: 0 mL        PHYSICAL EXAM:  Constitutional: NAD, comfortable in bed.  HEENT: NC/AT, PERRLA, EOMI, no conjunctival pallor or scleral icterus, MMM  Neck: Supple, no JVD  Respiratory: CTA B/L. No w/r/r.   Cardiovascular: RRR, normal S1 and S2, no m/r/g.   Gastrointestinal: +BS, soft NTND, no guarding or rebound tenderness, no palpable masses   MSK: sharp pain on left anterior chest worsens on deep inspiration and palpation of CC joints  Extremities: wwp; no cyanosis, clubbing or edema.   Vascular: Pulses equal and strong throughout.   Neurological: AAOx3, no CN deficits, strength and sensation intact throughout.   Skin: No gross skin abnormalities or rashes        LABS:                        11.1   8.75  )-----------( 273      ( 27 Oct 2023 08:11 )             33.2     10-27    139  |  102  |  21  ----------------------------<  92  3.4<L>   |  26  |  0.70    Ca    9.0      27 Oct 2023 08:11    TPro  6.1  /  Alb  3.5  /  TBili  0.5  /  DBili  x   /  AST  27  /  ALT  40  /  AlkPhos  113  10-27    PT/INR - ( 26 Oct 2023 19:47 )   PT: 11.3 sec;   INR: 0.99          PTT - ( 27 Oct 2023 08:14 )  PTT:40.6 sec  Urinalysis Basic - ( 27 Oct 2023 08:11 )    Color: x / Appearance: x / SG: x / pH: x  Gluc: 92 mg/dL / Ketone: x  / Bili: x / Urobili: x   Blood: x / Protein: x / Nitrite: x   Leuk Esterase: x / RBC: x / WBC x   Sq Epi: x / Non Sq Epi: x / Bacteria: x          RADIOLOGY & ADDITIONAL TESTS:    MEDICATIONS  (STANDING):  aspirin enteric coated 81 milliGRAM(s) Oral daily  atorvastatin 40 milliGRAM(s) Oral at bedtime  buPROPion XL (24-Hour) . 300 milliGRAM(s) Oral daily  clopidogrel Tablet 75 milliGRAM(s) Oral daily  heparin  Infusion 700 Unit(s)/Hr (7 mL/Hr) IV Continuous <Continuous>  influenza  Vaccine (HIGH DOSE) 0.7 milliLiter(s) IntraMuscular once  metoprolol succinate ER 25 milliGRAM(s) Oral daily  pantoprazole    Tablet 40 milliGRAM(s) Oral before breakfast  polyethylene glycol 3350 17 Gram(s) Oral every 12 hours  potassium chloride   Powder 40 milliEquivalent(s) Oral every 4 hours  senna 2 Tablet(s) Oral at bedtime  sertraline 25 milliGRAM(s) Oral daily  topiramate 25 milliGRAM(s) Oral daily    MEDICATIONS  (PRN):

## 2023-10-27 NOTE — DIETITIAN INITIAL EVALUATION ADULT - OTHER CALCULATIONS
5'3''  pounds+-10%; %HWX=809  IBW used to calculate energy needs due to pt's current body weight exceeding 120% of IBW  Adjust for age BMI and current medical issues

## 2023-10-27 NOTE — DISCHARGE NOTE PROVIDER - NSDCFUSCHEDAPPT_GEN_ALL_CORE_FT
Jose Calle Physician [facility]  HEART[facility]ASC [address]  Scheduled Appointment: 11/08/2023

## 2023-10-27 NOTE — DIETITIAN INITIAL EVALUATION ADULT - PERTINENT LABORATORY DATA
10-27    139  |  102  |  21  ----------------------------<  92  3.4<L>   |  26  |  0.70    Ca    9.0      27 Oct 2023 08:11    TPro  6.1  /  Alb  3.5  /  TBili  0.5  /  DBili  x   /  AST  27  /  ALT  40  /  AlkPhos  113  10-27  A1C with Estimated Average Glucose Result: 4.7 % (10-17-23 @ 05:30)

## 2023-10-27 NOTE — DISCHARGE NOTE PROVIDER - NSDCHHCONTACT_GEN_ALL_CORE_FT
Left without being seen (saw a nurse but never saw a physician or midlevel provider)
As certified below, I, or a nurse practitioner or physician assistant working with me, had a face-to-face encounter that meets the physician face-to-face encounter requirements.

## 2023-10-27 NOTE — DISCHARGE NOTE NURSING/CASE MANAGEMENT/SOCIAL WORK - NSDCPEFALRISK_GEN_ALL_CORE
For information on Fall & Injury Prevention, visit: [email or web address] OR  [email or web address] OR  https://www.cdc.gov/steadi/patient.html

## 2023-10-27 NOTE — DIETITIAN INITIAL EVALUATION ADULT - ADD RECOMMEND
1. Monitor PO intake/appetite, GI distress, diet tolerance, labs, weights.   2. If PO intake remains at/below 50%, consider to Recommend to liberalize diet in attempts to promote PO intake (liberalizing diet will allow for more menu options).   3. MVI.  4. Optimize bowel regime PRN.  5. Honor pt food preferences as able.  6. RD to remain available for additional nutrition interventions as needed.

## 2023-10-27 NOTE — PROGRESS NOTE ADULT - ASSESSMENT
83F, DNR/DNI (rescinded until 11/20/23), w/ PMHx of HTN, recent NSTEMI s/p PCI 10/20/23 (Shockwave/DORITA x 2 pLAD), Meniere's disease, Vertigo, Anxiety d/o and h/o multiple falls, now returns to [facility] ED from [facility] facility c/o persistent nausea, NBNB emesis and constipation,  and pt admitted to cardiac telemetry for r/o ACS.

## 2023-10-27 NOTE — PROGRESS NOTE ADULT - PROBLEM SELECTOR PLAN 1
presents w/ persistent nausea and multiple episodes of NBNB emesis since this AM and last BM 5 days ago; pt currently afebrile and non toxic appearing  - WBC 13 w/ left shift and lactate negative; WBC wnl on 10/27  - Alk Phos 129 (prev 85), ALT 51 (prev 45), AST wnl. Continue to trend LFTs  - Abd x-ray remarkable for rectal and colonic impaction; s/p miralax and senna w/o BM; will administer warm water enema presents w/ persistent nausea and multiple episodes of NBNB emesis since this AM and last BM 5 days ago; pt currently afebrile and non toxic appearing  - WBC 13 w/ left shift and lactate negative; WBC wnl on 10/27  - Alk Phos 129 (prev 85), ALT 51 (prev 45), AST wnl. Continue to trend LFTs  - Abd x-ray remarkable for rectal and colonic impaction; s/p miralax and senna w/o BM; will administer warm water enema and monitor BM.

## 2023-10-27 NOTE — PROVIDER CONTACT NOTE (OTHER) - SITUATION
Patient has MOLST form in physical chart not signed by physician. Recent admission cardiac cath done with DNR/DNI status rescinded.

## 2023-10-27 NOTE — PROVIDER CONTACT NOTE (OTHER) - ASSESSMENT
Pt in no acute distress, no c/o CP, VSS, A&O x4. Pt verbalizes understanding that she is currently full code.

## 2023-10-27 NOTE — PROGRESS NOTE ADULT - PROBLEM SELECTOR PLAN 4
appropriate mood and affect  - Continue Buproprion 300mg QD, Sertraline 25mg QD and Topiramate 25mg QD    F: None  E: Replete if K<4 or Mag<2  N: DASH Diet  GIppx: Pantoprazole  VTEppx: Heparin gtt  Dispo: Cardiac tele  PT: pending eval back to [facility]

## 2023-10-27 NOTE — DISCHARGE NOTE PROVIDER - DETAILS OF MALNUTRITION DIAGNOSIS/DIAGNOSES
This patient has been assessed with a concern for Malnutrition and was treated during this hospitalization for the following Nutrition diagnosis/diagnoses:     -  10/27/2023: Underweight (BMI < 19)

## 2023-10-27 NOTE — DISCHARGE NOTE PROVIDER - CARE PROVIDER_API CALL
Jose Calle  Cardiology  [address] [place] [address]  Phone: [phone]  Fax: [phone]  Scheduled Appointment: 11/08/2023 09:20 AM

## 2023-10-27 NOTE — DIETITIAN INITIAL EVALUATION ADULT - PROBLEM SELECTOR PLAN 1
presents w/ persistent nausea and multiple episodes of NBNB emesis since this AM and last BM 1 week ago; pt currently afebrile and non toxic appearing  - WBC 13 w/ left shift and lactate negative  - Alk Phos 129 (prev 85), ALT 51 (prev 45), AST wnl. Continue to trend LFTs  - f/u Abd Xray  - Start Miralax, Senna, and Pantoprazole

## 2023-10-27 NOTE — DISCHARGE NOTE PROVIDER - NSDCMRMEDTOKEN_GEN_ALL_CORE_FT
acetaminophen 325 mg oral tablet: 3 tab(s) orally every 12 hours As needed Mild Pain (1 - 3), Moderate Pain (4 - 6)  aspirin 81 mg oral delayed release tablet: 1 tab(s) orally once a day  atorvastatin 40 mg oral tablet: 1 tab(s) orally once a day (at bedtime)  buPROPion 300 mg/24 hours (XL) oral tablet, extended release: 1 tab(s) orally once a day  clopidogrel 75 mg oral tablet: 1 tab(s) orally once a day  metoprolol succinate 25 mg oral tablet, extended release: 1 tab(s) orally once a day  polyethylene glycol 3350 oral powder for reconstitution: 17 gram(s) orally every 12 hours  senna leaf extract oral tablet: 2 tab(s) orally once a day (at bedtime)  sertraline 25 mg oral tablet: 1 tab(s) orally once a day  topiramate 25 mg oral tablet: 1 tab(s) orally once a day  triamterene-hydrochlorothiazide 37.5 mg-25 mg oral tablet: 1 tab(s) orally once a day

## 2023-10-27 NOTE — DIETITIAN INITIAL EVALUATION ADULT - OTHER INFO
83F, DNR/DNI (rescinded until 11/20/23), PMHx HTN, recent NSTEMI s/p PCI 10/20/23 (Shockwave/DORITA x 2 pLAD), Meniere's disease, Vertigo, Anxiety d/o and h/o multiple falls, now returns to [facility] ED from [facility] facility c/o persistent nausea and multiple episodes of NBNB emesis, also endorses constipation: last BM 1 week ago. Pt now admitted to cardiac tele for r/o ACS.    Pt seen this AM on 5UR. Pt agreeable for RD visit and interview despite being on commode during time of visit. Pt admitted i/s/o N/V/C. Reports being constipated x weeks PTA. Pt now agreeable for enema use - pending per IDR. Ordered for PROTONIX MIRALAX and senna currently. Pt had been Consulted to be seen d/t MST score 2 or greater. Per patient profile Unsure (2) about wt loss. Admit wt of 103 pounds and BMI 18.3 noted. Pt did not provide wt hx during visit. During Recent 10/2023 [facility] admit noted wt of 103 pounds which would suggest pt has remained wt stable over the last month. Unable to preform NFPE today as pt seen on commode during time of visit. Pt ordered for DASH TLC diet. Reports not feeling hungry and limited PO PTA as well, (fair intake, 50%). Pt does not take PO supplements PTA ( / does not want at this time) as she does not like sugar or flour. NKFA. No pain. Shahab 16. No Edema or pressure ulcers.   Please see below for nutritions recommendations.

## 2023-10-27 NOTE — PROGRESS NOTE ADULT - PROBLEM SELECTOR PLAN 2
recent NSTEMI 10/20/23 and reports DAPT compliance, pt remains CP free and HD stable  - hsTropT 212 (prior NSTEMI peaked at 259), CK/CKMB negative, f/u repeat CE  - EKG: NSR, non ischemic  - TTE 10/16/23:  normal LVEF, no significant valvular disease  - Cardiac Cath 10/20/23: IVUS guided Shockwave/DORITA x 2 to pLAD (90-95%); LM mild, D1 mild diffuse, LCx/LM1 mild diffuse, RCA mod diffuse, RPDA mild diffuse  - NPO after MN and consult Interventional cards in AM to review films  - Start Heparin gtt  - Continue ASA 81mg QD, Plavix 75mg QD, Lipitor 40mg HS and Toprol 25mg QD  - f/u P2Y12 in AM recent NSTEMI 10/20/23 and reports DAPT compliance, pt remains CP free and HD stable  - hsTropT 212 (prior NSTEMI peaked at 259), CK/CKMB negative; repeat trops downtrending at 170  - EKG: NSR, non ischemic  - TTE 10/16/23:  normal LVEF, no significant valvular disease  - Cardiac Cath 10/20/23: IVUS guided Shockwave/DORITA x 2 to pLAD (90-95%); LM mild, D1 mild diffuse, LCx/LM1 mild diffuse, RCA mod diffuse, RPDA mild diffuse  - Start Heparin gtt  - Continue ASA 81mg QD, Plavix 75mg QD, Lipitor 40mg HS and Toprol 25mg QD  - f/u P2Y12 in AM Recent NSTEMI 10/20/23 and reports DAPT compliance; complains of left anterior chest pain sharp in nature and aggravated by inspiration and palpation of CC joint likely 2/2 costochondritis. HD stable with no signs of HF.  - hsTropT 212 (prior NSTEMI peaked at 259), CK/CKMB negative; repeat trops downtrending at 170  - EKG: NSR, non ischemic  - TTE 10/16/23:  normal LVEF, no significant valvular disease  - Cardiac Cath 10/20/23: IVUS guided Shockwave/DORITA x 2 to pLAD (90-95%); LM mild, D1 mild diffuse, LCx/LM1 mild diffuse, RCA mod diffuse, RPDA mild diffuse  - D/C Heparin gtt  - P2Y12 level 165; adequate antiplatelet coverage  - Continue ASA 81mg QD, Plavix 75mg QD, Lipitor 40mg HS and Toprol 25mg QD

## 2023-10-27 NOTE — DISCHARGE NOTE NURSING/CASE MANAGEMENT/SOCIAL WORK - PATIENT PORTAL LINK FT
You can access the FollowMyHealth Patient Portal offered by [facility] by registering at the following website: [email or web address]. By joining [employer]’s FollowMyHealth portal, you will also be able to view your health information using other applications (apps) compatible with our system.

## 2023-10-31 ENCOUNTER — TRANSCRIPTION ENCOUNTER (OUTPATIENT)
Age: 83
End: 2023-10-31

## 2023-11-01 DIAGNOSIS — H81.03 MENIERE'S DISEASE, BILATERAL: ICD-10-CM

## 2023-11-01 DIAGNOSIS — E44.0 MODERATE PROTEIN-CALORIE MALNUTRITION: ICD-10-CM

## 2023-11-01 DIAGNOSIS — I21.4 NON-ST ELEVATION (NSTEMI) MYOCARDIAL INFARCTION: ICD-10-CM

## 2023-11-01 DIAGNOSIS — R54 AGE-RELATED PHYSICAL DEBILITY: ICD-10-CM

## 2023-11-01 DIAGNOSIS — I25.84 CORONARY ATHEROSCLEROSIS DUE TO CALCIFIED CORONARY LESION: ICD-10-CM

## 2023-11-01 DIAGNOSIS — R29.6 REPEATED FALLS: ICD-10-CM

## 2023-11-01 DIAGNOSIS — F41.9 ANXIETY DISORDER, UNSPECIFIED: ICD-10-CM

## 2023-11-07 ENCOUNTER — TRANSCRIPTION ENCOUNTER (OUTPATIENT)
Age: 83
End: 2023-11-07

## 2023-11-08 ENCOUNTER — NON-APPOINTMENT (OUTPATIENT)
Age: 83
End: 2023-11-08

## 2023-11-08 ENCOUNTER — APPOINTMENT (OUTPATIENT)
Dept: HEART AND VASCULAR | Facility: CLINIC | Age: 83
End: 2023-11-08
Payer: MEDICARE

## 2023-11-08 VITALS
SYSTOLIC BLOOD PRESSURE: 100 MMHG | BODY MASS INDEX: 17.01 KG/M2 | WEIGHT: 96 LBS | OXYGEN SATURATION: 98 % | HEART RATE: 74 BPM | HEIGHT: 63 IN | DIASTOLIC BLOOD PRESSURE: 60 MMHG | TEMPERATURE: 98.2 F

## 2023-11-08 DIAGNOSIS — E78.5 HYPERLIPIDEMIA, UNSPECIFIED: ICD-10-CM

## 2023-11-08 DIAGNOSIS — I25.10 ATHEROSCLEROTIC HEART DISEASE OF NATIVE CORONARY ARTERY W/OUT ANGINA PECTORIS: ICD-10-CM

## 2023-11-08 DIAGNOSIS — Z80.9 FAMILY HISTORY OF MALIGNANT NEOPLASM, UNSPECIFIED: ICD-10-CM

## 2023-11-08 DIAGNOSIS — Z95.5 PRESENCE OF CORONARY ANGIOPLASTY IMPLANT AND GRAFT: ICD-10-CM

## 2023-11-08 PROBLEM — I10 ESSENTIAL (PRIMARY) HYPERTENSION: Chronic | Status: ACTIVE | Noted: 2023-10-26

## 2023-11-08 PROBLEM — I21.4 NON-ST ELEVATION (NSTEMI) MYOCARDIAL INFARCTION: Chronic | Status: ACTIVE | Noted: 2023-10-26

## 2023-11-08 PROCEDURE — 99214 OFFICE O/P EST MOD 30 MIN: CPT

## 2023-11-08 PROCEDURE — 93000 ELECTROCARDIOGRAM COMPLETE: CPT

## 2023-11-08 RX ORDER — TOPIRAMATE 25 MG/1
25 TABLET, FILM COATED ORAL
Refills: 0 | Status: ACTIVE | COMMUNITY

## 2023-11-08 RX ORDER — POLYETHYLENE GLYCOL 3350 17 G/17G
17 POWDER, FOR SOLUTION ORAL
Refills: 0 | Status: ACTIVE | COMMUNITY

## 2023-11-08 RX ORDER — SENNOSIDES 8.6 MG TABLETS 8.6 MG/1
TABLET ORAL
Refills: 0 | Status: ACTIVE | COMMUNITY

## 2023-11-08 RX ORDER — ASPIRIN ENTERIC COATED TABLETS 81 MG 81 MG/1
81 TABLET, DELAYED RELEASE ORAL DAILY
Refills: 0 | Status: ACTIVE | COMMUNITY

## 2023-11-08 RX ORDER — SERTRALINE 25 MG/1
25 TABLET, FILM COATED ORAL DAILY
Refills: 0 | Status: ACTIVE | COMMUNITY

## 2023-11-13 PROCEDURE — 36415 COLL VENOUS BLD VENIPUNCTURE: CPT

## 2023-11-13 PROCEDURE — 86850 RBC ANTIBODY SCREEN: CPT

## 2023-11-13 PROCEDURE — 84100 ASSAY OF PHOSPHORUS: CPT

## 2023-11-13 PROCEDURE — 97165 OT EVAL LOW COMPLEX 30 MIN: CPT

## 2023-11-13 PROCEDURE — 86140 C-REACTIVE PROTEIN: CPT

## 2023-11-13 PROCEDURE — C1761: CPT

## 2023-11-13 PROCEDURE — 75574 CT ANGIO HRT W/3D IMAGE: CPT

## 2023-11-13 PROCEDURE — 86901 BLOOD TYPING SEROLOGIC RH(D): CPT

## 2023-11-13 PROCEDURE — 86900 BLOOD TYPING SEROLOGIC ABO: CPT

## 2023-11-13 PROCEDURE — 97161 PT EVAL LOW COMPLEX 20 MIN: CPT

## 2023-11-13 PROCEDURE — 83735 ASSAY OF MAGNESIUM: CPT

## 2023-11-13 PROCEDURE — 86141 C-REACTIVE PROTEIN HS: CPT

## 2023-11-13 PROCEDURE — 71275 CT ANGIOGRAPHY CHEST: CPT | Mod: MC

## 2023-11-13 PROCEDURE — C1725: CPT

## 2023-11-13 PROCEDURE — C1874: CPT

## 2023-11-13 PROCEDURE — C1887: CPT

## 2023-11-13 PROCEDURE — 80076 HEPATIC FUNCTION PANEL: CPT

## 2023-11-13 PROCEDURE — 83880 ASSAY OF NATRIURETIC PEPTIDE: CPT

## 2023-11-13 PROCEDURE — 93005 ELECTROCARDIOGRAM TRACING: CPT

## 2023-11-13 PROCEDURE — 82553 CREATINE MB FRACTION: CPT

## 2023-11-13 PROCEDURE — C1889: CPT

## 2023-11-13 PROCEDURE — 81003 URINALYSIS AUTO W/O SCOPE: CPT

## 2023-11-13 PROCEDURE — C1769: CPT

## 2023-11-13 PROCEDURE — 87086 URINE CULTURE/COLONY COUNT: CPT

## 2023-11-13 PROCEDURE — 85610 PROTHROMBIN TIME: CPT

## 2023-11-13 PROCEDURE — 97116 GAIT TRAINING THERAPY: CPT

## 2023-11-13 PROCEDURE — 82550 ASSAY OF CK (CPK): CPT

## 2023-11-13 PROCEDURE — 84484 ASSAY OF TROPONIN QUANT: CPT

## 2023-11-13 PROCEDURE — 80053 COMPREHEN METABOLIC PANEL: CPT

## 2023-11-13 PROCEDURE — 80048 BASIC METABOLIC PNL TOTAL CA: CPT

## 2023-11-13 PROCEDURE — 93306 TTE W/DOPPLER COMPLETE: CPT

## 2023-11-13 PROCEDURE — 74174 CTA ABD&PLVS W/CONTRAST: CPT | Mod: MC

## 2023-11-13 PROCEDURE — 82803 BLOOD GASES ANY COMBINATION: CPT

## 2023-11-13 PROCEDURE — C1894: CPT

## 2023-11-13 PROCEDURE — 99291 CRITICAL CARE FIRST HOUR: CPT | Mod: 25

## 2023-11-13 PROCEDURE — 85027 COMPLETE CBC AUTOMATED: CPT

## 2023-11-13 PROCEDURE — C1753: CPT

## 2023-11-13 PROCEDURE — 85652 RBC SED RATE AUTOMATED: CPT

## 2023-11-13 PROCEDURE — 87635 SARS-COV-2 COVID-19 AMP PRB: CPT

## 2023-11-13 PROCEDURE — 85025 COMPLETE CBC W/AUTO DIFF WBC: CPT

## 2023-11-13 PROCEDURE — 85730 THROMBOPLASTIN TIME PARTIAL: CPT

## 2023-11-13 PROCEDURE — 80061 LIPID PANEL: CPT

## 2023-11-13 PROCEDURE — 96374 THER/PROPH/DIAG INJ IV PUSH: CPT

## 2023-11-13 PROCEDURE — 97530 THERAPEUTIC ACTIVITIES: CPT

## 2023-11-13 PROCEDURE — 83036 HEMOGLOBIN GLYCOSYLATED A1C: CPT

## 2023-11-13 PROCEDURE — 85347 COAGULATION TIME ACTIVATED: CPT

## 2023-11-13 PROCEDURE — 71045 X-RAY EXAM CHEST 1 VIEW: CPT

## 2023-11-14 ENCOUNTER — TRANSCRIPTION ENCOUNTER (OUTPATIENT)
Age: 83
End: 2023-11-14
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
## 2023-11-21 ENCOUNTER — TRANSCRIPTION ENCOUNTER (OUTPATIENT)
Age: 83
End: 2023-11-21

## 2023-11-22 ENCOUNTER — APPOINTMENT (OUTPATIENT)
Dept: HEART AND VASCULAR | Facility: CLINIC | Age: 83
End: 2023-11-22

## 2023-11-27 ENCOUNTER — EMERGENCY (EMERGENCY)
Facility: HOSPITAL | Age: 83
LOS: 1 days | Discharge: HOME | End: 2023-11-27
Attending: EMERGENCY MEDICINE | Admitting: EMERGENCY MEDICINE
Payer: MEDICARE

## 2023-11-27 VITALS
HEART RATE: 74 BPM | TEMPERATURE: 98 F | RESPIRATION RATE: 18 BRPM | HEIGHT: 63 IN | SYSTOLIC BLOOD PRESSURE: 125 MMHG | WEIGHT: 100.09 LBS | OXYGEN SATURATION: 98 % | DIASTOLIC BLOOD PRESSURE: 69 MMHG

## 2023-11-27 DIAGNOSIS — Z87.81 PERSONAL HISTORY OF (HEALED) TRAUMATIC FRACTURE: Chronic | ICD-10-CM

## 2023-11-27 DIAGNOSIS — Z98.890 OTHER SPECIFIED POSTPROCEDURAL STATES: Chronic | ICD-10-CM

## 2023-11-27 LAB
ALBUMIN SERPL ELPH-MCNC: 3.6 G/DL — SIGNIFICANT CHANGE UP (ref 3.3–5)
ALBUMIN SERPL ELPH-MCNC: 3.6 G/DL — SIGNIFICANT CHANGE UP (ref 3.3–5)
ALP SERPL-CCNC: 98 U/L — SIGNIFICANT CHANGE UP (ref 40–120)
ALP SERPL-CCNC: 98 U/L — SIGNIFICANT CHANGE UP (ref 40–120)
ALT FLD-CCNC: 18 U/L — SIGNIFICANT CHANGE UP (ref 10–45)
ALT FLD-CCNC: 18 U/L — SIGNIFICANT CHANGE UP (ref 10–45)
ANION GAP SERPL CALC-SCNC: 10 MMOL/L — SIGNIFICANT CHANGE UP (ref 5–17)
ANION GAP SERPL CALC-SCNC: 10 MMOL/L — SIGNIFICANT CHANGE UP (ref 5–17)
APTT BLD: 28.5 SEC — SIGNIFICANT CHANGE UP (ref 24.5–35.6)
APTT BLD: 28.5 SEC — SIGNIFICANT CHANGE UP (ref 24.5–35.6)
AST SERPL-CCNC: 20 U/L — SIGNIFICANT CHANGE UP (ref 10–40)
AST SERPL-CCNC: 20 U/L — SIGNIFICANT CHANGE UP (ref 10–40)
BASOPHILS # BLD AUTO: 0.02 K/UL — SIGNIFICANT CHANGE UP (ref 0–0.2)
BASOPHILS # BLD AUTO: 0.02 K/UL — SIGNIFICANT CHANGE UP (ref 0–0.2)
BASOPHILS NFR BLD AUTO: 0.4 % — SIGNIFICANT CHANGE UP (ref 0–2)
BASOPHILS NFR BLD AUTO: 0.4 % — SIGNIFICANT CHANGE UP (ref 0–2)
BILIRUB SERPL-MCNC: 0.2 MG/DL — SIGNIFICANT CHANGE UP (ref 0.2–1.2)
BILIRUB SERPL-MCNC: 0.2 MG/DL — SIGNIFICANT CHANGE UP (ref 0.2–1.2)
BUN SERPL-MCNC: 18 MG/DL — SIGNIFICANT CHANGE UP (ref 7–23)
BUN SERPL-MCNC: 18 MG/DL — SIGNIFICANT CHANGE UP (ref 7–23)
CALCIUM SERPL-MCNC: 8.8 MG/DL — SIGNIFICANT CHANGE UP (ref 8.4–10.5)
CALCIUM SERPL-MCNC: 8.8 MG/DL — SIGNIFICANT CHANGE UP (ref 8.4–10.5)
CHLORIDE SERPL-SCNC: 104 MMOL/L — SIGNIFICANT CHANGE UP (ref 96–108)
CHLORIDE SERPL-SCNC: 104 MMOL/L — SIGNIFICANT CHANGE UP (ref 96–108)
CK SERPL-CCNC: 68 U/L — SIGNIFICANT CHANGE UP (ref 25–170)
CK SERPL-CCNC: 68 U/L — SIGNIFICANT CHANGE UP (ref 25–170)
CO2 SERPL-SCNC: 22 MMOL/L — SIGNIFICANT CHANGE UP (ref 22–31)
CO2 SERPL-SCNC: 22 MMOL/L — SIGNIFICANT CHANGE UP (ref 22–31)
CREAT SERPL-MCNC: 0.69 MG/DL — SIGNIFICANT CHANGE UP (ref 0.5–1.3)
CREAT SERPL-MCNC: 0.69 MG/DL — SIGNIFICANT CHANGE UP (ref 0.5–1.3)
EGFR: 85 ML/MIN/1.73M2 — SIGNIFICANT CHANGE UP
EGFR: 85 ML/MIN/1.73M2 — SIGNIFICANT CHANGE UP
EOSINOPHIL # BLD AUTO: 0.11 K/UL — SIGNIFICANT CHANGE UP (ref 0–0.5)
EOSINOPHIL # BLD AUTO: 0.11 K/UL — SIGNIFICANT CHANGE UP (ref 0–0.5)
EOSINOPHIL NFR BLD AUTO: 2 % — SIGNIFICANT CHANGE UP (ref 0–6)
EOSINOPHIL NFR BLD AUTO: 2 % — SIGNIFICANT CHANGE UP (ref 0–6)
GLUCOSE SERPL-MCNC: 98 MG/DL — SIGNIFICANT CHANGE UP (ref 70–99)
GLUCOSE SERPL-MCNC: 98 MG/DL — SIGNIFICANT CHANGE UP (ref 70–99)
HCT VFR BLD CALC: 31.3 % — LOW (ref 34.5–45)
HCT VFR BLD CALC: 31.3 % — LOW (ref 34.5–45)
HGB BLD-MCNC: 10.3 G/DL — LOW (ref 11.5–15.5)
HGB BLD-MCNC: 10.3 G/DL — LOW (ref 11.5–15.5)
IMM GRANULOCYTES NFR BLD AUTO: 0.5 % — SIGNIFICANT CHANGE UP (ref 0–0.9)
IMM GRANULOCYTES NFR BLD AUTO: 0.5 % — SIGNIFICANT CHANGE UP (ref 0–0.9)
INR BLD: 1.02 — SIGNIFICANT CHANGE UP (ref 0.85–1.18)
INR BLD: 1.02 — SIGNIFICANT CHANGE UP (ref 0.85–1.18)
LIDOCAIN IGE QN: 29 U/L — SIGNIFICANT CHANGE UP (ref 7–60)
LIDOCAIN IGE QN: 29 U/L — SIGNIFICANT CHANGE UP (ref 7–60)
LYMPHOCYTES # BLD AUTO: 1.17 K/UL — SIGNIFICANT CHANGE UP (ref 1–3.3)
LYMPHOCYTES # BLD AUTO: 1.17 K/UL — SIGNIFICANT CHANGE UP (ref 1–3.3)
LYMPHOCYTES # BLD AUTO: 21.3 % — SIGNIFICANT CHANGE UP (ref 13–44)
LYMPHOCYTES # BLD AUTO: 21.3 % — SIGNIFICANT CHANGE UP (ref 13–44)
MCHC RBC-ENTMCNC: 30.9 PG — SIGNIFICANT CHANGE UP (ref 27–34)
MCHC RBC-ENTMCNC: 30.9 PG — SIGNIFICANT CHANGE UP (ref 27–34)
MCHC RBC-ENTMCNC: 32.9 GM/DL — SIGNIFICANT CHANGE UP (ref 32–36)
MCHC RBC-ENTMCNC: 32.9 GM/DL — SIGNIFICANT CHANGE UP (ref 32–36)
MCV RBC AUTO: 94 FL — SIGNIFICANT CHANGE UP (ref 80–100)
MCV RBC AUTO: 94 FL — SIGNIFICANT CHANGE UP (ref 80–100)
MONOCYTES # BLD AUTO: 0.52 K/UL — SIGNIFICANT CHANGE UP (ref 0–0.9)
MONOCYTES # BLD AUTO: 0.52 K/UL — SIGNIFICANT CHANGE UP (ref 0–0.9)
MONOCYTES NFR BLD AUTO: 9.5 % — SIGNIFICANT CHANGE UP (ref 2–14)
MONOCYTES NFR BLD AUTO: 9.5 % — SIGNIFICANT CHANGE UP (ref 2–14)
NEUTROPHILS # BLD AUTO: 3.65 K/UL — SIGNIFICANT CHANGE UP (ref 1.8–7.4)
NEUTROPHILS # BLD AUTO: 3.65 K/UL — SIGNIFICANT CHANGE UP (ref 1.8–7.4)
NEUTROPHILS NFR BLD AUTO: 66.3 % — SIGNIFICANT CHANGE UP (ref 43–77)
NEUTROPHILS NFR BLD AUTO: 66.3 % — SIGNIFICANT CHANGE UP (ref 43–77)
NRBC # BLD: 0 /100 WBCS — SIGNIFICANT CHANGE UP (ref 0–0)
NRBC # BLD: 0 /100 WBCS — SIGNIFICANT CHANGE UP (ref 0–0)
PLATELET # BLD AUTO: 218 K/UL — SIGNIFICANT CHANGE UP (ref 150–400)
PLATELET # BLD AUTO: 218 K/UL — SIGNIFICANT CHANGE UP (ref 150–400)
POTASSIUM SERPL-MCNC: 3.6 MMOL/L — SIGNIFICANT CHANGE UP (ref 3.5–5.3)
POTASSIUM SERPL-MCNC: 3.6 MMOL/L — SIGNIFICANT CHANGE UP (ref 3.5–5.3)
POTASSIUM SERPL-SCNC: 3.6 MMOL/L — SIGNIFICANT CHANGE UP (ref 3.5–5.3)
POTASSIUM SERPL-SCNC: 3.6 MMOL/L — SIGNIFICANT CHANGE UP (ref 3.5–5.3)
PROT SERPL-MCNC: 5.7 G/DL — LOW (ref 6–8.3)
PROT SERPL-MCNC: 5.7 G/DL — LOW (ref 6–8.3)
PROTHROM AB SERPL-ACNC: 11.6 SEC — SIGNIFICANT CHANGE UP (ref 9.5–13)
PROTHROM AB SERPL-ACNC: 11.6 SEC — SIGNIFICANT CHANGE UP (ref 9.5–13)
RBC # BLD: 3.33 M/UL — LOW (ref 3.8–5.2)
RBC # BLD: 3.33 M/UL — LOW (ref 3.8–5.2)
RBC # FLD: 13.3 % — SIGNIFICANT CHANGE UP (ref 10.3–14.5)
RBC # FLD: 13.3 % — SIGNIFICANT CHANGE UP (ref 10.3–14.5)
SODIUM SERPL-SCNC: 136 MMOL/L — SIGNIFICANT CHANGE UP (ref 135–145)
SODIUM SERPL-SCNC: 136 MMOL/L — SIGNIFICANT CHANGE UP (ref 135–145)
WBC # BLD: 5.5 K/UL — SIGNIFICANT CHANGE UP (ref 3.8–10.5)
WBC # BLD: 5.5 K/UL — SIGNIFICANT CHANGE UP (ref 3.8–10.5)
WBC # FLD AUTO: 5.5 K/UL — SIGNIFICANT CHANGE UP (ref 3.8–10.5)
WBC # FLD AUTO: 5.5 K/UL — SIGNIFICANT CHANGE UP (ref 3.8–10.5)

## 2023-11-27 PROCEDURE — 99285 EMERGENCY DEPT VISIT HI MDM: CPT

## 2023-11-27 RX ORDER — SODIUM CHLORIDE 9 MG/ML
250 INJECTION INTRAMUSCULAR; INTRAVENOUS; SUBCUTANEOUS ONCE
Refills: 0 | Status: COMPLETED | OUTPATIENT
Start: 2023-11-27 | End: 2023-11-27

## 2023-11-27 RX ADMIN — SODIUM CHLORIDE 250 MILLILITER(S): 9 INJECTION INTRAMUSCULAR; INTRAVENOUS; SUBCUTANEOUS at 23:19

## 2023-11-27 NOTE — ED ADULT NURSE NOTE - CHIEF COMPLAINT QUOTE
Pt reports mech fall tonight. pt reports she was getting out of bed and "one leg gave out on me." Pt reports she hit R side of head on ground. Pt takes aspirin and plavix. pt denies any LOC. Pt denies any dizziness, no numbness/tingling/weakness

## 2023-11-27 NOTE — ED ADULT NURSE NOTE - NSFALLHARMRISKINTERV_ED_ALL_ED

## 2023-11-27 NOTE — ED ADULT TRIAGE NOTE - CHIEF COMPLAINT QUOTE
Pt reports mech fall tonight. pt reports she was getting out of bed and "one leg gave out on me." Pt reports she hit R side of head on ground. Pt takes aspirin and plavix. pt denies any LOC. Pt reports mech fall tonight. pt reports she was getting out of bed and "one leg gave out on me." Pt reports she hit R side of head on ground. Pt takes aspirin and plavix. pt denies any LOC. Pt denies any dizziness, no numbness/tingling/weakness

## 2023-11-27 NOTE — ED ADULT NURSE NOTE - OBJECTIVE STATEMENT
84 yo F c/o mechanical fall. Reports hitting her head and LLE. Denies LOC, headache, dizziness, syncope, blurry vision, neck pain, back pain, numbness/tingling, CP, SOB, NVD. Pt is on plavix. Alert and oriented, patient lives in rehab facility and uses wheelchair.

## 2023-11-27 NOTE — ED ADULT NURSE NOTE - PATIENT'S PREFERRED PRONOUN
Letter was sent.  It was normal.     If she is still having chest pain, she needs to be seen.  She was supposed to go to the [facility] yesterday but didn't because the wait was long.      She can see any available provider, including NPs/PAs,  if I don't have any openings.     
Patient contacted. Dr. Bethea's note conveyed. Pt verbalized understanding without further questions.  Patient states that she believes the chest paint was coming from her acid reflux.  She passed a lot of gas and is feeling much better.  Offered same day appointment with another provider but patient would like to wait to see Dr. Bowens in July or use the [facility] if symptoms returned.    
Patient would like a call regarding her results of her EKG on yesterday    Kesha can be reached at [phone]  
Please review ekg results and advise.   
Her/She

## 2023-11-27 NOTE — ED ADULT NURSE NOTE - GASTROINTESTINAL ASSESSMENT
24 hr events:  ON: POC wnl ADAM  10/3: lap appy    SUBJECTIVE:  Pt seen and examined by chief resident. Pt is doing well, resting comfortably on bed. Pain controlled. Diet tolerated. Ambulating out of bed. No nausea or vomiting. No complaints at this time.    Vital Signs Last 24 Hrs  T(C): 36.9 (04 Oct 2019 05:44), Max: 37 (03 Oct 2019 11:52)  T(F): 98.4 (04 Oct 2019 05:44), Max: 98.6 (03 Oct 2019 11:52)  HR: 64 (04 Oct 2019 05:44) (54 - 78)  BP: 97/60 (04 Oct 2019 05:44) (96/63 - 105/75)  BP(mean): 77 (03 Oct 2019 22:03) (71 - 82)  RR: 18 (04 Oct 2019 05:44) (11 - 18)  SpO2: 98% (04 Oct 2019 05:44) (97% - 100%)    Physical Exam:  General: NAD  Pulmonary: Nonlabored breathing, no respiratory distress  Cardiovascular: NSR  Abdominal: soft, NT/ND, incisions c/d/i  Extremities: WWP,   Neuro: A/O x3,      I&O's Summary    03 Oct 2019 07:01  -  04 Oct 2019 07:00  --------------------------------------------------------  IN: 780 mL / OUT: 410 mL / NET: 370 mL        LABS:                        11.8   7.53  )-----------( 339      ( 04 Oct 2019 06:00 )             36.3     10-04    139  |  106  |  10  ----------------------------<  129<H>  4.9   |  25  |  0.84    Ca    8.4      04 Oct 2019 06:00  Phos  3.4     10-04  Mg     2.0     10-04    TPro  7.0  /  Alb  4.2  /  TBili  0.9  /  DBili  x   /  AST  15  /  ALT  8<L>  /  AlkPhos  53  10-03    PT/INR - ( 03 Oct 2019 16:29 )   PT: 12.5 sec;   INR: 1.10          PTT - ( 03 Oct 2019 16:29 )  PTT:29.4 sec  Urinalysis Basic - ( 03 Oct 2019 13:03 )    Color: Yellow / Appearance: Clear / SG: >=1.030 / pH: x  Gluc: x / Ketone: Trace mg/dL  / Bili: Small / Urobili: 1.0 E.U./dL   Blood: x / Protein: 30 mg/dL / Nitrite: POSITIVE   Leuk Esterase: Trace / RBC: < 5 /HPF / WBC > 10 /HPF   Sq Epi: x / Non Sq Epi: 5-10 /HPF / Bacteria: Present /HPF          LIVER FUNCTIONS - ( 03 Oct 2019 13:03 )  Alb: 4.2 g/dL / Pro: 7.0 g/dL / ALK PHOS: 53 U/L / ALT: 8 U/L / AST: 15 U/L / GGT: x - - -

## 2023-11-28 VITALS
TEMPERATURE: 98 F | DIASTOLIC BLOOD PRESSURE: 72 MMHG | SYSTOLIC BLOOD PRESSURE: 132 MMHG | OXYGEN SATURATION: 97 % | HEART RATE: 88 BPM | RESPIRATION RATE: 17 BRPM

## 2023-11-28 LAB
APPEARANCE UR: CLEAR — SIGNIFICANT CHANGE UP
APPEARANCE UR: CLEAR — SIGNIFICANT CHANGE UP
BACTERIA # UR AUTO: NEGATIVE /HPF — SIGNIFICANT CHANGE UP
BACTERIA # UR AUTO: NEGATIVE /HPF — SIGNIFICANT CHANGE UP
BILIRUB UR-MCNC: NEGATIVE — SIGNIFICANT CHANGE UP
BILIRUB UR-MCNC: NEGATIVE — SIGNIFICANT CHANGE UP
BLD GP AB SCN SERPL QL: NEGATIVE — SIGNIFICANT CHANGE UP
BLD GP AB SCN SERPL QL: NEGATIVE — SIGNIFICANT CHANGE UP
CAST: 1 /LPF — SIGNIFICANT CHANGE UP (ref 0–4)
CAST: 1 /LPF — SIGNIFICANT CHANGE UP (ref 0–4)
COLOR SPEC: YELLOW — SIGNIFICANT CHANGE UP
COLOR SPEC: YELLOW — SIGNIFICANT CHANGE UP
DIFF PNL FLD: NEGATIVE — SIGNIFICANT CHANGE UP
DIFF PNL FLD: NEGATIVE — SIGNIFICANT CHANGE UP
GLUCOSE UR QL: NEGATIVE MG/DL — SIGNIFICANT CHANGE UP
GLUCOSE UR QL: NEGATIVE MG/DL — SIGNIFICANT CHANGE UP
KETONES UR-MCNC: NEGATIVE MG/DL — SIGNIFICANT CHANGE UP
KETONES UR-MCNC: NEGATIVE MG/DL — SIGNIFICANT CHANGE UP
LEUKOCYTE ESTERASE UR-ACNC: ABNORMAL
LEUKOCYTE ESTERASE UR-ACNC: ABNORMAL
NITRITE UR-MCNC: NEGATIVE — SIGNIFICANT CHANGE UP
NITRITE UR-MCNC: NEGATIVE — SIGNIFICANT CHANGE UP
PH UR: 6 — SIGNIFICANT CHANGE UP (ref 5–8)
PH UR: 6 — SIGNIFICANT CHANGE UP (ref 5–8)
PROT UR-MCNC: NEGATIVE MG/DL — SIGNIFICANT CHANGE UP
PROT UR-MCNC: NEGATIVE MG/DL — SIGNIFICANT CHANGE UP
RBC CASTS # UR COMP ASSIST: 1 /HPF — SIGNIFICANT CHANGE UP (ref 0–4)
RBC CASTS # UR COMP ASSIST: 1 /HPF — SIGNIFICANT CHANGE UP (ref 0–4)
RH IG SCN BLD-IMP: NEGATIVE — SIGNIFICANT CHANGE UP
RH IG SCN BLD-IMP: NEGATIVE — SIGNIFICANT CHANGE UP
SP GR SPEC: 1.02 — SIGNIFICANT CHANGE UP (ref 1–1.03)
SP GR SPEC: 1.02 — SIGNIFICANT CHANGE UP (ref 1–1.03)
SQUAMOUS # UR AUTO: 0 /HPF — SIGNIFICANT CHANGE UP (ref 0–5)
SQUAMOUS # UR AUTO: 0 /HPF — SIGNIFICANT CHANGE UP (ref 0–5)
UROBILINOGEN FLD QL: 0.2 MG/DL — SIGNIFICANT CHANGE UP (ref 0.2–1)
UROBILINOGEN FLD QL: 0.2 MG/DL — SIGNIFICANT CHANGE UP (ref 0.2–1)
WBC UR QL: 2 /HPF — SIGNIFICANT CHANGE UP (ref 0–5)
WBC UR QL: 2 /HPF — SIGNIFICANT CHANGE UP (ref 0–5)

## 2023-11-28 PROCEDURE — 86901 BLOOD TYPING SEROLOGIC RH(D): CPT

## 2023-11-28 PROCEDURE — 73502 X-RAY EXAM HIP UNI 2-3 VIEWS: CPT

## 2023-11-28 PROCEDURE — 86900 BLOOD TYPING SEROLOGIC ABO: CPT

## 2023-11-28 PROCEDURE — 72125 CT NECK SPINE W/O DYE: CPT | Mod: MA

## 2023-11-28 PROCEDURE — 81001 URINALYSIS AUTO W/SCOPE: CPT

## 2023-11-28 PROCEDURE — 71045 X-RAY EXAM CHEST 1 VIEW: CPT

## 2023-11-28 PROCEDURE — 71045 X-RAY EXAM CHEST 1 VIEW: CPT | Mod: 26

## 2023-11-28 PROCEDURE — 84484 ASSAY OF TROPONIN QUANT: CPT

## 2023-11-28 PROCEDURE — 73590 X-RAY EXAM OF LOWER LEG: CPT

## 2023-11-28 PROCEDURE — 96360 HYDRATION IV INFUSION INIT: CPT

## 2023-11-28 PROCEDURE — 85610 PROTHROMBIN TIME: CPT

## 2023-11-28 PROCEDURE — 73590 X-RAY EXAM OF LOWER LEG: CPT | Mod: 26,LT

## 2023-11-28 PROCEDURE — 70450 CT HEAD/BRAIN W/O DYE: CPT | Mod: MA

## 2023-11-28 PROCEDURE — 90715 TDAP VACCINE 7 YRS/> IM: CPT

## 2023-11-28 PROCEDURE — 99285 EMERGENCY DEPT VISIT HI MDM: CPT | Mod: 25

## 2023-11-28 PROCEDURE — 73560 X-RAY EXAM OF KNEE 1 OR 2: CPT

## 2023-11-28 PROCEDURE — 90471 IMMUNIZATION ADMIN: CPT

## 2023-11-28 PROCEDURE — 80053 COMPREHEN METABOLIC PANEL: CPT

## 2023-11-28 PROCEDURE — 70450 CT HEAD/BRAIN W/O DYE: CPT | Mod: 26,MA

## 2023-11-28 PROCEDURE — 93005 ELECTROCARDIOGRAM TRACING: CPT

## 2023-11-28 PROCEDURE — 85025 COMPLETE CBC W/AUTO DIFF WBC: CPT

## 2023-11-28 PROCEDURE — 86850 RBC ANTIBODY SCREEN: CPT

## 2023-11-28 PROCEDURE — 93010 ELECTROCARDIOGRAM REPORT: CPT

## 2023-11-28 PROCEDURE — 83690 ASSAY OF LIPASE: CPT

## 2023-11-28 PROCEDURE — 36415 COLL VENOUS BLD VENIPUNCTURE: CPT

## 2023-11-28 PROCEDURE — 82550 ASSAY OF CK (CPK): CPT

## 2023-11-28 PROCEDURE — 73560 X-RAY EXAM OF KNEE 1 OR 2: CPT | Mod: 26,LT

## 2023-11-28 PROCEDURE — 73502 X-RAY EXAM HIP UNI 2-3 VIEWS: CPT | Mod: 26,LT

## 2023-11-28 PROCEDURE — 85730 THROMBOPLASTIN TIME PARTIAL: CPT

## 2023-11-28 PROCEDURE — 96361 HYDRATE IV INFUSION ADD-ON: CPT

## 2023-11-28 PROCEDURE — 72125 CT NECK SPINE W/O DYE: CPT | Mod: 26,MA

## 2023-11-28 RX ORDER — TETANUS TOXOID, REDUCED DIPHTHERIA TOXOID AND ACELLULAR PERTUSSIS VACCINE, ADSORBED 5; 2.5; 8; 8; 2.5 [IU]/.5ML; [IU]/.5ML; UG/.5ML; UG/.5ML; UG/.5ML
0.5 SUSPENSION INTRAMUSCULAR ONCE
Refills: 0 | Status: COMPLETED | OUTPATIENT
Start: 2023-11-28 | End: 2023-11-28

## 2023-11-28 RX ADMIN — TETANUS TOXOID, REDUCED DIPHTHERIA TOXOID AND ACELLULAR PERTUSSIS VACCINE, ADSORBED 0.5 MILLILITER(S): 5; 2.5; 8; 8; 2.5 SUSPENSION INTRAMUSCULAR at 04:41

## 2023-11-28 RX ADMIN — SODIUM CHLORIDE 250 MILLILITER(S): 9 INJECTION INTRAMUSCULAR; INTRAVENOUS; SUBCUTANEOUS at 01:12

## 2023-11-28 NOTE — ED PROVIDER NOTE - NSFOLLOWUPINSTRUCTIONS_ED_ALL_ED_FT
Today your evaluated for fall he had a negative CT head and cervical spine the x-rays of your pelvis chest knee and leg were negative, you received an update on your tetanus shot your troponin was negative your EKG was normal if you develop any nausea vomiting fevers  generalized weakness inability to walk, headache please return to the nearest emergency department for reevaluation            Understanding Your Risk for Falls  Each year, millions of people have serious injuries from falls. It is important to understand your risk for falling. Talk with your health care provider about your risk and what you can do to lower it. There are actions you can take at home to lower your risk and prevent falls.    If you do have a serious fall, make sure to tell your health care provider. Falling once raises your risk of falling again.    How can falls affect me?  Serious injuries from falls are common. These include:  Broken bones, such as hip fractures.  Head injuries, such as traumatic brain injuries (TBI) or concussion.  A fear of falling can cause you to avoid activities and stay at home. This can make your muscles weaker and actually raise your risk for a fall.    What can increase my risk?  There are a number of risk factors that increase your risk for falling. The more risk factors you have, the higher your risk of falling. Serious injuries from a fall happen most often to people older than age 65. Children and young adults ages 15–29 are also at higher risk.    Common risk factors include:  Weakness in the lower body.  Lack (deficiency) of vitamin D.  Being generally weak or confused due to long-term (chronic) illness.  Dizziness or balance problems.  Poor vision.  Medicines that cause dizziness or drowsiness. These can include medicines for your blood pressure, heart, anxiety, insomnia, or edema, as well as pain medicines and muscle relaxants.  Other risk factors include:  Drinking alcohol.  Having had a fall in the past.  Having depression.  Having foot pain or wearing improper footwear.  Working at a dangerous job.  Having any of the following in your home:  Tripping hazards, such as floor clutter or loose rugs.  Poor lighting.  Pets.  Having dementia or memory loss.  What actions can I take to lower my risk of falling?      Physical activity    Maintain physical fitness. Do strength and balance exercises. Consider taking a regular class to build strength and balance. Yoga and lauryn chi are good options.    Vision    Have your eyes checked every year and your vision prescription updated as needed.    Walking aids and footwear    Wear nonskid shoes. Do not wear high heels.  Do not walk around the house in socks or slippers.  Use a cane or walker as told by your health care provider.  Home safety    Attach secure railings on both sides of your stairs.  Install grab bars for your tub, shower, and toilet. Use a bath mat in your tub or shower.  Use good lighting in all rooms. Keep a flashlight near your bed.  Make sure there is a clear path from your bed to the bathroom. Use night-lights.  Do not use throw rugs. Make sure all carpeting is taped or tacked down securely.  Remove all clutter from walkways and stairways, including extension cords.  Repair uneven or broken steps.  Avoid walking on icy or slippery surfaces. Walk on the grass instead of on icy or slick sidewalks. Use ice melt to get rid of ice on walkways.  Use a cordless phone.  Questions to ask your health care provider  Can you help me check my risk for a fall?  Do any of my medicines make me more likely to fall?  Should I take a vitamin D supplement?  What exercises can I do to improve my strength and balance?  Should I make an appointment to have my vision checked?  Do I need a bone density test to check for weak bones or osteoporosis?  Would it help to use a cane or a walker?  Where to find more information  Centers for Disease Control and PreventionROJASADI: www.cdc.gov  Community-Based Fall Prevention Programs: www.cdc.gov  [employer] [place] [employer] Aging: www.eliza.nih.gov  Contact a health care provider if:  You fall at home.  You are afraid of falling at home.  You feel weak, drowsy, or dizzy.  Summary  Serious injuries from a fall happen most often to people older than age 65. Children and young adults ages 15–29 are also at higher risk.  Talk with your health care provider about your risks for falling and how to lower those risks.  Taking certain precautions at home can lower your risk for falling.  If you fall, always tell your health care provider.  This information is not intended to replace advice given to you by your health care provider. Make sure you discuss any questions you have with your health care provider.    Document Revised: 07/21/2021 Document Reviewed: 07/21/2021

## 2023-11-28 NOTE — ED PROVIDER NOTE - PATIENT PORTAL LINK FT
You can access the FollowMyHealth Patient Portal offered by [facility] by registering at the following website: [email or web address]. By joining [facility]’s FollowMyHealth portal, you will also be able to view your health information using other applications (apps) compatible with our system.

## 2023-11-28 NOTE — ED PROVIDER NOTE - OBJECTIVE STATEMENT
82 yo F w/ PMHx of HTN, recent NSTEMI s/p PCI 10/20/23 (Shockwave/DORITA x 2 pLAD), Meniere's disease, Vertigo, Anxiety d/o and h/o multiple falls, now returns to [facility] ED from [facility] facility c/o Fall out of bed onto the ground with positive head injury without LOC.  Patient states that she was reaching for something out of bed hit the right side of her head on a cabinet fell did not pass out also hit her left shin on the floor. denies associated neck pain paresthesias or shoulder pain hip pain.  States she did fall on her right side.  Unclear of downtime.  But said it was brief.  Also complaining of generalized weakness which has been ongoing since has been in the skilled nursing facility.  Denies associated chest pain shortness of breath abdominal pain vomiting or diarrhea or melena hematochezia.  Is on Plavix and aspirin

## 2023-11-28 NOTE — ED PROVIDER NOTE - PROGRESS NOTE DETAILS
Labs within normal limits troponin  downtrending and  indeterminate range, patient has no chest pain but it is significantly  decreased compared to her prior troponin, imaging is grossly negative, pending radiology read of imaging called radiology resident informed that it has been 3 hours since the study has been done study will be expedited Patient stable imaging negative Trope negative stable for DC Patient stable imaging negative Trop negative stable for DC w fu and return precautions

## 2023-11-28 NOTE — ED PROVIDER NOTE - CARE PLAN
Principal Discharge DX:	Accidental fall  Secondary Diagnosis:	Contusion of knee and lower leg, initial encounter   1

## 2023-11-28 NOTE — ED PROVIDER NOTE - CLINICAL SUMMARY MEDICAL DECISION MAKING FREE TEXT BOX
83-year-old female right leg gave out fell out of bed hit her left shin as well as knee landed on right side is on aspirin Plavix will rule out intracranial hemorrhage traumatic injury trend troponin     plan for CBC CMP UA Trope EKG CT head cervical spine x-ray right hip chest x-ray left pretibial x-ray left knee x-ray  EKG 83-year-old female right leg gave out fell out of bed hit her left shin as well as knee landed on right side is on aspirin Plavix will rule out intracranial hemorrhage traumatic injury trend troponin     plan for CBC CMP UA Trope EKG CT head cervical spine x-ray right hip chest x-ray left pretibial x-ray left knee x-ray  EKGNormal sinus rhythm 82 first-degree AV block no acute ischemia no STEMI

## 2023-11-28 NOTE — ED PROVIDER NOTE - PHYSICAL EXAMINATION
VITAL SIGNS: I have reviewed nursing notes and confirm.  CONSTITUTIONAL: Well appearing, in no acute distress. GCS 15 small area of ecchymoses to right frontal  scalp without laceration or hematoma  SKIN:  warm and dry, no acute rash.   HEAD:  normocephalic, atraumatic.   EYES: EOM intact; conjunctiva and sclera clear. PERRLA EOMI  ENT: No nasal discharge; airway clear.  no nasal septal hematomas maxillofacial tenderness zygoma tenderness gross deformities no hemotympanum negative Baez sign  NECK: Supple. no cervical thoracic or lumbar tenderness or step-offs midline no ecchymoses no crepitus  CARD: S1, S2, Regular rate and rhythm.   RESP:  Clear to auscultation b/l, no wheezes, rales or rhonchi.  ABD: Normal bowel sounds; soft; non-distended; non-tender; no guarding/ rebound.  EXT: Normal ROM. No peripheral edema. Pulses intact and equal b/l. no foot drop bilaterally pulses intact left mid tibia ecchymosis with small  puncture/avulsion laceration not actively bleedingfull range of motion's at hip and knees bilaterally  NEURO: Alert, oriented, grossly unremarkable  PSYCH: Cooperative, mood and affect appropriate.

## 2023-11-30 DIAGNOSIS — Z79.82 LONG TERM (CURRENT) USE OF ASPIRIN: ICD-10-CM

## 2023-11-30 DIAGNOSIS — Z88.0 ALLERGY STATUS TO PENICILLIN: ICD-10-CM

## 2023-11-30 DIAGNOSIS — F41.9 ANXIETY DISORDER, UNSPECIFIED: ICD-10-CM

## 2023-11-30 DIAGNOSIS — Z88.5 ALLERGY STATUS TO NARCOTIC AGENT: ICD-10-CM

## 2023-11-30 DIAGNOSIS — S80.02XA CONTUSION OF LEFT KNEE, INITIAL ENCOUNTER: ICD-10-CM

## 2023-11-30 DIAGNOSIS — Z79.02 LONG TERM (CURRENT) USE OF ANTITHROMBOTICS/ANTIPLATELETS: ICD-10-CM

## 2023-11-30 DIAGNOSIS — Z23 ENCOUNTER FOR IMMUNIZATION: ICD-10-CM

## 2023-11-30 DIAGNOSIS — R53.1 WEAKNESS: ICD-10-CM

## 2023-11-30 DIAGNOSIS — I25.2 OLD MYOCARDIAL INFARCTION: ICD-10-CM

## 2023-11-30 DIAGNOSIS — Z86.69 PERSONAL HISTORY OF OTHER DISEASES OF THE NERVOUS SYSTEM AND SENSE ORGANS: ICD-10-CM
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
## 2023-11-30 DIAGNOSIS — S00.03XA CONTUSION OF SCALP, INITIAL ENCOUNTER: ICD-10-CM

## 2023-11-30 DIAGNOSIS — I10 ESSENTIAL (PRIMARY) HYPERTENSION: ICD-10-CM

## 2023-11-30 DIAGNOSIS — I44.0 ATRIOVENTRICULAR BLOCK, FIRST DEGREE: ICD-10-CM

## 2023-11-30 DIAGNOSIS — S80.12XA CONTUSION OF LEFT LOWER LEG, INITIAL ENCOUNTER: ICD-10-CM
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
## 2024-02-22 RX ORDER — METOPROLOL SUCCINATE 25 MG/1
25 TABLET, EXTENDED RELEASE ORAL DAILY
Qty: 90 | Refills: 3 | Status: ACTIVE | COMMUNITY
Start: 1900-01-01 | End: 1900-01-01

## 2024-02-22 RX ORDER — CLOPIDOGREL BISULFATE 75 MG/1
75 TABLET, FILM COATED ORAL DAILY
Qty: 90 | Refills: 3 | Status: ACTIVE | COMMUNITY
Start: 1900-01-01 | End: 1900-01-01

## 2024-02-22 RX ORDER — ATORVASTATIN CALCIUM 40 MG/1
40 TABLET, FILM COATED ORAL DAILY
Qty: 90 | Refills: 3 | Status: ACTIVE | COMMUNITY
Start: 1900-01-01 | End: 1900-01-01

## 2024-03-11 ENCOUNTER — APPOINTMENT (OUTPATIENT)
Dept: HEART AND VASCULAR | Facility: CLINIC | Age: 84
End: 2024-03-11
Payer: MEDICARE

## 2024-03-11 ENCOUNTER — NON-APPOINTMENT (OUTPATIENT)
Age: 84
End: 2024-03-11

## 2024-03-11 VITALS
BODY MASS INDEX: 17.36 KG/M2 | SYSTOLIC BLOOD PRESSURE: 115 MMHG | WEIGHT: 98 LBS | HEIGHT: 63 IN | HEART RATE: 69 BPM | DIASTOLIC BLOOD PRESSURE: 60 MMHG | TEMPERATURE: 98.2 F | OXYGEN SATURATION: 99 %

## 2024-03-11 DIAGNOSIS — I10 ESSENTIAL (PRIMARY) HYPERTENSION: ICD-10-CM

## 2024-03-11 LAB
ALBUMIN SERPL ELPH-MCNC: 4.2 G/DL
ALP BLD-CCNC: 110 U/L
ALT SERPL-CCNC: 18 U/L
ANION GAP SERPL CALC-SCNC: 8 MMOL/L
AST SERPL-CCNC: 20 U/L
BILIRUB SERPL-MCNC: 0.2 MG/DL
BUN SERPL-MCNC: 27 MG/DL
CALCIUM SERPL-MCNC: 9.3 MG/DL
CHLORIDE SERPL-SCNC: 102 MMOL/L
CHOLEST SERPL-MCNC: 158 MG/DL
CO2 SERPL-SCNC: 28 MMOL/L
CREAT SERPL-MCNC: 0.7 MG/DL
EGFR: 85 ML/MIN/1.73M2
GLUCOSE SERPL-MCNC: 61 MG/DL
HDLC SERPL-MCNC: 59 MG/DL
LDLC SERPL CALC-MCNC: 76 MG/DL
NONHDLC SERPL-MCNC: 99 MG/DL
POTASSIUM SERPL-SCNC: 4.2 MMOL/L
PROT SERPL-MCNC: 6.2 G/DL
SODIUM SERPL-SCNC: 138 MMOL/L
TRIGL SERPL-MCNC: 131 MG/DL
TSH SERPL-ACNC: 2.98 UIU/ML

## 2024-03-11 PROCEDURE — 93000 ELECTROCARDIOGRAM COMPLETE: CPT

## 2024-03-11 PROCEDURE — G2211 COMPLEX E/M VISIT ADD ON: CPT

## 2024-03-11 PROCEDURE — 99214 OFFICE O/P EST MOD 30 MIN: CPT

## 2024-03-11 RX ORDER — TOPIRAMATE 25 MG/1
25 TABLET, FILM COATED ORAL
Qty: 30 | Refills: 5 | Status: ACTIVE | COMMUNITY
Start: 1900-01-01 | End: 1900-01-01

## 2024-03-11 RX ORDER — OXYCODONE 5 MG/1
5 TABLET ORAL
Refills: 0 | Status: ACTIVE | COMMUNITY

## 2024-03-11 RX ORDER — POTASSIUM CHLORIDE 1500 MG/1
20 TABLET, FILM COATED, EXTENDED RELEASE ORAL
Qty: 30 | Refills: 5 | Status: ACTIVE | COMMUNITY
Start: 1900-01-01 | End: 1900-01-01

## 2024-03-11 RX ORDER — HYDROCHLOROTHIAZIDE 12.5 MG/1
12.5 CAPSULE ORAL DAILY
Qty: 30 | Refills: 5 | Status: ACTIVE | COMMUNITY
Start: 2024-03-11 | End: 1900-01-01

## 2024-03-11 RX ORDER — BUPROPION HYDROCHLORIDE 300 MG/1
300 TABLET, EXTENDED RELEASE ORAL DAILY
Qty: 30 | Refills: 5 | Status: ACTIVE | COMMUNITY
Start: 1900-01-01 | End: 1900-01-01

## 2024-03-11 RX ORDER — ONDANSETRON HYDROCHLORIDE 24 MG/1
24 TABLET, FILM COATED ORAL
Refills: 0 | Status: ACTIVE | COMMUNITY

## 2024-03-11 RX ORDER — B-COMPLEX WITH VITAMIN C
TABLET ORAL
Refills: 0 | Status: ACTIVE | COMMUNITY

## 2024-03-11 RX ORDER — MULTIVIT-MIN/IRON/FOLIC ACID/K 18-600-40
50 MCG CAPSULE ORAL
Refills: 0 | Status: ACTIVE | COMMUNITY

## 2024-03-11 RX ORDER — SENNOSIDES 8.6 MG
TABLET ORAL
Refills: 0 | Status: ACTIVE | COMMUNITY

## 2024-03-11 RX ORDER — ANTACID TABLETS 500 MG/1
500 TABLET, CHEWABLE ORAL
Refills: 0 | Status: ACTIVE | COMMUNITY

## 2024-03-11 RX ORDER — TRIAMTERENE AND HYDROCHLOROTHIAZIDE 25; 37.5 MG/1; MG/1
37.5-25 TABLET ORAL DAILY
Refills: 0 | Status: DISCONTINUED | COMMUNITY

## 2024-03-11 NOTE — PHYSICAL EXAM
[Well Developed] : well developed [Well Nourished] : well nourished [No Acute Distress] : no acute distress [Normal Conjunctiva] : normal conjunctiva [No Carotid Bruit] : no carotid bruit [Normal Venous Pressure] : normal venous pressure [Normal S1, S2] : normal S1, S2 [No Murmur] : no murmur [No Rub] : no rub [No Gallop] : no gallop [Clear Lung Fields] : clear lung fields [Good Air Entry] : good air entry [No Respiratory Distress] : no respiratory distress  [Soft] : abdomen soft [Non Tender] : non-tender [No Masses/organomegaly] : no masses/organomegaly [Normal Bowel Sounds] : normal bowel sounds [Normal Gait] : normal gait [No Edema] : no edema [No Cyanosis] : no cyanosis [No Varicosities] : no varicosities [No Clubbing] : no clubbing [No Rash] : no rash [No Skin Lesions] : no skin lesions [Moves all extremities] : moves all extremities [Normal Speech] : normal speech [No Focal Deficits] : no focal deficits [Alert and Oriented] : alert and oriented [Normal memory] : normal memory

## 2024-03-11 NOTE — HISTORY OF PRESENT ILLNESS
[FreeTextEntry1] : 83 M CAD  - HTN, - DM, + lipids, remote tobacco   [facility] ED 10/16/23 with new onset of typical chest pain, found to have NSTEMI. cath 95% p LAD, s/p DORITA PCWP 8  SInce d/c main sx has been fatigue and weakness No CP or SOB  Subsequent admit for constipation. Now home  Last visit notable for low BP Advised  stop triamterene HCTZ, substitute HCTZ 12.5 mg once daily, but apparently still taking original meds BP ok at PMD 2 weeks ago by pt report.

## 2024-03-11 NOTE — ASSESSMENT
[FreeTextEntry1] : EKG NSR normal  A/P  1. CAD, native heart, native vessel, without angina 2. DORITA LAD Remains angina free Continue risk modification Continue DAPT In terms of pt symptoms, as no other residual CAD, LVEF normal, doubt her current subjective symptoms are due to cardiac etiology. As BP today is low, more likely over medication.  Still unsteady Again Would stop triamterene HCTZ, substitute HCTZ 12.5 mg once daily, can increase to 25 mg daily if BP elevated. check K 1 mo  3. Hyperlipidemia, target LDL <70 started atorva 40 in hosp  recheck labs    NB pt taking bupopriom and topimarate main sx is fatigeu will renew today but tp will discuss w PMD Dr German cedeno

## 2024-03-12 LAB
HCT VFR BLD CALC: 37.5 %
HGB BLD-MCNC: 11 G/DL
MCHC RBC-ENTMCNC: 27.8 PG
MCHC RBC-ENTMCNC: 29.3 GM/DL
MCV RBC AUTO: 94.7 FL
PLATELET # BLD AUTO: 268 K/UL
RBC # BLD: 3.96 M/UL
RBC # FLD: 14.6 %
WBC # FLD AUTO: 5.54 K/UL

## 2024-05-22 NOTE — OCCUPATIONAL THERAPY INITIAL EVALUATION ADULT - LOWER BODY DRESSING, PREVIOUS LEVEL OF FUNCTION, OT EVAL
Reports itchy bumps all over her body; she scratches and they scab up  Would like to see derm   independent

## 2024-05-29 ENCOUNTER — APPOINTMENT (OUTPATIENT)
Dept: HEART AND VASCULAR | Facility: CLINIC | Age: 84
End: 2024-05-29

## 2024-08-15 ENCOUNTER — INPATIENT (INPATIENT)
Facility: HOSPITAL | Age: 84
LOS: 2 days | Discharge: NURSING, REHAB OR LONG-TERM CARE | DRG: 551 | End: 2024-08-18
Attending: STUDENT IN AN ORGANIZED HEALTH CARE EDUCATION/TRAINING PROGRAM | Admitting: INTERNAL MEDICINE
Payer: MEDICARE

## 2024-08-15 VITALS
OXYGEN SATURATION: 99 % | DIASTOLIC BLOOD PRESSURE: 69 MMHG | HEART RATE: 85 BPM | RESPIRATION RATE: 18 BRPM | WEIGHT: 89.07 LBS | SYSTOLIC BLOOD PRESSURE: 130 MMHG | TEMPERATURE: 98 F | HEIGHT: 62 IN

## 2024-08-15 DIAGNOSIS — F32.9 MAJOR DEPRESSIVE DISORDER, SINGLE EPISODE, UNSPECIFIED: ICD-10-CM

## 2024-08-15 DIAGNOSIS — Z86.69 PERSONAL HISTORY OF OTHER DISEASES OF THE NERVOUS SYSTEM AND SENSE ORGANS: ICD-10-CM

## 2024-08-15 DIAGNOSIS — I10 ESSENTIAL (PRIMARY) HYPERTENSION: ICD-10-CM

## 2024-08-15 DIAGNOSIS — Z87.81 PERSONAL HISTORY OF (HEALED) TRAUMATIC FRACTURE: Chronic | ICD-10-CM

## 2024-08-15 DIAGNOSIS — I25.10 ATHEROSCLEROTIC HEART DISEASE OF NATIVE CORONARY ARTERY WITHOUT ANGINA PECTORIS: ICD-10-CM

## 2024-08-15 DIAGNOSIS — G43.909 MIGRAINE, UNSPECIFIED, NOT INTRACTABLE, WITHOUT STATUS MIGRAINOSUS: ICD-10-CM

## 2024-08-15 DIAGNOSIS — R29.6 REPEATED FALLS: ICD-10-CM

## 2024-08-15 DIAGNOSIS — M54.9 DORSALGIA, UNSPECIFIED: ICD-10-CM

## 2024-08-15 DIAGNOSIS — E43 UNSPECIFIED SEVERE PROTEIN-CALORIE MALNUTRITION: ICD-10-CM

## 2024-08-15 DIAGNOSIS — I25.2 OLD MYOCARDIAL INFARCTION: ICD-10-CM

## 2024-08-15 DIAGNOSIS — F41.9 ANXIETY DISORDER, UNSPECIFIED: ICD-10-CM

## 2024-08-15 DIAGNOSIS — I44.0 ATRIOVENTRICULAR BLOCK, FIRST DEGREE: ICD-10-CM

## 2024-08-15 DIAGNOSIS — G89.29 OTHER CHRONIC PAIN: ICD-10-CM

## 2024-08-15 DIAGNOSIS — Z29.9 ENCOUNTER FOR PROPHYLACTIC MEASURES, UNSPECIFIED: ICD-10-CM

## 2024-08-15 DIAGNOSIS — Z79.899 OTHER LONG TERM (CURRENT) DRUG THERAPY: ICD-10-CM

## 2024-08-15 DIAGNOSIS — Z95.5 PRESENCE OF CORONARY ANGIOPLASTY IMPLANT AND GRAFT: ICD-10-CM

## 2024-08-15 DIAGNOSIS — Z98.890 OTHER SPECIFIED POSTPROCEDURAL STATES: Chronic | ICD-10-CM

## 2024-08-15 DIAGNOSIS — Z79.82 LONG TERM (CURRENT) USE OF ASPIRIN: ICD-10-CM

## 2024-08-15 DIAGNOSIS — M25.559 PAIN IN UNSPECIFIED HIP: ICD-10-CM

## 2024-08-15 LAB
ANION GAP SERPL CALC-SCNC: 11 MMOL/L — SIGNIFICANT CHANGE UP (ref 5–17)
BASOPHILS # BLD AUTO: 0.02 K/UL — SIGNIFICANT CHANGE UP (ref 0–0.2)
BASOPHILS NFR BLD AUTO: 0.2 % — SIGNIFICANT CHANGE UP (ref 0–2)
BUN SERPL-MCNC: 30 MG/DL — HIGH (ref 7–23)
CALCIUM SERPL-MCNC: 9.6 MG/DL — SIGNIFICANT CHANGE UP (ref 8.4–10.5)
CHLORIDE SERPL-SCNC: 106 MMOL/L — SIGNIFICANT CHANGE UP (ref 96–108)
CO2 SERPL-SCNC: 26 MMOL/L — SIGNIFICANT CHANGE UP (ref 22–31)
CREAT SERPL-MCNC: 0.75 MG/DL — SIGNIFICANT CHANGE UP (ref 0.5–1.3)
EGFR: 78 ML/MIN/1.73M2 — SIGNIFICANT CHANGE UP
EOSINOPHIL # BLD AUTO: 0.03 K/UL — SIGNIFICANT CHANGE UP (ref 0–0.5)
EOSINOPHIL NFR BLD AUTO: 0.3 % — SIGNIFICANT CHANGE UP (ref 0–6)
GLUCOSE SERPL-MCNC: 98 MG/DL — SIGNIFICANT CHANGE UP (ref 70–99)
HCT VFR BLD CALC: 36 % — SIGNIFICANT CHANGE UP (ref 34.5–45)
HGB BLD-MCNC: 11.2 G/DL — LOW (ref 11.5–15.5)
IMM GRANULOCYTES NFR BLD AUTO: 0.4 % — SIGNIFICANT CHANGE UP (ref 0–0.9)
LYMPHOCYTES # BLD AUTO: 1.49 K/UL — SIGNIFICANT CHANGE UP (ref 1–3.3)
LYMPHOCYTES # BLD AUTO: 16.6 % — SIGNIFICANT CHANGE UP (ref 13–44)
MCHC RBC-ENTMCNC: 27.8 PG — SIGNIFICANT CHANGE UP (ref 27–34)
MCHC RBC-ENTMCNC: 31.1 GM/DL — LOW (ref 32–36)
MCV RBC AUTO: 89.3 FL — SIGNIFICANT CHANGE UP (ref 80–100)
MONOCYTES # BLD AUTO: 0.68 K/UL — SIGNIFICANT CHANGE UP (ref 0–0.9)
MONOCYTES NFR BLD AUTO: 7.6 % — SIGNIFICANT CHANGE UP (ref 2–14)
NEUTROPHILS # BLD AUTO: 6.69 K/UL — SIGNIFICANT CHANGE UP (ref 1.8–7.4)
NEUTROPHILS NFR BLD AUTO: 74.9 % — SIGNIFICANT CHANGE UP (ref 43–77)
NRBC # BLD: 0 /100 WBCS — SIGNIFICANT CHANGE UP (ref 0–0)
PLATELET # BLD AUTO: 327 K/UL — SIGNIFICANT CHANGE UP (ref 150–400)
POTASSIUM SERPL-MCNC: 4.2 MMOL/L — SIGNIFICANT CHANGE UP (ref 3.5–5.3)
POTASSIUM SERPL-SCNC: 4.2 MMOL/L — SIGNIFICANT CHANGE UP (ref 3.5–5.3)
RBC # BLD: 4.03 M/UL — SIGNIFICANT CHANGE UP (ref 3.8–5.2)
RBC # FLD: 15.2 % — HIGH (ref 10.3–14.5)
SODIUM SERPL-SCNC: 143 MMOL/L — SIGNIFICANT CHANGE UP (ref 135–145)
WBC # BLD: 8.95 K/UL — SIGNIFICANT CHANGE UP (ref 3.8–10.5)
WBC # FLD AUTO: 8.95 K/UL — SIGNIFICANT CHANGE UP (ref 3.8–10.5)

## 2024-08-15 PROCEDURE — 99223 1ST HOSP IP/OBS HIGH 75: CPT | Mod: GC

## 2024-08-15 PROCEDURE — 72128 CT CHEST SPINE W/O DYE: CPT | Mod: 26,MC

## 2024-08-15 PROCEDURE — 93010 ELECTROCARDIOGRAM REPORT: CPT

## 2024-08-15 PROCEDURE — 72125 CT NECK SPINE W/O DYE: CPT | Mod: 26,MC

## 2024-08-15 PROCEDURE — 70450 CT HEAD/BRAIN W/O DYE: CPT | Mod: 26,MC

## 2024-08-15 PROCEDURE — 99285 EMERGENCY DEPT VISIT HI MDM: CPT | Mod: FS

## 2024-08-15 PROCEDURE — 71250 CT THORAX DX C-: CPT | Mod: 26,MC

## 2024-08-15 RX ORDER — OXYCODONE HYDROCHLORIDE 5 MG/1
5 TABLET ORAL EVERY 6 HOURS
Refills: 0 | Status: DISCONTINUED | OUTPATIENT
Start: 2024-08-15 | End: 2024-08-18

## 2024-08-15 RX ORDER — TRIAMTERENE/HYDROCHLOROTHIAZID 75 MG-50MG
1 TABLET ORAL DAILY
Refills: 0 | Status: DISCONTINUED | OUTPATIENT
Start: 2024-08-15 | End: 2024-08-18

## 2024-08-15 RX ORDER — SERTRALINE HYDROCHLORIDE 50 MG/1
150 TABLET, FILM COATED ORAL DAILY
Refills: 0 | Status: DISCONTINUED | OUTPATIENT
Start: 2024-08-15 | End: 2024-08-18

## 2024-08-15 RX ORDER — METOPROLOL TARTRATE 100 MG/1
25 TABLET ORAL DAILY
Refills: 0 | Status: DISCONTINUED | OUTPATIENT
Start: 2024-08-15 | End: 2024-08-18

## 2024-08-15 RX ORDER — METOPROLOL TARTRATE 100 MG/1
25 TABLET ORAL DAILY
Refills: 0 | Status: DISCONTINUED | OUTPATIENT
Start: 2024-08-15 | End: 2024-08-15

## 2024-08-15 RX ORDER — BACLOFEN 0.5 MG/ML
0 INJECTION INTRATHECAL
Qty: 0 | Refills: 0 | DISCHARGE

## 2024-08-15 RX ORDER — TRIAMTERENE/HYDROCHLOROTHIAZID 75 MG-50MG
1 TABLET ORAL DAILY
Refills: 0 | Status: DISCONTINUED | OUTPATIENT
Start: 2024-08-15 | End: 2024-08-15

## 2024-08-15 RX ORDER — LACTULOSE 10 G
0 PACKET (EA) ORAL
Qty: 0 | Refills: 0 | DISCHARGE

## 2024-08-15 RX ORDER — DICLOFENAC SODIUM 20 MG/G
0 SOLUTION TOPICAL
Qty: 0 | Refills: 0 | DISCHARGE

## 2024-08-15 RX ORDER — OXYCODONE HYDROCHLORIDE 5 MG/1
2.5 TABLET ORAL EVERY 6 HOURS
Refills: 0 | Status: DISCONTINUED | OUTPATIENT
Start: 2024-08-15 | End: 2024-08-15

## 2024-08-15 RX ORDER — POTASSIUM CHLORIDE 10 MEQ
0 TABLET, EXT RELEASE, PARTICLES/CRYSTALS ORAL
Qty: 0 | Refills: 0 | DISCHARGE

## 2024-08-15 RX ORDER — ENOXAPARIN SODIUM 100 MG/ML
30 INJECTION SUBCUTANEOUS EVERY 24 HOURS
Refills: 0 | Status: DISCONTINUED | OUTPATIENT
Start: 2024-08-15 | End: 2024-08-18

## 2024-08-15 RX ORDER — POLYETHYLENE GLYCOL 3350 17 G/17G
17 POWDER, FOR SOLUTION ORAL EVERY 12 HOURS
Refills: 0 | Status: DISCONTINUED | OUTPATIENT
Start: 2024-08-15 | End: 2024-08-18

## 2024-08-15 RX ORDER — SENNA 187 MG
2 TABLET ORAL AT BEDTIME
Refills: 0 | Status: DISCONTINUED | OUTPATIENT
Start: 2024-08-15 | End: 2024-08-18

## 2024-08-15 RX ORDER — NITROFURANTOIN MONOHYD/M-CRYST 100 MG
0 CAPSULE ORAL
Qty: 0 | Refills: 0 | DISCHARGE

## 2024-08-15 RX ORDER — OXYCODONE HYDROCHLORIDE 5 MG/1
10 TABLET ORAL EVERY 8 HOURS
Refills: 0 | Status: DISCONTINUED | OUTPATIENT
Start: 2024-08-15 | End: 2024-08-18

## 2024-08-15 RX ORDER — ACETAMINOPHEN 325 MG/1
650 TABLET ORAL EVERY 6 HOURS
Refills: 0 | Status: DISCONTINUED | OUTPATIENT
Start: 2024-08-15 | End: 2024-08-15

## 2024-08-15 RX ORDER — BUPROPION HYDROCHLORIDE 150 MG/1
300 TABLET ORAL DAILY
Refills: 0 | Status: DISCONTINUED | OUTPATIENT
Start: 2024-08-15 | End: 2024-08-18

## 2024-08-15 RX ORDER — ASPIRIN 81 MG
81 TABLET, DELAYED RELEASE (ENTERIC COATED) ORAL DAILY
Refills: 0 | Status: DISCONTINUED | OUTPATIENT
Start: 2024-08-15 | End: 2024-08-18

## 2024-08-15 RX ORDER — NALOXONE HCL 1 MG/ML
0 VIAL (ML) INJECTION
Qty: 0 | Refills: 0 | DISCHARGE

## 2024-08-15 RX ORDER — LIDOCAINE/BENZALKONIUM/ALCOHOL
1 SOLUTION, NON-ORAL TOPICAL DAILY
Refills: 0 | Status: DISCONTINUED | OUTPATIENT
Start: 2024-08-15 | End: 2024-08-18

## 2024-08-15 RX ORDER — ACETAMINOPHEN 325 MG/1
650 TABLET ORAL EVERY 6 HOURS
Refills: 0 | Status: DISCONTINUED | OUTPATIENT
Start: 2024-08-15 | End: 2024-08-18

## 2024-08-15 RX ADMIN — METOPROLOL TARTRATE 25 MILLIGRAM(S): 100 TABLET ORAL at 22:43

## 2024-08-15 RX ADMIN — Medication 1 PATCH: at 22:43

## 2024-08-15 RX ADMIN — OXYCODONE HYDROCHLORIDE 10 MILLIGRAM(S): 5 TABLET ORAL at 23:43

## 2024-08-15 RX ADMIN — ENOXAPARIN SODIUM 30 MILLIGRAM(S): 100 INJECTION SUBCUTANEOUS at 22:42

## 2024-08-15 RX ADMIN — Medication 40 MILLIGRAM(S): at 22:43

## 2024-08-15 RX ADMIN — Medication 75 MILLIGRAM(S): at 22:43

## 2024-08-15 RX ADMIN — BUPROPION HYDROCHLORIDE 300 MILLIGRAM(S): 150 TABLET ORAL at 22:43

## 2024-08-15 RX ADMIN — Medication 81 MILLIGRAM(S): at 22:43

## 2024-08-15 RX ADMIN — POLYETHYLENE GLYCOL 3350 17 GRAM(S): 17 POWDER, FOR SOLUTION ORAL at 22:43

## 2024-08-15 RX ADMIN — Medication 2 TABLET(S): at 22:43

## 2024-08-15 RX ADMIN — SERTRALINE HYDROCHLORIDE 150 MILLIGRAM(S): 50 TABLET, FILM COATED ORAL at 22:43
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
## 2024-08-15 NOTE — H&P ADULT - NSHPLABSRESULTS_GEN_ALL_CORE
LABS:                        11.2   8.95  )-----------( 327      ( 15 Aug 2024 16:32 )             36.0     08-15    143  |  106  |  30<H>  ----------------------------<  98  4.2   |  26  |  0.75    Ca    9.6      15 Aug 2024 16:32        Urinalysis Basic - ( 15 Aug 2024 16:32 )    Color: x / Appearance: x / SG: x / pH: x  Gluc: 98 mg/dL / Ketone: x  / Bili: x / Urobili: x   Blood: x / Protein: x / Nitrite: x   Leuk Esterase: x / RBC: x / WBC x   Sq Epi: x / Non Sq Epi: x / Bacteria: x          RADIOLOGY & ADDITIONAL TESTS:    MEDICATIONS  (STANDING):    MEDICATIONS  (PRN):

## 2024-08-15 NOTE — ED PROVIDER NOTE - ATTENDING APP SHARED VISIT CONTRIBUTION OF CARE
Vitals wnl, exam as above.   Imaging w/ no significant findings.   Given percocet.   Pt still in pain (mostly L posterior/inferior ribs).   Pt lives alone - states there is no way she will be able to manage at home on her own with this pain. Will check basic labs, likely medicine admission for pain control/possible rehab.  ddX: Mechanical fall, likely just benign MSK pain.

## 2024-08-15 NOTE — H&P ADULT - PROBLEM SELECTOR PLAN 5
denies dizziness, hearing loss, well controlled denies dizziness, hearing loss, well controlled    - CTM - on home sertraline and bupropion  -Qtc 417    - Continue sertraline 150mg  - Continue bupropion

## 2024-08-15 NOTE — PATIENT PROFILE ADULT - FUNCTIONAL ASSESSMENT - BASIC MOBILITY 6.
2-calculated by average/Not able to assess (calculate score using [facility] averaging method)

## 2024-08-15 NOTE — ED PROVIDER NOTE - OBJECTIVE STATEMENT
84 y/o f hx HTN, CAD w/ stents, Meniere's disease, vertigo, anxiety, pelvic fracture 5/2024 presents c/o mechanical fall this morning.  Pt stating she was attempting to get onto her bed which is raised, was backing up to it and fell back, hitting her back against the frame and then to the ground.  Pt stating she doesn't think she hit her head, wasn't able to get up on her own, called EMS who brougtht pt to hospital.  Pt reports pain to left mid back, no other injuries.  Denies LOC, dizziness, CP, SOB, numbness/tingling to ext, all other ROS negative.

## 2024-08-15 NOTE — H&P ADULT - VTE RISK ASSESSMENT
Please be informed that if you contact our office outside of normal business hours the physician on call cannot help with any scheduling or rescheduling issues, procedure instruction questions or any type of medication issue. We advise you for any urgent/emergency that you go to the nearest emergency room! PLEASE CALL OUR OFFICE DURING NORMAL BUSINESS HOURS    Monday - Friday   8 am to 5 pm    [place]: [address]: [phone]    [place]:  [phone]  **It is YOUR responsibilty to bring medication bottles and/or updated medication list to [facility]. This will allow us to better serve you and all your healthcare needs**  Thank you for allowing us to care for you today! We want to ensure we can follow your treatment plan and we strive to give you the best outcomes and experience possible. If you ever have a life threatening emergency and call 911 - for an ambulance (EMS)   Our providers can only care for you at:   [facility] or [facility]. Even if you have someone take you or you drive yourself we can only care for you in a [facility] facility. Our providers are not setup at the other healthcare locations! <<--- Click to launch

## 2024-08-15 NOTE — H&P ADULT - PROBLEM SELECTOR PLAN 2
- on home metoprolol and triamterene/HCTZ    - Continue home meds acute L side back pain s/p mechanical fall  Imaging negative for rib fractures  - pain management

## 2024-08-15 NOTE — ED PROVIDER NOTE - ATTESTATION, MLM
----- Message from Miguel Angel Padron sent at 3/22/2024  9:06 AM CDT -----  Pt Requesting Call Back    Who called: pt  Who called for pt:  Best call back #: [phone]  Add notes: pt said she needs to speak to Dr. Mabry or the nurse regarding her not being able to get two of her medications filled (pt declined for rx request message to be sent)       I have reviewed and confirmed nurses' notes for patient's medications, allergies, medical history, and surgical history.

## 2024-08-15 NOTE — H&P ADULT - PROBLEM SELECTOR PLAN 3
s/p PCI 10/20/23 (Shockwave/DORITA x 2 pLAD)  - on home Plavix, aspirin, statin    - Continue home meds s/p PCI 10/20/23 (Shockwave/DORITA x 2 pLAD)  - on home Plavix, aspirin, statin, metoprolol    - Continue home meds - on home metoprolol and triamterene/HCTZ    - Continue home meds

## 2024-08-15 NOTE — PATIENT PROFILE ADULT - PUBLIC BENEFITS
Regarding: [place]-Fever   ----- Message from Anny Sibley sent at 3/4/2020  5:27 AM CST -----  Patient Name: Oliver Hong  Specialist or PCP Full Name:Whit Masterson   Pregnant (If Yes, how long?):N/A    Symptoms: Pt was in yesterday with high fever and cough, given tylenol and ibuprofen and temp going up 103.9     If fever, cough, and/or shortness of breath,      Have you traveled outside of the country in the last 14 days?: No     If yes, is it [place], [place], [place], [place], or [place]?: No      Have you had close contact with someone who has tested positive for the Coronavirus?: No     Call Back #:[phone]  Which state are you currently located in? [Enter State abbreviation in Summary field along with chief complaint]: [place]  Call Center Account #:[id number]       no

## 2024-08-15 NOTE — H&P ADULT - NSHPSOCIALHISTORY_GEN_ALL_CORE
Lives alone at home; has HHA 12hours daily  Denies smoking, alcohol, and drug use  Son lives in [place], no other family members

## 2024-08-15 NOTE — PATIENT PROFILE ADULT - FUNCTIONAL ASSESSMENT - DAILY ACTIVITY 5.
Telephone Encounter by Rachel Martin NCMA at 04/23/18 10:49 AM     Author:  Rachel Martin NCMA Service:  (none) Author Type:  Medical Assistant     Filed:  04/23/18 10:50 AM Encounter Date:  4/22/2018 Status:  Signed     :  Rachel Martin NCMA (Medical Assistant)            Last refill[LH1.1M]     Disp Refills Start End CONOR        glipiZIDE (GLIPIZIDE XL) 10 MG 24 hr tablet 90 Each 1 3/12/2018  No       Sig: Take 1/2 tablet by mouth daily            Class: Historical Med            Notes to Pharmacy: Please place refills on file, do not fill if not due at this time. Thank you            Order: [id number][LH1.1C]             Last office  03/26/2018  Medication pending until approved by provider[LH1.1M]  Electronically Signed by:    MANDA oT , 4/23/2018[LH1.2T]        Revision History        User Key Date/Time User Provider Type Action    > LH1.2 04/23/18 10:50 AM Rachel Martin NCMA Medical Assistant Sign     LH1.1 04/23/18 10:49 AM Rachel Martin NCMA Medical Assistant     C - Copied, M - Manual, T - Template             3 = A little assistance

## 2024-08-15 NOTE — ED ADULT NURSE NOTE - ISAR SCREEN YN
Left voicemail message for patient - PAT appointment is on 1/4 @ 230 pm and arrival time for surgery on 1/10 is 11 am in the main OR    Patient can refer to her surgery packet for additional information   Yes

## 2024-08-15 NOTE — ED ADULT NURSE NOTE - OBJECTIVE STATEMENT
83y F presents to ED c/o mechanical fall. Pt reports "trying to get into bed and slipping off. I banged my back on the floor." REports landing on L side, attempting to get up but unable to on her own, slid towards wall to call EMS. Endorses L back pain, abrasion. + AC use. Denies head strike, LOC, numbness/tingling, CP/SOB, dizziness/vision changes. Pt states "I think I broke a rib. I broke my sacrum 2 months ago too." Pt with multiple falls at home recently, uses walker for ambulation. Pt oriented to person/place on arrival, speaking in full and clear sentences, NAD at this time.

## 2024-08-15 NOTE — H&P ADULT - PROBLEM SELECTOR PLAN 4
- on home sertraline and bupropion  - held bupropion pending EKG to monitor QT     - Continue sertraline 150mg - on home sertraline and bupropion  -Qtc 417    - Continue sertraline 150mg  - Continue bupropion hx NSTEMI s/p PCI 10/20/23 (Shockwave/DORITA x 2 pLAD)  - on home Plavix, aspirin, statin, metoprolol    - Continue home meds

## 2024-08-15 NOTE — H&P ADULT - HISTORY OF PRESENT ILLNESS
Ms. Lezama is a 84 yo F w/ PMHx of pelvic fracture 5/2024, HTN, NSTEMI s/p PCI 10/20/23 (Shockwave/DORITA x 2 pLAD), Meniere's disease, Vertigo, Depression, presents to  [facility] ED c/o mechanical fall that occurred this morning. As she was trying to get into her bed, she was backing into but fell into the bed frame and hit her L back against the frame. Denies LOC or hitting her head. She waited to get back up but was unable to get up after a few minutes and called ambulance. Since then, she has pain in her L flank  which is exacerbated with any movement or deep inspiration. She has had multiple ED visits over the past year for mechanical falls. As per patient, she had a L hip fracture in 01/2024 and had a R hip fracture in 04/2024. She was recently in [facility]'s ED 3 days ago for a mechanical fall and was discharged straight from the ED. In the past, she was sent to [facility] after her admissions for her falls. She uses her walker at baseline and has a HHA 12hours daily. She also mentions losing 10 pounds in the past year, especially after her NSTEMI. She has been eating and hydrating well, she avoids gluten.  Ms. Lezama is a 84 yo F w/ PMHx of pelvic fracture 5/2024, HTN, NSTEMI s/p PCI 10/20/23 (Shockwave/DORITA x 2 pLAD), Meniere's disease, Vertigo, Depression, presents to  [facility] ED c/o mechanical fall that occurred this morning. As she was trying to get into her bed, she was backing into but fell into the bed frame and hit her L back against the frame. Denies LOC or hitting her head. She waited to get back up but was unable to get up after a few minutes and called ambulance. Since then, she has pain in her L flank  which is exacerbated with any movement or deep inspiration. She has had multiple ED visits over the past year for mechanical falls. As per patient, she had a L hip fracture in 01/2024 and had a R hip fracture in 04/2024. She was recently in [facility]'s ED 3 days ago for a mechanical fall and was discharged straight from the ED. In the past, she was sent to [facility] after her admissions for her falls. She uses her walker at baseline and has a HHA 12hours daily. She also mentions losing 10 pounds in the past year, especially after her NSTEMI. She has been eating and hydrating well, she avoids gluten. Denies headache, dizziness, chest pain, SOB, abdominal pain, urinary symptoms. Denies numbness/tingling on extremities.     ED course:  VS: T 98.1 , HR 85  , /69, RR 18 , SpO2  99 % (RA)  Labs: CBC remarkable for Hgb 11.2, BMP unremarkable  Urine: N/a  EKG: N/A  Imaging: CT Head, Cervical spine, Thoracic spine with no acute intracranial hemorrhage, extra axial collection or acute abnormality. CT chest with no fracture or acute pathology.  Orders: Percocet x1   Ms. Lezama is a 82 yo F w/ PMHx of pelvic fracture 5/2024, HTN, NSTEMI s/p PCI 10/20/23 (Shockwave/DORITA x 2 pLAD), Meniere's disease, Vertigo, Depression, presents to  [facility] ED c/o mechanical fall that occurred this morning. As she was trying to get into her bed, she was backing into but fell into the bed frame and hit her L back against the frame. Denies LOC or hitting her head. She waited to get back up but was unable to get up after a few minutes and called ambulance. Since then, she has pain in her L flank  which is exacerbated with any movement or deep inspiration. She has had multiple ED visits over the past year for mechanical falls. As per patient, she had a L hip fracture in 01/2024 and had a R hip fracture in 04/2024. She was recently in [facility]'s ED 3 days ago for a mechanical fall and was discharged straight from the ED. In the past, she was sent to [facility] after her admissions for her falls. She uses her walker at baseline and has a HHA 12hours daily. She also mentions losing 10 pounds in the past year, especially after her NSTEMI. She has been eating and drinking well, she avoids gluten and processed foods. Denies headache, dizziness, chest pain, SOB, abdominal pain, urinary symptoms. Denies numbness/tingling on extremities.     ED course:  VS: T 98.1 , HR 85  , /69, RR 18 , SpO2  99 % (RA)  Labs: CBC remarkable for Hgb 11.2, BMP unremarkable  Urine: N/a  EKG: N/A  Imaging: CT Head, Cervical spine, Thoracic spine with no acute intracranial hemorrhage, extra axial collection or acute abnormality. CT chest with no fracture or acute pathology.  Orders: Percocet x1   Ms. Lezama is a 82 yo F w/ PMHx of pelvic fracture 5/2024, HTN, NSTEMI s/p PCI 10/20/23 (Shockwave/DORITA x 2 pLAD), Meniere's disease, Vertigo, Depression, presents to  [facility] ED c/o mechanical fall that occurred this morning. As she was trying to get into her bed, she was backing into but fell into the bed frame and hit her L back against the frame. Denies LOC or hitting her head. She waited to get back up but was unable to get up after a few minutes and called ambulance. Since then, she has pain in her L flank  which is exacerbated with any movement or deep inspiration. She has had multiple ED visits over the past year for mechanical falls. As per patient, she had a L hip fracture in 01/2024 and had a R hip fracture in 04/2024. She was recently in [facility]'s ED 3 days ago for a mechanical fall and was discharged straight from the ED. In the past, she was sent to [facility] after her admissions for her falls. She uses her walker at baseline and has a HHA 12hours daily. She also mentions losing 10 pounds in the past year, especially after her NSTEMI. She has been eating and drinking well, she avoids gluten and processed foods. Denies headache, dizziness, chest pain, SOB, abdominal pain, urinary symptoms. Denies numbness/tingling on extremities.     ED course:  VS: T 98.1 , HR 85  , /69, RR 18 , SpO2  99 % (RA)  Labs: CBC remarkable for Hgb 11.2, BMP unremarkable  Urine: N/a  EKG: NSR, 1st degree AV block  Imaging: CT Head, Cervical spine, Thoracic spine with no acute intracranial hemorrhage, extra axial collection or acute abnormality. CT chest with no fracture or acute pathology.  Orders: Percocet x1   Ms. Lezama is a 84 yo F w/ PMHx of pelvic fracture 4/2024, HTN, NSTEMI s/p PCI 10/20/23 (Shockwave/DORITA x 2 pLAD), Meniere's disease, Vertigo, Depression, presents to  [facility] ED c/o mechanical fall that occurred this morning. As she was trying to get into her bed, she was backing into but fell into the bed frame and hit her L back against the frame. Denies LOC or hitting her head. She waited to get back up but was unable to get up after a few minutes and called ambulance. Since then, she has pain in her L back/ flank  which is exacerbated with any movement or deep inspiration. She has had multiple ED visits over the past year for mechanical falls. As per patient, she had a L pelvic fracture in 04/2024. She was recently admitted in OSH (8/12, 04/2024) for mechanical falls as well.  In the past, she was sent to [facility] after her admissions for her falls. She uses her walker at baseline and has a HHA 12hours daily. She also mentions losing 10 pounds in the past year, especially after her NSTEMI. She has been eating and drinking well, she avoids gluten and processed foods. Denies headache, dizziness, chest pain, SOB, abdominal pain, urinary symptoms. Denies numbness/tingling on extremities.   PCP: Dr. Fisher    ED course:  VS: T 98.1 , HR 85  , /69, RR 18 , SpO2  99 % (RA)  Labs: CBC remarkable for Hgb 11.2, BMP unremarkable  Urine: N/a  EKG: NSR, 1st degree AV block  Imaging: CT Head, Cervical spine, Thoracic spine with no acute intracranial hemorrhage, extra axial collection or acute abnormality. CT chest with no fracture or acute pathology.  Orders: Percocet x1

## 2024-08-15 NOTE — ED ADULT NURSE NOTE - CHIEF COMPLAINT QUOTE
Pt presents to ED by EMS S/P mechanical fall at home getting into bed. States, " I banged my upper back on the side of the bed". Pt awake and alert has abrasion to L upper back on arrival. Denies head strike, LOC, CP, dizziness, Pt lives at home alone has HHA who was not home at time of fall as per pt. Pt states, " I have osteoporosis and have a fx to my pelvis for over a month, I banged my hip on the wall". Pt prescribed Plavix, oxycodone for pain as per EMS reportedly taken today PTA. Able to walk on scene as per EMS.

## 2024-08-15 NOTE — H&P ADULT - PROBLEM SELECTOR PLAN 1
Hx of recurrent mechanical falls; pelvic fracture (04/2024), recent admissions: 8/12 ([facility] ED)  Mechanical fall this morning while backing into her bed, denied LOC or hitting her head. likely secondary to deconditioning and hx of pelvic fracture vs. orthostasis vs. abnormal cardiac rhythms  CT head, cervical spine and thoracic spine with no acute intracranial hemorrhage, extra axial collection or acute   abnormality.  CT chest with no acute pathology    - Orthostatics  - EKG to rule out abnormal cardaic rhythms  - PT/OT eval  - pain management: tylenol standing, oxy prn Hx of recurrent mechanical falls; pelvic fracture (04/2024), recent admissions: 8/12 ([facility] ED)  Mechanical fall this morning while backing into her bed, denied LOC or hitting her head. likely secondary to deconditioning and hx of pelvic fracture vs. orthostasis vs. abnormal cardiac rhythms  CT head, cervical spine and thoracic spine with no acute intracranial hemorrhage, extra axial collection or acute   abnormality.  CT chest with no acute pathology  EKG with 1st degree AV block, pt asymptomatic    - Orthostatics  - PT/OT eval  - pain management: tylenol standing, oxy prn Hx of recurrent mechanical falls; pelvic fracture (04/2024), recent admissions: 8/12 ([facility] ED)  Mechanical fall this morning while backing into her bed, denied LOC or hitting her head. likely secondary to deconditioning and hx of pelvic fracture vs. orthostasis vs. abnormal cardiac rhythms  CT head, cervical spine and thoracic spine with no acute intracranial hemorrhage, extra axial collection or acute   abnormality.  CT chest with no acute pathology  EKG with 1st degree AV block, pt asymptomatic    - Orthostatics  - PT/OT eval  - pain management: tylenol for mild pain, oxy 2.5 mg prn for moderate pain, lidocaine patch Hx of recurrent mechanical falls; pelvic fracture (04/2024), recent admissions: 8/12 ([facility] ED)  Mechanical fall this morning while backing into her bed, denied LOC or hitting her head. likely secondary to deconditioning and hx of pelvic fracture vs. orthostasis vs. abnormal cardiac rhythms  CT head, cervical spine and thoracic spine with no acute intracranial hemorrhage, extra axial collection or acute   abnormality.  CT chest with no acute pathology  EKG with 1st degree AV block, pt asymptomatic  pt on home gabapentin (but unsure why she's taking it) - currently held iso falls    - Orthostatics  - PT/OT eval  - pain management: tylenol for mild pain, oxy 2.5 mg prn for moderate pain, lidocaine patch  - formal med rec AM Hx of recurrent mechanical falls; pelvic fracture (04/2024), recent admissions: 8/12 ([facility] ED)  Mechanical fall this morning while backing into her bed, denied LOC or hitting her head. likely secondary to deconditioning and hx of pelvic fracture vs. orthostasis vs. abnormal cardiac rhythms  CT head, cervical spine and thoracic spine with no acute intracranial hemorrhage, extra axial collection or acute   abnormality.  CT chest with no acute pathology  EKG with 1st degree AV block, pt asymptomatic  pt on home gabapentin (but unsure why she's taking it) - currently held iso falls    - Orthostatics  - PT/OT eval  - pain management  - IS   - formal med rec AM  - fall precautions , Hx of recurrent mechanical falls; likely secondary to chronic hip pain, pelvic fracture (04/2024), recent admissions: 8/12, 04/2024  Mechanical fall this morning while backing into her bed, denied LOC or hitting her head  CT head, cervical spine and thoracic spine with no acute intracranial hemorrhage, extra axial collection or acute   abnormality.  CT chest with no acute pathology  EKG with 1st degree AV block, pt asymptomatic  pt on home gabapentin (but unsure why she's taking it) - currently held iso falls    - Orthostatics  - PT/OT eval  - IS   - formal med rec AM  - fall precautions

## 2024-08-15 NOTE — H&P ADULT - PROBLEM SELECTOR PLAN 6
F-   E: replete K<4, Mg <2  N: DASH diet  DVT ppx: Lovenox  Dispo: RMF as per HIE, h/o migraine. pt not reporting any migraine symptoms currently  - on home topiramate 25mg daily    - Held home med given no symptoms recently denies dizziness, hearing loss, well controlled    - CTM

## 2024-08-15 NOTE — ED ADULT TRIAGE NOTE - CHIEF COMPLAINT QUOTE
Pt presents to ED S/P fall at home getting into bed. States, " I banged my upper back on the side of the bed". Pt has abrasion to L upper back on arrival. Denies head strike, LOC, CP, dizziness, Pt lives at home alone has HHA who was not home at time of fall as per pt. Pt states, " I have osteoporosis and have a fx to my pelvis for over a month, I banged my hip on the wall". Pt prescribed Plavix, oxycodone for pain as per EMS reportedly taken today PTA. Able to walk on scene as per EMS. Pt presents to ED by EMS S/P mechanical fall at home getting into bed. States, " I banged my upper back on the side of the bed". Pt awake and alert has abrasion to L upper back on arrival. Denies head strike, LOC, CP, dizziness, Pt lives at home alone has HHA who was not home at time of fall as per pt. Pt states, " I have osteoporosis and have a fx to my pelvis for over a month, I banged my hip on the wall". Pt prescribed Plavix, oxycodone for pain as per EMS reportedly taken today PTA. Able to walk on scene as per EMS. Pt presents to ED by EMS S/P mechanical fall at home getting into bed frame. States, " I banged my upper back on the side of the bed". Pt awake and alert has abrasion to L upper back on arrival. Denies head strike, LOC, CP, dizziness, Pt lives at home alone has HHA who was not home at time of fall as per pt. Pt states, " I have osteoporosis and have a fx to my pelvis for over a month, I banged my hip on the wall". Pt prescribed Plavix, oxycodone for pain as per EMS reportedly taken today PTA.

## 2024-08-15 NOTE — H&P ADULT - NSHPPHYSICALEXAM_GEN_ALL_CORE
PHYSICAL EXAM:  Constitutional: Resting comfortably in bed; NAD  Head: NC/AT  Eyes: PERRL, EOMI, clear conjunctiva  ENT: no nasal discharge; uvula midline, no oropharyngeal erythema or exudates; MMM  Neck: supple; no JVD or thyromegaly  Respiratory: CTA B/L; no W/R/R, no retractions  Cardiac: +S1/S2; RRR; no M/R/G;  Gastrointestinal: soft, NT/ND; no rebound or guarding; +BSx4  Extremities: WWP, no clubbing or cyanosis; no peripheral edema  Musculoskeletal: NROM x4; no joint swelling, tenderness or erythema; +L flank tenderness  Vascular: 2+ radial, femoral, DP/PT pulses B/L  Dermatologic: skin warm, dry and intact; no rashes, wounds, or scars  Neurologic: AAOx3; CNII-XII grossly intact; no focal deficits  Psychiatric: affect and characteristics of appearance, verbalizations, behaviors are appropriate PHYSICAL EXAM:  Constitutional: Resting comfortably in bed; NAD  Head: NC/AT  Eyes: PERRL, EOMI, clear conjunctiva  ENT: no nasal discharge; uvula midline, no oropharyngeal erythema or exudates; MMM  Neck: supple; no JVD or thyromegaly  Respiratory: CTA B/L; no W/R/R, no retractions  Cardiac: +S1/S2; RRR; no M/R/G;  Gastrointestinal: soft, NT/ND; no rebound or guarding; +BSx4  Extremities: WWP, no clubbing or cyanosis; no peripheral edema  Musculoskeletal: NROM x4; no joint swelling, tenderness or erythema; +L flank tenderness, no bruising noted  Vascular: 2+ radial, femoral, DP/PT pulses B/L  Dermatologic: skin warm, dry and intact; no rashes, wounds, or scars  Neurologic: AAOx3; CNII-XII grossly intact; no focal deficits  Psychiatric: affect and characteristics of appearance, verbalizations, behaviors are appropriate PHYSICAL EXAM:  Constitutional: Resting comfortably in bed; NAD  Head: NC/AT  Eyes: PERRL, EOMI, clear conjunctiva  ENT: no nasal discharge; uvula midline, no oropharyngeal erythema or exudates; MMM  Neck: supple; no JVD or thyromegaly  Respiratory: CTA B/L; no W/R/R, no retractions  Cardiac: +S1/S2; RRR; no M/R/G;  Gastrointestinal: soft, NT/ND; no rebound or guarding; +BSx4  Extremities: WWP, no clubbing or cyanosis; no peripheral edema  Musculoskeletal: NROM x4; no joint swelling, tenderness or erythema; +L back/ flank tenderness  Vascular: 2+ radial, femoral, DP/PT pulses B/L  Dermatologic: skin warm, dry and intact; no rashes, wounds, or scars  Neurologic: AAOx3; CNII-XII grossly intact; no focal deficits  Psychiatric: affect and characteristics of appearance, verbalizations, behaviors are appropriate

## 2024-08-15 NOTE — H&P ADULT - ASSESSMENT
Ms. Lezama is a 82 yo F w/ PMHx of pelvic fracture 5/2024, HTN, NSTEMI s/p PCI 10/20/23 (Shockwave/DORITA x 2 pLAD), Meniere's disease, Vertigo, Depression, who presents to  [facility] ED c/o mechanical fall, admitted for further workup and management

## 2024-08-15 NOTE — ED PROVIDER NOTE - HISTORY ATTESTATION, MLM
I have reviewed and confirmed nurses' notes... no dermatitis, no environmental allergies, no food allergies, no immunosuppressive disorder, and no pruritus.

## 2024-08-15 NOTE — ED PROVIDER NOTE - CLINICAL SUMMARY MEDICAL DECISION MAKING FREE TEXT BOX
84 y/o f hx HTN, CAD w/ stents, Meniere's disease, vertigo, anxiety, pelvic fracture 5/2024 presents c/o mechanical fall this morning.  No LOC, pt with left side posterior rib pain and back pain on exam, CTs chest and spine/head pending, given pain medications.  F/u imaging and reassess.

## 2024-08-15 NOTE — ED ADULT NURSE NOTE - NSFALLHARMRISKINTERV_ED_ALL_ED

## 2024-08-15 NOTE — H&P ADULT - PROBLEM SELECTOR PLAN 7
F-   E: replete K<4, Mg <2  N: DASH diet  DVT ppx: Lovenox  Dispo: RMF as per HIE, h/o migraine. pt not reporting any migraine symptoms currently  - on home topiramate 25mg daily    - Held home med given no symptoms recently

## 2024-08-15 NOTE — ED PROVIDER NOTE - MUSCULOSKELETAL, MLM
+TTP to mid thoracic spine and left posterior chest wall in area of ~9th rib, no crepitus, no overlying ecchymosis

## 2024-08-15 NOTE — PATIENT PROFILE ADULT - FALL HARM RISK - HARM RISK INTERVENTIONS

## 2024-08-16 ENCOUNTER — TRANSCRIPTION ENCOUNTER (OUTPATIENT)
Age: 84
End: 2024-08-16

## 2024-08-16 DIAGNOSIS — M54.9 DORSALGIA, UNSPECIFIED: ICD-10-CM

## 2024-08-16 LAB
ADD ON TEST-SPECIMEN IN LAB: SIGNIFICANT CHANGE UP
ADD ON TEST-SPECIMEN IN LAB: SIGNIFICANT CHANGE UP
ANION GAP SERPL CALC-SCNC: 9 MMOL/L — SIGNIFICANT CHANGE UP (ref 5–17)
BASOPHILS # BLD AUTO: 0.02 K/UL — SIGNIFICANT CHANGE UP (ref 0–0.2)
BASOPHILS NFR BLD AUTO: 0.3 % — SIGNIFICANT CHANGE UP (ref 0–2)
BUN SERPL-MCNC: 26 MG/DL — HIGH (ref 7–23)
CALCIUM SERPL-MCNC: 9 MG/DL — SIGNIFICANT CHANGE UP (ref 8.4–10.5)
CHLORIDE SERPL-SCNC: 107 MMOL/L — SIGNIFICANT CHANGE UP (ref 96–108)
CK SERPL-CCNC: 75 U/L — SIGNIFICANT CHANGE UP (ref 25–170)
CO2 SERPL-SCNC: 24 MMOL/L — SIGNIFICANT CHANGE UP (ref 22–31)
CREAT SERPL-MCNC: 0.69 MG/DL — SIGNIFICANT CHANGE UP (ref 0.5–1.3)
EGFR: 85 ML/MIN/1.73M2 — SIGNIFICANT CHANGE UP
EOSINOPHIL # BLD AUTO: 0.05 K/UL — SIGNIFICANT CHANGE UP (ref 0–0.5)
EOSINOPHIL NFR BLD AUTO: 0.8 % — SIGNIFICANT CHANGE UP (ref 0–6)
GLUCOSE SERPL-MCNC: 103 MG/DL — HIGH (ref 70–99)
HCT VFR BLD CALC: 35.3 % — SIGNIFICANT CHANGE UP (ref 34.5–45)
HGB BLD-MCNC: 11.3 G/DL — LOW (ref 11.5–15.5)
IMM GRANULOCYTES NFR BLD AUTO: 0.5 % — SIGNIFICANT CHANGE UP (ref 0–0.9)
LYMPHOCYTES # BLD AUTO: 1.19 K/UL — SIGNIFICANT CHANGE UP (ref 1–3.3)
LYMPHOCYTES # BLD AUTO: 19.9 % — SIGNIFICANT CHANGE UP (ref 13–44)
MAGNESIUM SERPL-MCNC: 1.8 MG/DL — SIGNIFICANT CHANGE UP (ref 1.6–2.6)
MCHC RBC-ENTMCNC: 28.3 PG — SIGNIFICANT CHANGE UP (ref 27–34)
MCHC RBC-ENTMCNC: 32 GM/DL — SIGNIFICANT CHANGE UP (ref 32–36)
MCV RBC AUTO: 88.5 FL — SIGNIFICANT CHANGE UP (ref 80–100)
MONOCYTES # BLD AUTO: 0.58 K/UL — SIGNIFICANT CHANGE UP (ref 0–0.9)
MONOCYTES NFR BLD AUTO: 9.7 % — SIGNIFICANT CHANGE UP (ref 2–14)
NEUTROPHILS # BLD AUTO: 4.12 K/UL — SIGNIFICANT CHANGE UP (ref 1.8–7.4)
NEUTROPHILS NFR BLD AUTO: 68.8 % — SIGNIFICANT CHANGE UP (ref 43–77)
NRBC # BLD: 0 /100 WBCS — SIGNIFICANT CHANGE UP (ref 0–0)
PHOSPHATE SERPL-MCNC: 3.8 MG/DL — SIGNIFICANT CHANGE UP (ref 2.5–4.5)
PLATELET # BLD AUTO: 313 K/UL — SIGNIFICANT CHANGE UP (ref 150–400)
POTASSIUM SERPL-MCNC: 3.9 MMOL/L — SIGNIFICANT CHANGE UP (ref 3.5–5.3)
POTASSIUM SERPL-SCNC: 3.9 MMOL/L — SIGNIFICANT CHANGE UP (ref 3.5–5.3)
RBC # BLD: 3.99 M/UL — SIGNIFICANT CHANGE UP (ref 3.8–5.2)
RBC # FLD: 15.2 % — HIGH (ref 10.3–14.5)
SODIUM SERPL-SCNC: 140 MMOL/L — SIGNIFICANT CHANGE UP (ref 135–145)
TSH SERPL-MCNC: 1.96 UIU/ML — SIGNIFICANT CHANGE UP (ref 0.27–4.2)
WBC # BLD: 5.99 K/UL — SIGNIFICANT CHANGE UP (ref 3.8–10.5)
WBC # FLD AUTO: 5.99 K/UL — SIGNIFICANT CHANGE UP (ref 3.8–10.5)

## 2024-08-16 PROCEDURE — 99233 SBSQ HOSP IP/OBS HIGH 50: CPT | Mod: GC

## 2024-08-16 PROCEDURE — 72170 X-RAY EXAM OF PELVIS: CPT | Mod: 26

## 2024-08-16 RX ORDER — GABAPENTIN 100 MG
0 CAPSULE ORAL
Refills: 0 | DISCHARGE

## 2024-08-16 RX ORDER — LIDOCAINE/BENZALKONIUM/ALCOHOL
1 SOLUTION, NON-ORAL TOPICAL DAILY
Refills: 0 | Status: DISCONTINUED | OUTPATIENT
Start: 2024-08-16 | End: 2024-08-18

## 2024-08-16 RX ORDER — POTASSIUM CHLORIDE 10 MEQ
10 TABLET, EXT RELEASE, PARTICLES/CRYSTALS ORAL ONCE
Refills: 0 | Status: COMPLETED | OUTPATIENT
Start: 2024-08-16 | End: 2024-08-16

## 2024-08-16 RX ORDER — ONDANSETRON 2 MG/ML
4 INJECTION, SOLUTION INTRAMUSCULAR; INTRAVENOUS ONCE
Refills: 0 | Status: COMPLETED | OUTPATIENT
Start: 2024-08-16 | End: 2024-08-16

## 2024-08-16 RX ORDER — THIAMINE HCL 250 MG
100 TABLET ORAL DAILY
Refills: 0 | Status: DISCONTINUED | OUTPATIENT
Start: 2024-08-16 | End: 2024-08-18

## 2024-08-16 RX ORDER — ENOXAPARIN SODIUM 100 MG/ML
30 INJECTION SUBCUTANEOUS
Refills: 0 | DISCHARGE

## 2024-08-16 RX ADMIN — SERTRALINE HYDROCHLORIDE 150 MILLIGRAM(S): 50 TABLET, FILM COATED ORAL at 11:06

## 2024-08-16 RX ADMIN — ACETAMINOPHEN 650 MILLIGRAM(S): 325 TABLET ORAL at 17:36

## 2024-08-16 RX ADMIN — ACETAMINOPHEN 650 MILLIGRAM(S): 325 TABLET ORAL at 23:49

## 2024-08-16 RX ADMIN — OXYCODONE HYDROCHLORIDE 10 MILLIGRAM(S): 5 TABLET ORAL at 01:43

## 2024-08-16 RX ADMIN — ONDANSETRON 4 MILLIGRAM(S): 2 INJECTION, SOLUTION INTRAMUSCULAR; INTRAVENOUS at 14:19

## 2024-08-16 RX ADMIN — Medication 100 MILLIGRAM(S): at 14:19

## 2024-08-16 RX ADMIN — Medication 1 PATCH: at 18:12

## 2024-08-16 RX ADMIN — Medication 1 TABLET(S): at 10:24

## 2024-08-16 RX ADMIN — Medication 1 PATCH: at 18:11

## 2024-08-16 RX ADMIN — Medication 25 GRAM(S): at 09:20

## 2024-08-16 RX ADMIN — Medication 75 MILLIGRAM(S): at 11:06

## 2024-08-16 RX ADMIN — BUPROPION HYDROCHLORIDE 300 MILLIGRAM(S): 150 TABLET ORAL at 11:06

## 2024-08-16 RX ADMIN — Medication 10 MILLIEQUIVALENT(S): at 09:20

## 2024-08-16 RX ADMIN — METOPROLOL TARTRATE 25 MILLIGRAM(S): 100 TABLET ORAL at 06:30

## 2024-08-16 RX ADMIN — Medication 81 MILLIGRAM(S): at 11:06

## 2024-08-16 RX ADMIN — Medication 40 MILLIGRAM(S): at 22:52

## 2024-08-16 RX ADMIN — ACETAMINOPHEN 650 MILLIGRAM(S): 325 TABLET ORAL at 11:06

## 2024-08-16 RX ADMIN — POLYETHYLENE GLYCOL 3350 17 GRAM(S): 17 POWDER, FOR SOLUTION ORAL at 17:36

## 2024-08-16 RX ADMIN — Medication 1 PATCH: at 16:15

## 2024-08-16 RX ADMIN — ENOXAPARIN SODIUM 30 MILLIGRAM(S): 100 INJECTION SUBCUTANEOUS at 22:52

## 2024-08-16 RX ADMIN — POLYETHYLENE GLYCOL 3350 17 GRAM(S): 17 POWDER, FOR SOLUTION ORAL at 06:30

## 2024-08-16 RX ADMIN — Medication 2 TABLET(S): at 22:52

## 2024-08-16 RX ADMIN — ACETAMINOPHEN 650 MILLIGRAM(S): 325 TABLET ORAL at 12:06

## 2024-08-16 RX ADMIN — ACETAMINOPHEN 650 MILLIGRAM(S): 325 TABLET ORAL at 18:36

## 2024-08-16 RX ADMIN — Medication 1 TABLET(S): at 14:19

## 2024-08-16 RX ADMIN — Medication 1 PATCH: at 11:07

## 2024-08-16 RX ADMIN — ACETAMINOPHEN 650 MILLIGRAM(S): 325 TABLET ORAL at 06:30

## 2024-08-16 NOTE — PHYSICAL THERAPY INITIAL EVALUATION ADULT - GENERAL OBSERVATIONS, REHAB EVAL
PT IE completed. Patient received semi supine in bed +heplock IV, +primafit, NAD, willing to work with PT.

## 2024-08-16 NOTE — PROGRESS NOTE ADULT - ATTENDING COMMENTS
Pain well controlled on current regimen, will also try lidocaine patch for back, will obtain orthostatics, folate, B12 and formal med rec to rule out other contributors to fall, imaging on admission negative for acute fractures, mechanical pelvic fractures present on admission and noted to be subacute on x-ray, discussed with ortho and ok for weight bearing, PT to work with patient.

## 2024-08-16 NOTE — PROGRESS NOTE ADULT - TIME BILLING
Bedside exam and interview   Reviewed vitals, labs   Discussed patient's plan of care with housestaff   Documentation of encounter  Excludes teaching and separately reported services

## 2024-08-16 NOTE — DIETITIAN INITIAL EVALUATION ADULT - OTHER INFO
Per H&P: Ms. Lezama is a 84 yo F w/ PMHx of pelvic fracture 4/2024, HTN, NSTEMI s/p PCI 10/20/23 (Shockwave/DORITA x 2 pLAD), Meniere's disease, Vertigo, Depression, presents to  [facility] ED c/o mechanical fall that occurred this morning. As she was trying to get into her bed, she was backing into but fell into the bed frame and hit her L back against the frame. Denies LOC or hitting her head. She waited to get back up but was unable to get up after a few minutes and called ambulance. Since then, she has pain in her L back/ flank  which is exacerbated with any movement or deep inspiration. She has had multiple ED visits over the past year for mechanical falls. As per patient, she had a L pelvic fracture in 04/2024. She was recently admitted in OSH (8/12, 04/2024) for mechanical falls as well.  In the past, she was sent to [facility] after her admissions for her falls. She uses her walker at baseline and has a HHA 12hours daily. She also mentions losing 10 pounds in the past year, especially after her NSTEMI. She has been eating and drinking well, she avoids gluten and processed foods. Denies headache, dizziness, chest pain, SOB, abdominal pain, urinary symptoms. Denies numbness/tingling on extremities.     Met with pt this AM at bedside. Pt was seated upright and able to articulate her nutrition hx well. 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 Pt reported low appetite and endorsed ~10 lb wt loss x 1 year; mentioned following a "balanced" diet at home. Pt endorsed current good appetite and mentioned she's hungry. Pt denied chewing/swallowing difficulties. Pt endorsed nausea/vomiting, which she mentioned was related to vertigo and does not believe it's nutrition/food related. Pt denied diarrhea, reported constipation, stated last BM 8/14.  Per H&P: Ms. Lezama is a 84 yo F w/ PMHx of pelvic fracture 4/2024, HTN, NSTEMI s/p PCI 10/20/23 (Shockwave/DORITA x 2 pLAD), Meniere's disease, Vertigo, Depression, presents to  [facility] ED c/o mechanical fall that occurred this morning. As she was trying to get into her bed, she was backing into but fell into the bed frame and hit her L back against the frame. Denies LOC or hitting her head. She waited to get back up but was unable to get up after a few minutes and called ambulance. Since then, she has pain in her L back/ flank  which is exacerbated with any movement or deep inspiration. She has had multiple ED visits over the past year for mechanical falls. As per patient, she had a L pelvic fracture in 04/2024. She was recently admitted in OSH (8/12, 04/2024) for mechanical falls as well.  In the past, she was sent to [facility] after her admissions for her falls. She uses her walker at baseline and has a HHA 12hours daily. She also mentions losing 10 pounds in the past year, especially after her NSTEMI. She has been eating and drinking well, she avoids gluten and processed foods. Denies headache, dizziness, chest pain, SOB, abdominal pain, urinary symptoms. Denies numbness/tingling on extremities.     Met with pt this AM at bedside. Pt was seated upright, appeared a bit disoriented, however able to articulate her nutrition hx well. 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 Pt reported low appetite and endorsed ~10 lb wt loss x 1 year; mentioned following a "balanced" diet at home. Pt endorsed current good appetite and mentioned she's hungry. Pt denied chewing/swallowing difficulties. Pt endorsed nausea/vomiting, which she mentioned was related to Menetrier/vertigo and does not believe it's nutrition/food related. Pt denied diarrhea, reported constipation, stated last BM 8/14. RD encouraged adequate fluids + fiber intake to assist with BMs. Pt reported usual body weight 104 pounds ~ 1 year ago and endorsed visual wt loss (14% wt loss x 1 year). RD reviewed protein sources (chicken, fish, beans, nut, etc.) and encouraged adequate PO and protein consumption in order to regain strength, maintain muscle mass and reach adequate nutritional status. Pt was accepting to RD suggestion and asking appropriate questions.     *Note: Pt at high risk for refeeding syndrome. Currently all lytes WNL (8/16); RD will continue to monitor.

## 2024-08-16 NOTE — PROGRESS NOTE ADULT - PROBLEM SELECTOR PLAN 2
acute L side back pain s/p mechanical fall  Imaging negative for rib fractures  - pain management acute L side back pain s/p mechanical fall  Imaging negative for rib fractures    Plan:  - pain management w/ tylenol for mild pain, oxy 5mg for moderate pain, oxy 10 mg for severe pain

## 2024-08-16 NOTE — DIETITIAN INITIAL EVALUATION ADULT - OTHER CALCULATIONS
*Using current weight as it is within % ideal body weight. Needs adjusted for malnutrition, older age. ideal body weight: 110 pounds; % ideal body weight: 89%

## 2024-08-16 NOTE — DIETITIAN INITIAL EVALUATION ADULT - PERTINENT MEDS FT
MEDICATIONS  (STANDING):  acetaminophen     Tablet .. 650 milliGRAM(s) Oral every 6 hours  aspirin enteric coated 81 milliGRAM(s) Oral daily  atorvastatin 40 milliGRAM(s) Oral at bedtime  buPROPion XL (24-Hour) . 300 milliGRAM(s) Oral daily  clopidogrel Tablet 75 milliGRAM(s) Oral daily  enoxaparin Injectable 30 milliGRAM(s) SubCutaneous every 24 hours  lidocaine   4% Patch 1 Patch Transdermal daily  metoprolol succinate ER 25 milliGRAM(s) Oral daily  polyethylene glycol 3350 17 Gram(s) Oral every 12 hours  senna 2 Tablet(s) Oral at bedtime  sertraline 150 milliGRAM(s) Oral daily  triamterene 37.5 mG/hydrochlorothiazide 25 mG Tablet 1 Tablet(s) Oral daily    MEDICATIONS  (PRN):  oxyCODONE    IR 5 milliGRAM(s) Oral every 6 hours PRN Moderate Pain (4 - 6)  oxyCODONE    IR 10 milliGRAM(s) Oral every 8 hours PRN Severe Pain (7 - 10)

## 2024-08-16 NOTE — PHYSICAL THERAPY INITIAL EVALUATION ADULT - BED MOBILITY LIMITATIONS, REHAB EVAL
Tolerated sitting EOB for ~2 minutes. Patient reporting significant pelvic pain and worsening nausea while seated EOB. Per RN Jace, patient with several emesis episodes prior to PT arrival 2/2 nausea. Patient requesting to defer further mobility at this time and return supine in bed./decreased ability to use arms for pushing/pulling/decreased ability to use legs for bridging/pushing/impaired ability to control trunk for mobility

## 2024-08-16 NOTE — DIETITIAN NUTRITION RISK NOTIFICATION - TREATMENT: THE FOLLOWING DIET HAS BEEN RECOMMENDED
Diet, Regular:   Supplement Feeding Modality:  Oral  Ensure Max Cans or Servings Per Day:  1       Frequency:  Two Times a day (08-16-24 @ 09:00) [Active]

## 2024-08-16 NOTE — PHYSICAL THERAPY INITIAL EVALUATION ADULT - PERTINENT HX OF CURRENT PROBLEM, REHAB EVAL
Ms. Lezama is a 82 yo F w/ PMHx of pelvic fracture 5/2024, HTN, NSTEMI s/p PCI 10/20/23 (Shockwave/DORITA x 2 pLAD), Meniere's disease, Vertigo, Depression, who presents to  [facility] ED c/o mechanical fall, admitted for further workup and management

## 2024-08-16 NOTE — DIETITIAN INITIAL EVALUATION ADULT - NS FNS DIET ORDER
Diet, Regular:   Supplement Feeding Modality:  Oral  Ensure Max Cans or Servings Per Day:  1       Frequency:  Three Times a day (08-16-24 @ 09:00)

## 2024-08-16 NOTE — DISCHARGE NOTE PROVIDER - NSDCCPCAREPLAN_GEN_ALL_CORE_FT
PRINCIPAL DISCHARGE DIAGNOSIS  Diagnosis: Fall  Assessment and Plan of Treatment: You were noted to have a history of falls. Please avoid any loose fitting clothing and remove any clutter on the ground at your residence and wear appropriate footwear to prevent any slips or falls. Please continue to use a cane and/or walker, whichever appropriate, for you to navigate.   We took some images of your head, neck, chest, and pelvis and they did not show any new fractures or signs of bleeding.   Please continue to advance your activity as tolerated.      SECONDARY DISCHARGE DIAGNOSES  Diagnosis: Major depression  Assessment and Plan of Treatment: Depression is a medical condition that causes feelings of sadness or hopelessness that do not go away. Depression may cause you to lose interest in things you used to enjoy and these feelings may interfere with your daily life. Please call 911 if you think about harming yourself or someone else. Please take your medication on a regular basis to improve or balance your mood and have regular follow-up with your PCP or psychiatrist so they can manage your care.      Diagnosis: HTN (hypertension)  Assessment and Plan of Treatment: You have a known history of high blood pressure prior to your admission. To manage this you are on a medication called ____. High blood pressure can cause damage to your heart and kidneys and increases your risk of heart attack and stroke. To avoid this, It is important that you continue to take this medication when you are discharged so that you can continue to control your blood pressure. Additionally be sure to follow up with your primary care physician on a regular basis to make sure your blood pressure continues to be well controlled. If you experience symptoms such as but not limited to: sudden onset blurry vision, nausea, vomiting, chest pain, shortness of breath, or palpitations, please go to the nearest emergency room.       PRINCIPAL DISCHARGE DIAGNOSIS  Diagnosis: Fall  Assessment and Plan of Treatment: You were noted to have a history of falls. Please avoid any loose fitting clothing and remove any clutter on the ground at your residence and wear appropriate footwear to prevent any slips or falls. Please continue to use a cane and/or walker, whichever appropriate, for you to navigate.   We took some images of your head, neck, chest, and pelvis and they did not show any new fractures or signs of bleeding.   Please continue to advance your activity as tolerated.      SECONDARY DISCHARGE DIAGNOSES  Diagnosis: Major depression  Assessment and Plan of Treatment: Depression is a medical condition that causes feelings of sadness or hopelessness that do not go away. Depression may cause you to lose interest in things you used to enjoy and these feelings may interfere with your daily life. Please call 911 if you think about harming yourself or someone else. Please take your medication on a regular basis to improve or balance your mood and have regular follow-up with your PCP or psychiatrist so they can manage your care.      Diagnosis: HTN (hypertension)  Assessment and Plan of Treatment: You have a known history of high blood pressure prior to your admission. High blood pressure can cause damage to your heart and kidneys and increases your risk of heart attack and stroke. To avoid this, It is important that you continue to take this medication when you are discharged so that you can continue to control your blood pressure. Additionally be sure to follow up with your primary care physician on a regular basis to make sure your blood pressure continues to be well controlled. If you experience symptoms such as but not limited to: sudden onset blurry vision, nausea, vomiting, chest pain, shortness of breath, or palpitations, please go to the nearest emergency room.       PRINCIPAL DISCHARGE DIAGNOSIS  Diagnosis: Fall  Assessment and Plan of Treatment: You were noted to have a history of falls. Please avoid any loose fitting clothing and remove any clutter on the ground at your residence and wear appropriate footwear to prevent any slips or falls. Please continue to use a cane and/or walker, whichever appropriate, for you to navigate.   We took some images of your head, neck, chest, and pelvis and they did not show any new fractures or signs of bleeding.   Please continue to advance your activity as tolerated.      SECONDARY DISCHARGE DIAGNOSES  Diagnosis: Major depression  Assessment and Plan of Treatment: Depression is a medical condition that causes feelings of sadness or hopelessness that do not go away. Depression may cause you to lose interest in things you used to enjoy and these feelings may interfere with your daily life. Please call 911 if you think about harming yourself or someone else. Please take your medication on a regular basis to improve or balance your mood and have regular follow-up with your PCP or psychiatrist so they can manage your care.      Diagnosis: HTN (hypertension)  Assessment and Plan of Treatment: You have a known history of high blood pressure prior to your admission. High blood pressure can cause damage to your heart and kidneys and increases your risk of heart attack and stroke. To avoid this, it is important that you continue to monitor your blood pressure. Additionally be sure to follow up with your primary care physician on a regular basis to make sure your blood pressure continues to be well controlled. If you experience symptoms such as but not limited to: sudden onset blurry vision, nausea, vomiting, chest pain, shortness of breath, or palpitations, please go to the nearest emergency room.

## 2024-08-16 NOTE — DIETITIAN NUTRITION RISK NOTIFICATION - ADDITIONAL COMMENTS/DIETITIAN RECOMMENDATIONS
1. Continue with Regular diet  - Encourage adequate PO and protein intake  - Honor food preferences, as medically able  2. Recommend Ensure Max BID  - Provides 150 kcal, 30 g pro per serving  3. Recommend MVI for general nutrient coverage  4. Recommend thiamin 100 mg/d in setting of malnutrition  5. Continue to monitor lytes, specifically Mg and Phos as pt is at high risk for refeeding, replete prn  6. Continue with current bowel regimen, prn  7. Appreciate weekly weight trends

## 2024-08-16 NOTE — DIETITIAN INITIAL EVALUATION ADULT - PERTINENT LABORATORY DATA
08-16    140  |  107  |  26<H>  ----------------------------<  103<H>  3.9   |  24  |  0.69    Ca    9.0      16 Aug 2024 05:30  Phos  3.8     08-16  Mg     1.8     08-16    A1C with Estimated Average Glucose Result: 4.7 % (10-17-23 @ 05:30)

## 2024-08-16 NOTE — DISCHARGE NOTE PROVIDER - NSDCACTIVITY_GEN_ALL_CORE
LOV 3/2/2022    RTC in 2 months     F/U not scheduled at this time; [employer] message sent (last active 5/31/2022)    Orders pending.
Activity as tolerated

## 2024-08-16 NOTE — PROGRESS NOTE ADULT - PROBLEM SELECTOR PLAN 1
, Hx of recurrent mechanical falls; likely secondary to chronic hip pain, pelvic fracture (04/2024), recent admissions: 8/12, 04/2024  Mechanical fall this morning while backing into her bed, denied LOC or hitting her head  CT head, cervical spine and thoracic spine with no acute intracranial hemorrhage, extra axial collection or acute   abnormality.  CT chest with no acute pathology  EKG with 1st degree AV block, pt asymptomatic  pt on home gabapentin (but unsure why she's taking it) - currently held iso falls    - Orthostatics  - PT/OT eval  - IS   - formal med rec AM  - fall precautions , Hx of recurrent mechanical falls; likely secondary to chronic hip pain, pelvic fracture (04/2024), recent admissions: 8/12, 04/2024  Mechanical fall this morning while backing into her bed, denied LOC or hitting her head  CT head, cervical spine and thoracic spine with no acute intracranial hemorrhage, extra axial collection or acute   abnormality.  CT chest with no acute pathology  EKG with 1st degree AV block, pt asymptomatic  pt on home gabapentin (but unsure why she's taking it) - currently held iso falls  PT/OT recommending YANIQUE    Plan:  - Orthostatics today  - Fall precautions in place

## 2024-08-16 NOTE — DIETITIAN INITIAL EVALUATION ADULT - PROBLEM SELECTOR PLAN 4
hx NSTEMI s/p PCI 10/20/23 (Shockwave/DORITA x 2 pLAD)  - on home Plavix, aspirin, statin, metoprolol    - Continue home meds

## 2024-08-16 NOTE — PROGRESS NOTE ADULT - PROBLEM SELECTOR PLAN 5
- on home sertraline and bupropion  -Qtc 417    - Continue sertraline 150mg  - Continue bupropion - on home sertraline and bupropion  - Qtc 417    Plan:   - c/w sertraline 150mg  - c/w bupropion

## 2024-08-16 NOTE — PROGRESS NOTE ADULT - PROBLEM SELECTOR PLAN 6
denies dizziness, hearing loss, well controlled    - CTM denies dizziness, hearing loss, well controlled    Plan:  - CTM

## 2024-08-16 NOTE — DIETITIAN INITIAL EVALUATION ADULT - NSFNSGIIOFT_GEN_A_CORE
Pt endorsed nausea/vomiting, which she mentioned was related to Menetrier/vertigo and does not believe it's nutrition/food related. Pt denied diarrhea, reported constipation, stated last BM 8/14.

## 2024-08-16 NOTE — CONSULT NOTE ADULT - ASSESSMENT
{\rtf1\srffnq32944\ansi\bzplvhd4576\ftnbj\uc1\deff0  {\fonttbl{\f0 \fnil Segoe UI;}{\f1 \fnil \fcharset0 Segoe UI;}{\f2 \fnil Times New Dylan;}}  {\colortbl ;\cpg368\txyrt008\akkn967 ;\red0\green0\blue0 ;\red0\green0\vuqg954 ;\red0\green0\blue0 ;}  {\stylesheet{\f0\fs20 Normal;}{\cs1 Default Paragraph Font;}{\cs2\f0\fs16 Line Number;}{\cs3\f2\fs24\ul\cf3 Hyperlink;}}  {\*\revtbl{Unknown;}}  \azigfr63045\mbffus02797\ycvac3354\qgjrf1039\eetee1552\bqowk7229\uqqfoob017\hfwqege981\nogrowautofit\resuws107\formshade\nofeaturethrottle1\dntblnsbdb\fet4\aendnotes\aftnnrlc\pgbrdrhead\pgbrdrfoot  \sectd\qiftiu04172\akupfe92103\guttersxn0\nhqquzyo5199\midzyvko5482\gtqphugw2927\aqlxjowt3702\tyrnzib787\tkutfxw126\sbkpage\pgncont\pgndec  \plain\plain\f0\fs24\ql\plain\f0\fs24\plain\f0\fs20\urqi3734\hich\f0\dbch\f0\loch\f0\fs20\par  I M\par  \par   83 y o F w/ PMHx of pelvic fracture 5/2024, HTN, NSTEMI s/p PCI 10/20/23 (Shockwave/DORITA x 2 pLAD), Meniere's disease, Vertigo, Depression, who presents to  [facility] ED c/o mechanical fall, admitted for further workup and management\par  \par  \plain\f1\fs20\ukzo5055\hich\f1\dbch\f1\loch\f1\cf2\fs20\ul{\field{\*\fldinst HYPERLINK [id number] }{\fldrslt Problem/Plan - 1:}}\plain\f0\fs20\pinm0972\hich\f0\dbch\f0\loch\f0\fs20\ql\par  \'b7  {\*\bkmkstart bd06932392020}{\*\bkmkend rm28543787240}Problem: {\*\bkmkstart py95486642616}{\*\bkmkend wu40346199824}Recurrent falls. \par  \'b7  {\*\bkmkstart oo33300971173}{\*\bkmkend uk26294861250}Plan: {\*\bkmkstart uf88647404553}{\*\bkmkend zo61465501808}, Hx of recurrent mechanical falls; likely secondary to chronic hip pain, pelvic fracture (04/2024), recent admissions: 8/12, 04/2024\par  Mechanical fall this morning while backing into her bed, denied LOC or hitting her head\par  CT head, cervical spine and thoracic spine with no acute intracranial hemorrhage, extra axial collection or acute \par  abnormality.\par  CT chest with no acute pathology\par  EKG with 1st degree AV block, pt asymptomatic\par  pt on home gabapentin (but unsure why she's taking it) - currently held iso falls\par  \par  - Orthostatics\par  - PT/OT eval\par  - IS \par  - formal med rec AM\par  - fall precautions.\plain\f1\fs20\efoj8206\hich\f1\dbch\f1\loch\f1\cf2\fs20\strike\plain\f0\fs20\crqs8881\hich\f0\dbch\f0\loch\f0\fs20\par  \par  \plain\f1\fs20\umhb9069\hich\f1\dbch\f1\loch\f1\cf2\fs20\ul{\field{\*\fldinst HYPERLINK [id number] }{\fldrslt Problem/Plan - 2:}}\plain\f0\fs20\ecqk7957\hich\f0\dbch\f0\loch\f0\fs20\ql\par  \'b7  {\*\bkmkstart yb47103001286}{\*\bkmkend tw91899212326}Problem: {\*\bkmkstart ac26760118607}{\*\bkmkend rf35231647701}Left-sided back pain.\plain\f1\fs20\vuzb3991\hich\f1\dbch\f1\loch\f1\cf2\fs20\strike\plain\f0\fs20\qyye5389\hich\f0\dbch\f0\loch\f0\fs20\par  \'b7  {\*\bkmkstart uv39739503833}{\*\bkmkend uc07040636654}Plan: {\*\bkmkstart lg66416212652}{\*\bkmkend ik49962414262}acute L side back pain s/p mechanical fall\par  Imaging negative for rib fractures\par  - pain management.\plain\f1\fs20\mlxp8195\hich\f1\dbch\f1\loch\f1\cf2\fs20\strike\plain\f0\fs20\wria4736\hich\f0\dbch\f0\loch\f0\fs20\par  \par  \plain\f1\fs20\iuwk5607\hich\f1\dbch\f1\loch\f1\cf2\fs20\ul{\field{\*\fldinst HYPERLINK [id number] }{\fldrslt Problem/Plan - 3:}}\plain\f0\fs20\qpor0287\hich\f0\dbch\f0\loch\f0\fs20\ql\par  \'b7  {\*\bkmkstart gj14748252240}{\*\bkmkend pv98119638481}Problem: {\*\bkmkstart py79920439583}{\*\bkmkend tq76138403526}HTN (hypertension).\plain\f1\fs20\sxpz5324\hich\f1\dbch\f1\loch\f1\cf2\fs20\strike\plain\f0\fs20\rtkt5020\hich\f0\dbch\f0\loch\f0\fs20\par  \'b7  {\*\bkmkstart ca67773487144}{\*\bkmkend eg24767744010}Plan: {\*\bkmkstart mr68767104520}{\*\bkmkend vl21826542603}- on home metoprolol and triamterene/HCTZ\par  \par  - Continue home meds.\plain\f1\fs20\lscj7421\hich\f1\dbch\f1\loch\f1\cf2\fs20\strike\plain\f0\fs20\onlx2101\hich\f0\dbch\f0\loch\f0\fs20\par  \par  \plain\f1\fs20\lqte4190\hich\f1\dbch\f1\loch\f1\cf2\fs20\ul{\field{\*\fldinst HYPERLINK [id number] }{\fldrslt Problem/Plan - 4:}}\plain\f0\fs20\tdcx2874\hich\f0\dbch\f0\loch\f0\fs20\ql\par  \'b7  {\*\bkmkstart oc49394411223}{\*\bkmkend hl17768417212}Problem: {\*\bkmkstart ty83921419219}{\*\bkmkend ri44166137199}CAD (coronary artery disease).\plain\f1\fs20\dcki5871\hich\f1\dbch\f1\loch\f1\cf2\fs20\strike\plain\f0\fs20\ppcy8166\hich\f0\dbch\f0\loch\f0\fs20\par  \'b7  {\*\bkmkstart tz82404444975}{\*\bkmkend xz11790140938}Plan: {\*\bkmkstart ba53003490317}{\*\bkmkend kq65832478227}hx NSTEMI s/p PCI 10/20/23 (Shockwave/DORITA x 2 pLAD)\par  - on home Plavix, aspirin, statin, metoprolol\par  \par  - Continue home meds.\plain\f1\fs20\hies9143\hich\f1\dbch\f1\loch\f1\cf2\fs20\strike\plain\f0\fs20\ggnc3639\hich\f0\dbch\f0\loch\f0\fs20\par  \par  \plain\f1\fs20\vspz1501\hich\f1\dbch\f1\loch\f1\cf2\fs20\ul{\field{\*\fldinst HYPERLINK [id number] }{\fldrslt Problem/Plan - 5:}}\plain\f0\fs20\heis9681\hich\f0\dbch\f0\loch\f0\fs20\ql\par  \'b7  {\*\bkmkstart pt40550602462}{\*\bkmkend dk13042107893}Problem: {\*\bkmkstart fg57027361297}{\*\bkmkend tj06188287831}Major depression.\plain\f1\fs20\szqh6862\hich\f1\dbch\f1\loch\f1\cf2\fs20\strike\plain\f0\fs20\ntpr1797\hich\f0\dbch\f0\loch\f0\fs20\par  \'b7  {\*\bkmkstart mn80606205821}{\*\bkmkend hi13191187666}Plan: {\*\bkmkstart ve29144982125}{\*\bkmkend zs31529375678}- on home sertraline and bupropion\par  -Qtc 417\par  \par  - Continue sertraline 150mg\par  - Continue bupropion.\plain\f1\fs20\nvpa9110\hich\f1\dbch\f1\loch\f1\cf2\fs20\strike\plain\f0\fs20\wbbw5219\hich\f0\dbch\f0\loch\f0\fs20\par  \par  \plain\f1\fs20\vfis2566\hich\f1\dbch\f1\loch\f1\cf2\fs20\ul{\field{\*\fldinst HYPERLINK [id number] }{\fldrslt Problem/Plan - 6:}}\plain\f0\fs20\cqcg7956\hich\f0\dbch\f0\loch\f0\fs20\ql\par  \'b7  {\*\bkmkstart nu83037459115}{\*\bkmkend rp04860731461}Problem: {\*\bkmkstart vv97555988643}{\*\bkmkend ce61852072286}H/O Meniere's disease.\plain\f1\fs20\roxm4757\hich\f1\dbch\f1\loch\f1\cf2\fs20\strike\plain\f0\fs20\thzx4117\hich\f0\dbch\f0\loch\f0\fs20\par  \'b7  {\*\bkmkstart qa92036037577}{\*\bkmkend cr42150773434}Plan: {\*\bkmkstart ud09801691020}{\*\bkmkend lq21230869173}denies dizziness, hearing loss, well controlled\par  \par  - CTM.\plain\f1\fs20\jbnk9262\hich\f1\dbch\f1\loch\f1\cf2\fs20\strike\plain\f0\fs20\nfzk0490\hich\f0\dbch\f0\loch\f0\fs20\par  \par  \plain\f1\fs20\pqkl8017\hich\f1\dbch\f1\loch\f1\cf2\fs20\ul{\field{\*\fldinst HYPERLINK [id number] }{\fldrslt Problem/Plan - 7:}}\plain\f0\fs20\ebfg6953\hich\f0\dbch\f0\loch\f0\fs20\ql\par  \'b7  {\*\bkmkstart ts11577762127}{\*\bkmkend dv22516661194}Problem: {\*\bkmkstart sr35911321926}{\*\bkmkend iy51373203855}H/O migraine.\plain\f1\fs20\lpvv9515\hich\f1\dbch\f1\loch\f1\cf2\fs20\strike\plain\f0\fs20\mnxk3688\hich\f0\dbch\f0\loch\f0\fs20\par  \'b7  {\*\bkmkstart ve77863780770}{\*\bkmkend jj01588182576}Plan: {\*\bkmkstart mg10875087604}{\*\bkmkend pd15738628004}as per HIE, h/o migraine. pt not reporting any migraine symptoms currently\par  - on home topiramate 25mg daily\par  \par  - Held home med given no symptoms recently.\plain\f1\fs20\rlui7612\hich\f1\dbch\f1\loch\f1\cf2\fs20\strike\plain\f0\fs20\rbof6752\hich\f0\dbch\f0\loch\f0\fs20\par  \par  \plain\f1\fs20\lkfy6991\hich\f1\dbch\f1\loch\f1\cf2\fs20\ul{\field{\*\fldinst HYPERLINK [id number] }{\fldrslt Problem/Plan - 8:}}\plain\f0\fs20\fnva1293\hich\f0\dbch\f0\loch\f0\fs20\ql\par  \'b7  {\*\bkmkstart tg34070485138}{\*\bkmkend hv74750462951}Problem: {\*\bkmkstart ts44127673863}{\*\bkmkend sw17792275093}Prophylactic measure. \par  \'b7  {\*\bkmkstart ec41700892993}{\*\bkmkend ko70559134773}Plan: {\*\bkmkstart fo08558883830}{\*\bkmkend gb85114082022}F- \par  E: replete K<4, Mg <2\par  N: DASH diet\par  DVT ppx: Lovenox\par  Dispo: RMF{\*\bkmkstart bkcommentCR}{\*\bkmkend bkcommentCR}.\par  \par  \par  \par  \par  \par  \par  \par  \plain\f1\fs16\mchm4566\hich\f1\dbch\f1\loch\f1\cf2\fs16\par  \par  \par  \par  \par  \par  \par  \par  \plain\f0\fs20\zfth4393\hich\f0\dbch\f0\loch\f0\fs20\par  }

## 2024-08-16 NOTE — PROGRESS NOTE ADULT - SUBJECTIVE AND OBJECTIVE BOX
OVERNIGHT EVENTS:    SUBJECTIVE / INTERVAL HPI: Patient seen and examined at bedside.       PHYSICAL EXAM:    General: Lying comfortably and in no acute distress  HEENT: NC/AT; EOMI, anicteric sclera; MMM  Neck: supple  Cardiovascular: +S1/S2, RRR  Respiratory: CTA B/L; no W/R/R  Gastrointestinal: soft, NT/ND; +BSx4  Extremities: WWP; no edema, clubbing or cyanosis  Vascular: 2+ radial pulses B/L  Neurological: AAOx3; no focal deficits  Psychiatric: pleasant mood and affect  Dermatologic: no appreciable wounds or damage to the skin    VITAL SIGNS:  Vital Signs Last 24 Hrs  T(C): 36.4 (16 Aug 2024 05:38), Max: 36.7 (15 Aug 2024 13:17)  T(F): 97.6 (16 Aug 2024 05:38), Max: 98.1 (15 Aug 2024 13:17)  HR: 62 (16 Aug 2024 05:38) (62 - 85)  BP: 123/69 (16 Aug 2024 05:38) (123/69 - 169/84)  BP(mean): --  RR: 18 (16 Aug 2024 05:38) (16 - 18)  SpO2: 96% (16 Aug 2024 05:38) (96% - 99%)    Parameters below as of 15 Aug 2024 21:45  Patient On (Oxygen Delivery Method): room air          MEDICATIONS:  MEDICATIONS  (STANDING):  acetaminophen     Tablet .. 650 milliGRAM(s) Oral every 6 hours  aspirin enteric coated 81 milliGRAM(s) Oral daily  atorvastatin 40 milliGRAM(s) Oral at bedtime  buPROPion XL (24-Hour) . 300 milliGRAM(s) Oral daily  clopidogrel Tablet 75 milliGRAM(s) Oral daily  enoxaparin Injectable 30 milliGRAM(s) SubCutaneous every 24 hours  lidocaine   4% Patch 1 Patch Transdermal daily  metoprolol succinate ER 25 milliGRAM(s) Oral daily  polyethylene glycol 3350 17 Gram(s) Oral every 12 hours  senna 2 Tablet(s) Oral at bedtime  sertraline 150 milliGRAM(s) Oral daily  triamterene 37.5 mG/hydrochlorothiazide 25 mG Tablet 1 Tablet(s) Oral daily    MEDICATIONS  (PRN):  oxyCODONE    IR 10 milliGRAM(s) Oral every 8 hours PRN Severe Pain (7 - 10)  oxyCODONE    IR 5 milliGRAM(s) Oral every 6 hours PRN Moderate Pain (4 - 6)      ALLERGIES:  Allergies    penicillins (Unknown)  codeine (Unknown)    Intolerances        LABS:                        11.3   5.99  )-----------( 313      ( 16 Aug 2024 05:30 )             35.3     08-16    140  |  107  |  x   ----------------------------<  103<H>  3.9   |  24  |  x     Ca    9.0      16 Aug 2024 05:30  Phos  3.8     08-16  Mg     1.8     08-16        Urinalysis Basic - ( 16 Aug 2024 05:30 )    Color: x / Appearance: x / SG: x / pH: x  Gluc: 103 mg/dL / Ketone: x  / Bili: x / Urobili: x   Blood: x / Protein: x / Nitrite: x   Leuk Esterase: x / RBC: x / WBC x   Sq Epi: x / Non Sq Epi: x / Bacteria: x      CAPILLARY BLOOD GLUCOSE          RADIOLOGY & ADDITIONAL TESTS: Reviewed. OVERNIGHT EVENTS: ANTWAN    SUBJECTIVE / INTERVAL HPI: Patient seen and examined at bedside. Patient admits to ongoing pain in her L back, flank, and hip since sustaining her fall yesterday. She also notes she is unable to take a full breath 2/2 pain in her L back. She denies any HA, dizziness, fever, chills, N/V, weakness, sensory loss, CP, SOB, cough, abdominal pain, or LE swelling.     PHYSICAL EXAM:    General: Lying in bed in mild discomfort  HEENT: NC/AT; EOMI, anicteric sclera; MMM  Neck: supple  Cardiovascular: +S1/S2, RRR  Respiratory: notable splinting, lungs CTA, no W/R/R, no iWOB  Back: Tenderness to palpation of the L back with 2 superficial abrasions   Gastrointestinal: soft, NT/ND; +BSx4  Extremities: WWP; no edema, clubbing or cyanosis  Vascular: 2+ radial pulses B/L  Neurological: AAOx3; no focal deficits  Psychiatric: pleasant mood and affect  Dermatologic: no appreciable wounds or damage to the skin    VITAL SIGNS:  Vital Signs Last 24 Hrs  T(C): 36.4 (16 Aug 2024 05:38), Max: 36.7 (15 Aug 2024 13:17)  T(F): 97.6 (16 Aug 2024 05:38), Max: 98.1 (15 Aug 2024 13:17)  HR: 62 (16 Aug 2024 05:38) (62 - 85)  BP: 123/69 (16 Aug 2024 05:38) (123/69 - 169/84)  BP(mean): --  RR: 18 (16 Aug 2024 05:38) (16 - 18)  SpO2: 96% (16 Aug 2024 05:38) (96% - 99%)    Parameters below as of 15 Aug 2024 21:45  Patient On (Oxygen Delivery Method): room air          MEDICATIONS:  MEDICATIONS  (STANDING):  acetaminophen     Tablet .. 650 milliGRAM(s) Oral every 6 hours  aspirin enteric coated 81 milliGRAM(s) Oral daily  atorvastatin 40 milliGRAM(s) Oral at bedtime  buPROPion XL (24-Hour) . 300 milliGRAM(s) Oral daily  clopidogrel Tablet 75 milliGRAM(s) Oral daily  enoxaparin Injectable 30 milliGRAM(s) SubCutaneous every 24 hours  lidocaine   4% Patch 1 Patch Transdermal daily  metoprolol succinate ER 25 milliGRAM(s) Oral daily  polyethylene glycol 3350 17 Gram(s) Oral every 12 hours  senna 2 Tablet(s) Oral at bedtime  sertraline 150 milliGRAM(s) Oral daily  triamterene 37.5 mG/hydrochlorothiazide 25 mG Tablet 1 Tablet(s) Oral daily    MEDICATIONS  (PRN):  oxyCODONE    IR 10 milliGRAM(s) Oral every 8 hours PRN Severe Pain (7 - 10)  oxyCODONE    IR 5 milliGRAM(s) Oral every 6 hours PRN Moderate Pain (4 - 6)      ALLERGIES:  Allergies    penicillins (Unknown)  codeine (Unknown)    Intolerances        LABS:                        11.3   5.99  )-----------( 313      ( 16 Aug 2024 05:30 )             35.3     08-16    140  |  107  |  x   ----------------------------<  103<H>  3.9   |  24  |  x     Ca    9.0      16 Aug 2024 05:30  Phos  3.8     08-16  Mg     1.8     08-16        Urinalysis Basic - ( 16 Aug 2024 05:30 )    Color: x / Appearance: x / SG: x / pH: x  Gluc: 103 mg/dL / Ketone: x  / Bili: x / Urobili: x   Blood: x / Protein: x / Nitrite: x   Leuk Esterase: x / RBC: x / WBC x   Sq Epi: x / Non Sq Epi: x / Bacteria: x      CAPILLARY BLOOD GLUCOSE          RADIOLOGY & ADDITIONAL TESTS: Reviewed.

## 2024-08-16 NOTE — DISCHARGE NOTE PROVIDER - DETAILS OF MALNUTRITION DIAGNOSIS/DIAGNOSES
This patient has been assessed with a concern for Malnutrition and was treated during this hospitalization for the following Nutrition diagnosis/diagnoses:     -  08/16/2024: Severe protein-calorie malnutrition   -  08/16/2024: Underweight (BMI < 19)

## 2024-08-16 NOTE — PHYSICAL THERAPY INITIAL EVALUATION ADULT - ADDITIONAL COMMENTS
Limited community ambulator who lives alone in elevator access apartment, no ROJAS. Ambulates with rollator and SC. Has [facility] 12 hours/day for 7 days/week to assist with all ADLs as needed.

## 2024-08-16 NOTE — CONSULT NOTE ADULT - SUBJECTIVE AND OBJECTIVE BOX
Patient is a 83y old  Female who presents with a chief complaint of Fall (16 Aug 2024 06:47)      HPI:  Ms. Lezama is a 84 yo F w/ PMHx of pelvic fracture 4/2024, HTN, NSTEMI s/p PCI 10/20/23 (Shockwave/DORITA x 2 pLAD), Meniere's disease, Vertigo, Depression, presents to  [facility] ED c/o mechanical fall that occurred this morning. As she was trying to get into her bed, she was backing into but fell into the bed frame and hit her L back against the frame. Denies LOC or hitting her head. She waited to get back up but was unable to get up after a few minutes and called ambulance. Since then, she has pain in her L back/ flank  which is exacerbated with any movement or deep inspiration. She has had multiple ED visits over the past year for mechanical falls. As per patient, she had a L pelvic fracture in 04/2024. She was recently admitted in OSH (8/12, 04/2024) for mechanical falls as well.  In the past, she was sent to [facility] after her admissions for her falls. She uses her walker at baseline and has a HHA 12hours daily. She also mentions losing 10 pounds in the past year, especially after her NSTEMI. She has been eating and drinking well, she avoids gluten and processed foods. Denies headache, dizziness, chest pain, SOB, abdominal pain, urinary symptoms. Denies numbness/tingling on extremities.   PCP: Dr. Fisher    ED course:  VS: T 98.1 , HR 85  , /69, RR 18 , SpO2  99 % (RA)  Labs: CBC remarkable for Hgb 11.2, BMP unremarkable  Urine: N/a  EKG: NSR, 1st degree AV block  Imaging: CT Head, Cervical spine, Thoracic spine with no acute intracranial hemorrhage, extra axial collection or acute abnormality. CT chest with no fracture or acute pathology.  Orders: Percocet x1   (15 Aug 2024 18:31)    PAST MEDICAL & SURGICAL HISTORY:  Menetrier disease      Anxiety disorder      Vertigo      CAD (coronary artery disease)      HTN (hypertension)      NSTEMI (non-ST elevation myocardial infarction)      S/P right knee surgery      H/O fracture of right hip        MEDICATIONS  (STANDING):  acetaminophen     Tablet .. 650 milliGRAM(s) Oral every 6 hours  aspirin enteric coated 81 milliGRAM(s) Oral daily  atorvastatin 40 milliGRAM(s) Oral at bedtime  buPROPion XL (24-Hour) . 300 milliGRAM(s) Oral daily  clopidogrel Tablet 75 milliGRAM(s) Oral daily  enoxaparin Injectable 30 milliGRAM(s) SubCutaneous every 24 hours  lidocaine   4% Patch 1 Patch Transdermal daily  magnesium sulfate  IVPB 2 Gram(s) IV Intermittent once  metoprolol succinate ER 25 milliGRAM(s) Oral daily  polyethylene glycol 3350 17 Gram(s) Oral every 12 hours  potassium chloride    Tablet ER 10 milliEquivalent(s) Oral once  senna 2 Tablet(s) Oral at bedtime  sertraline 150 milliGRAM(s) Oral daily  triamterene 37.5 mG/hydrochlorothiazide 25 mG Tablet 1 Tablet(s) Oral daily    MEDICATIONS  (PRN):  oxyCODONE    IR 10 milliGRAM(s) Oral every 8 hours PRN Severe Pain (7 - 10)  oxyCODONE    IR 5 milliGRAM(s) Oral every 6 hours PRN Moderate Pain (4 - 6)          Home Living Status :  lives alone in an elevator accessible apartment building          -  Home Services : 8 hrs x  7 days/week         -  Family support : son, lives in [place]          -  Occupation :     Baseline Functional Level Prior to Admission :             - ADL's/ IADL's :  independent           - Ambulatory status prior to admission :   walked with a walker         FAMILY HISTORY:      CBC Full  -  ( 16 Aug 2024 05:30 )  WBC Count : 5.99 K/uL  RBC Count : 3.99 M/uL  Hemoglobin : 11.3 g/dL  Hematocrit : 35.3 %  Platelet Count - Automated : 313 K/uL  Mean Cell Volume : 88.5 fl  Mean Cell Hemoglobin : 28.3 pg  Mean Cell Hemoglobin Concentration : 32.0 gm/dL  Auto Neutrophil # : 4.12 K/uL  Auto Lymphocyte # : 1.19 K/uL  Auto Monocyte # : 0.58 K/uL  Auto Eosinophil # : 0.05 K/uL  Auto Basophil # : 0.02 K/uL  Auto Neutrophil % : 68.8 %  Auto Lymphocyte % : 19.9 %  Auto Monocyte % : 9.7 %  Auto Eosinophil % : 0.8 %  Auto Basophil % : 0.3 %      08-16    140  |  107  |  26<H>  ----------------------------<  103<H>  3.9   |  24  |  0.69    Ca    9.0      16 Aug 2024 05:30  Phos  3.8     08-16  Mg     1.8     08-16        Urinalysis Basic - ( 16 Aug 2024 05:30 )    Color: x / Appearance: x / SG: x / pH: x  Gluc: 103 mg/dL / Ketone: x  / Bili: x / Urobili: x   Blood: x / Protein: x / Nitrite: x   Leuk Esterase: x / RBC: x / WBC x   Sq Epi: x / Non Sq Epi: x / Bacteria: x        Radiology :     < from: CT Head No Cont (08.15.24 @ 14:42) >  ACC: [id number] EXAM:  CT THORACIC SPINE   ORDERED BY: DOMINIK BORGES     ACC: [id number] EXAM:  CT CERVICAL SPINE   ORDERED BY: DOMINIK BORGES     ACC: [id number] EXAM:  CT BRAIN   ORDERED BY: DOMINIK BORGES     PROCEDURE DATE:  08/15/2024          INTERPRETATION:  CLINICAL INFORMATION: Status post fall, osteoporosis    COMPARISON: CT head and C-spine 11/27/2023    CONTRAST:  IV Contrast: None  Complications: None    TECHNIQUE:    CT BRAIN: Serial axial images were obtained from the skull base to the   vertex using multi-slice helical technique. Sagittal and coronal   reformats were obtained.    CT SPINE: Axial images were obtained of the cervical and thoracic spine   using multislice helical technique. Reformatted coronal and sagittal   images were obtained.    FINDINGS:    CT BRAIN:    VENTRICLES AND SULCI: Age appropriate involutional changes.    INTRA-AXIAL: No mass effect, acute hemorrhage, or midline shift.  There   are periventricular and subcortical white matter hypodensities,   consistent with microvascular type changes.    EXTRA-AXIAL: No mass or fluid collection. Basal cisterns are normal in   appearance.    VISUALIZED SINUSES:  Clear.    TYMPANOMASTOID CAVITIES:  Clear.    VISUALIZED ORBITS: Bilateral lens replacement.    CALVARIUM: No calvarial fracture.      CT CERVICAL SPINE    VERTEBRAE:  No acute fracture. No jumped or perched facets. Degenerative   fusion of the C4 and C5 vertebral bodies. Degenerative anchylosis of the   left C7-T1 facet joint. Moderatecalcified retrodental pannus dorsal to   the dens of axis.    ALIGNMENT: Reversal of the normal cervical lordosis at C3-C4. Trace   anterolisthesis of C3 upon C4. Trace retrolisthesis of C4 upon C5.    INTERVERTEBRAL DISC SPACES: Multilevel disc space narrowing worse at   C4-C5 where there is bony bridging across the joint.    STENOSIS: Mild to moderate cervical canal stenosis of the C4 level   secondary to retrolisthesis. No extra-axial collection or hematoma.    SOFT TISSUES: No prevertebral edema.      CT THORACIC SPINE:    VERTEBRAE:  Normal in height. No acute fracture. Multilevel degenerative   changes including marginal osteophytes and vacuum disc phenomenon.    ALIGNMENT: Exaggeration of normal thoracic kyphosis. No subluxation or   scoliosis.    INTERVERTEBRAL DISC SPACES: No significant disc bulge or focal disc   herniation. No significant spinal canal stenosis.    VISUALIZED LUNGS: No suspicious nodules.    MISCELLANEOUS:  Aortic calcifications. Coronary artery   stents/calcifications. Please see dedicated CT chest for further thoracic   findings.    IMPRESSION:      1. No acute intracranial hemorrhage, extra axial collection or acute   abnormality.  2. Degenerative changes of the cervical and thoracic spine without acute   abnormality.    < from: CT Chest No Cont (08.15.24 @ 14:42) >    ACC: [id number] EXAM:  CT CHEST   ORDERED BY: DOMINIK BORGES     PROCEDURE DATE:  08/15/2024          INTERPRETATION:  CLINICAL INFORMATION: Left posterior chest wall pain.   Status post fall.    COMPARISON: CT chest November 20, 2023, CTA coronary October 17, 2023,   CTA chest October 16, 2023.    CONTRAST/COMPLICATIONS:  IV Contrast: None  Oral Contrast: None  Complications: None    PROCEDURE:  CT of the Chest was performed.  Sagittal and coronal reformats were performed.    FINDINGS:    LUNGS AND LARGE AIRWAYS: Patent central airways. Stable small airways   disease cluster of micronodules in tree-in-bud pattern in right middle   lobe and chronic mucoid impaction in inferior lingula.  Mild bilateral   cylindrical bronchiectasis.  PLEURA: No pneumothorax or pleural effusion.  VESSELS: Aortic calcifications. Coronary artery stenting/calcifications.   No thoracic aortic aneurysm.  HEART: Heart size is normal. No pericardial effusion.  MEDIASTINUM AND MADHAVI: No lymphadenopathy.  CHEST WALL AND LOWER NECK: Within normal limits.  VISUALIZED UPPER ABDOMEN: Within normal limits.  BONES: Degenerative changes. Old healed sternal fracture.    IMPRESSION:  No fracture or acute traumatic pathology.         Review of Systems : per HPI         Vital Signs Last 24 Hrs  T(C): 36.4 (16 Aug 2024 05:38), Max: 36.7 (15 Aug 2024 13:17)  T(F): 97.6 (16 Aug 2024 05:38), Max: 98.1 (15 Aug 2024 13:17)  HR: 62 (16 Aug 2024 05:38) (62 - 85)  BP: 123/69 (16 Aug 2024 05:38) (123/69 - 169/84)  BP(mean): --  RR: 18 (16 Aug 2024 05:38) (16 - 18)  SpO2: 96% (16 Aug 2024 05:38) (96% - 99%)    Parameters below as of 15 Aug 2024 21:45  Patient On (Oxygen Delivery Method): room air            Physical Exam:   83 y o woman lying comfortably in semi Vital's position , awake , alert , no acute complaints     Head: normocephalic , atraumatic    Eyes: PERRLA , EOMI , no nystagmus , sclera anicteric    ENT / FACE: neg nasal discharge , uvula midline , no oropharyngeal erythema / exudate    Neck: supple , negative JVD , negative carotid bruits , no thyromegaly    Chest: CTA bilaterally , neg wheeze / rhonchi / rales / crackles / egophany    Cardiovascular: regular rate and rhythm , neg murmurs / rubs / gallops    Abdomen: soft , non distended , no tenderness to palpation in all 4 quadrants ,  normal bowel sounds     Extremities: WWP , neg cyanosis /clubbing / edema     Neurologic Exam:     Alert and oriented to person , place , date/year , speech fluent w/o dysarthria , follows commands , recent and remote memory intact , repetition intact , comprehension intact ,  attention/concentration intact , fund of knowledge appropriate    Cranial Nerves:           II:                         pupils equal , round and reactive to light , visual fields intact         III/ IV/VI:             extraocular movements intact , neg nystagmus , neg ptosis        V:                        facial sensation intact , V1-3 normal        VII:                      face symmetric , no droop , normal eye closure and smile        VIII:                     hearing intact to finger rub bilaterally        IX and X:             no hoarseness , gag intact , palate/ uvula rise symmetrically        XI:                       SCM / trapezius strength intact bilateral        XII:                      no tongue deviation    Motor Exam:        > 3+/5 x 4 extremities , without drift     Sensation:         intact to light touch x 4 extremities                            no neglect or extinction on double simultaneous testing    DTR:           biceps/brachioradialis: equal                            patella/ankle: equal          neg Babinski         Coordination:            Finger to Nose:  neg dysmetria bilaterally      Gait:  not tested         PM&R Impression: admitted for fall at home     - no acute pathology on CT  imaging      - deconditioned    - no focal neurologic deficits       Recommendations / Plan:       1) Physical / Occupational therapy focusing on therapeutic exercises , equipment evaluation , bed mobility/transfer out of bed evaluation , progressive ambulation with assistive devices prn .    2) Current disposition plan recommendation:    pending functional progress

## 2024-08-16 NOTE — DIETITIAN INITIAL EVALUATION ADULT - PROBLEM SELECTOR PLAN 1
, Hx of recurrent mechanical falls; likely secondary to chronic hip pain, pelvic fracture (04/2024), recent admissions: 8/12, 04/2024  Mechanical fall this morning while backing into her bed, denied LOC or hitting her head  CT head, cervical spine and thoracic spine with no acute intracranial hemorrhage, extra axial collection or acute   abnormality.  CT chest with no acute pathology  EKG with 1st degree AV block, pt asymptomatic  pt on home gabapentin (but unsure why she's taking it) - currently held iso falls    - Orthostatics  - PT/OT eval  - IS   - formal med rec AM  - fall precautions

## 2024-08-16 NOTE — PROGRESS NOTE ADULT - PROBLEM SELECTOR PLAN 7
as per HIE, h/o migraine. pt not reporting any migraine symptoms currently  - on home topiramate 25mg daily    - Held home med given no symptoms recently as per HIE, h/o migraine. pt not reporting any migraine symptoms currently  - on home topiramate 25mg daily    Plan:  - Held home med given no symptoms recently

## 2024-08-16 NOTE — DIETITIAN INITIAL EVALUATION ADULT - ORAL INTAKE PTA/DIET HISTORY
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 Pt reproted low appetite and endorsed ~10 lb wt loss x 1 year; mentioned following a "balanced" diet at home. 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 Pt reported low appetite and endorsed ~10 lb wt loss x 1 year; mentioned following a "balanced" diet at home.

## 2024-08-16 NOTE — DISCHARGE NOTE PROVIDER - ATTENDING DISCHARGE PHYSICAL EXAMINATION:
Gen: sitting upright in bed at time of exam  HEENT: NCAT, MMM, clear OP  Neck: supple, trachea at midline  CV: RRR, +S1/S2  Pulm: adequate respiratory effort, no increased work of breathing  Abd: soft, NTND  Skin: warm and dry, no new rashes vs prior report  Ext: WWP  Neuro: AOx3, no gross focal neurological deficits  Psych: affect and behavior appropriate Gen: sitting upright in bed at time of exam  HEENT: NCAT, MMM, clear OP  Neck: supple, trachea at midline  CV: RRR, +S1/S2  Pulm: adequate respiratory effort, no increased work of breathing.  Abd: soft, NTND  Skin: warm and dry, no new rashes vs prior report  Ext: WWP  Neuro: AOx3, no gross focal neurological deficits  Psych: affect and behavior appropriate

## 2024-08-16 NOTE — DISCHARGE NOTE PROVIDER - HOSPITAL COURSE
#Discharge: do not delete    Ms. Lezama is a 84 yo F w/ PMHx of pelvic fracture 5/2024, HTN, NSTEMI s/p PCI 10/20/23 (Shockwave/DORITA x 2 pLAD), Meniere's disease, Vertigo, Depression, who presents to  [facility] ED c/o mechanical fall, admitted for further workup and management    Problem List/Main Diagnoses (system-based):   # Recurrent falls.   Hx of recurrent mechanical falls; likely secondary to chronic hip pain, pelvic fracture (04/2024), recent admissions: 8/12, 04/2024  Mechanical fall this morning while backing into her bed, denied LOC or hitting her head  CT head, cervical spine and thoracic spine with no acute intracranial hemorrhage, extra axial collection or acute   abnormality.  CT chest with no acute pathology  EKG with 1st degree AV block, pt asymptomatic  pt on home gabapentin (but unsure why she's taking it) - currently held iso falls  PT/OT recommending YANIQUE    Plan:  - discharge to [facility]    #Left-sided back pain.   Acute L side back pain s/p mechanical fall  Imaging negative for rib fractures    Plan:  -     #HTN (hypertension).   ·  Plan: On home metoprolol and triamterene/HCTZ    Plan:   - c/w home meds.    #CAD (coronary artery disease).   ·  Plan: hx NSTEMI s/p PCI 10/20/23 (Shockwave/DORITA x 2 pLAD)  - on home Plavix, aspirin, statin, metoprolol    Plan:  - c/w home meds.    Patient was discharged to: [facility]     New medications:   Changes to old medications:  Medications that were stopped:    Items to follow up as outpatient: PCP    Physical exam at the time of discharge:       LABS & STUDIES:   #Discharge: do not delete    Ms. Lezama is a 84 yo F w/ PMHx of pelvic fracture 5/2024, HTN, NSTEMI s/p PCI 10/20/23 (Shockwave/DORITA x 2 pLAD), Meniere's disease, Vertigo, Depression, who presents to  [facility] ED c/o mechanical fall, admitted for further workup and management    Problem List/Main Diagnoses (system-based):   # Recurrent falls.   Hx of recurrent mechanical falls; likely secondary to chronic hip pain, pelvic fracture (04/2024), recent admissions: 8/12, 04/2024  Mechanical fall this morning while backing into her bed, denied LOC or hitting her head  CT head, cervical spine and thoracic spine with no acute intracranial hemorrhage, extra axial collection or acute   abnormality.  CT chest with no acute pathology  EKG with 1st degree AV block, pt asymptomatic  pt on home gabapentin (but unsure why she's taking it) - currently held iso falls  PT/OT recommending YANIQUE    Plan:  - discharge to [facility]    #Left-sided back pain.   Acute L side back pain s/p mechanical fall  Imaging negative for rib fractures    Plan:  -     #HTN (hypertension).   ·  Plan: On home metoprolol and triamterene/HCTZ    Plan:   - c/w home meds.    #CAD (coronary artery disease).   ·  Plan: hx NSTEMI s/p PCI 10/20/23 (Shockwave/DORITA x 2 pLAD)  - on home Plavix, aspirin, statin, metoprolol    Plan:  - c/w home meds.    Patient was discharged to: [facility]     New medications:   Changes to old medications: None  Medications that were stopped: None    Items to follow up as outpatient: PCP    Physical exam at the time of discharge:     General: Lying in bed in mild discomfort  HEENT: NC/AT; EOMI, anicteric sclera; MMM  Neck: supple  Cardiovascular: +S1/S2, RRR  Respiratory: Lungs CTA, no W/R/R, no iWOB  Back: Tenderness to palpation of the L back with 2 superficial abrasions   Gastrointestinal: soft, NT/ND; +BSx4. Mild tenderness to palpation of the L hip  Extremities: WWP; no edema, clubbing or cyanosis  Vascular: 2+ radial pulses B/L  Neurological: AAOx3; no focal deficits  Psychiatric: pleasant mood and affect      LABS & STUDIES:   #Discharge: do not delete    Ms. Lezama is a 84 yo F w/ PMHx of pelvic fracture 5/2024, HTN, NSTEMI s/p PCI 10/20/23 (Shockwave/DORITA x 2 pLAD), Meniere's disease, Vertigo, Depression, who presents to  [facility] ED c/o mechanical fall, admitted for further workup and management    Problem List/Main Diagnoses (system-based):   # Recurrent falls.   Hx of recurrent mechanical falls; likely secondary to chronic hip pain, pelvic fracture (04/2024), recent admissions: 8/12, 04/2024  Mechanical fall this morning while backing into her bed, denied LOC or hitting her head  CT head, cervical spine and thoracic spine with no acute intracranial hemorrhage, extra axial collection or acute   abnormality.  CT chest with no acute pathology  EKG with 1st degree AV block, pt asymptomatic  pt on home gabapentin (but unsure why she's taking it) - currently held iso falls  PT/OT recommending YANIQUE    Plan:  - discharge to [facility]    #Left-sided back pain.   Acute L side back pain s/p mechanical fall  Imaging negative for rib fractures    Plan:  -     #HTN (hypertension).   ·  Plan: On home metoprolol and triamterene/HCTZ    Plan:   - c/w home meds.    #CAD (coronary artery disease).   ·  Plan: hx NSTEMI s/p PCI 10/20/23 (Shockwave/DORITA x 2 pLAD)  - on home Plavix, aspirin, statin, metoprolol    Plan:  - c/w home meds.    Patient was discharged to: [facility]     New medications: None  Changes to old medications: None  Medications that were stopped: None    Items to follow up as outpatient: PCP    Physical exam at the time of discharge:     General: Lying in bed in mild discomfort  HEENT: NC/AT; EOMI, anicteric sclera; MMM  Neck: supple  Cardiovascular: +S1/S2, RRR  Respiratory: Lungs CTA, no W/R/R, no iWOB  Back: Tenderness to palpation of the L back with 2 superficial abrasions   Gastrointestinal: soft, NT/ND; +BSx4. Mild tenderness to palpation of the L hip  Extremities: WWP; no edema, clubbing or cyanosis  Vascular: 2+ radial pulses B/L  Neurological: AAOx3; no focal deficits  Psychiatric: pleasant mood and affect      LABS & STUDIES:

## 2024-08-16 NOTE — PROGRESS NOTE ADULT - PROBLEM SELECTOR PLAN 4
hx NSTEMI s/p PCI 10/20/23 (Shockwave/DORITA x 2 pLAD)  - on home Plavix, aspirin, statin, metoprolol    - Continue home meds hx NSTEMI s/p PCI 10/20/23 (Shockwave/DORITA x 2 pLAD)  - on home Plavix, aspirin, statin, metoprolol    Plan:  - c/w home meds

## 2024-08-16 NOTE — DISCHARGE NOTE PROVIDER - NSDCMRMEDTOKEN_GEN_ALL_CORE_FT
acetaminophen 325 mg oral tablet: 3 tab(s) orally every 12 hours As needed Mild Pain (1 - 3), Moderate Pain (4 - 6)  aspirin 81 mg oral delayed release tablet: 1 tab(s) orally once a day  atorvastatin 40 mg oral tablet: 1 tab(s) orally once a day (at bedtime)  ATORVASTATIN CALCIUM  40 MG TABS:   BACLOFEN  5 MG TABS:   buPROPion 300 mg/24 hours (XL) oral tablet, extended release: 1 tab(s) orally once a day  BUPROPION HCL  MG TABLET: GIVE 1 TABLET BY MOUTH IN THE EVENING FOR DEPRESSION  clopidogrel 75 mg oral tablet: 1 tab(s) orally once a day  CLOPIDOGREL 75 MG TABLET: GIVE 1 TABLET BY MOUTH ONE TIME A DAY FOR CAD S/P STENTS  DICLOFENAC SODIUM 1% GEL: APPLY TO BREASTS TOPICALLY TWO TIMES A DAY FOR PAIN 2GM  ENULOSE 10 GM/15 ML SOLUTION: GIVE 30 ML BY MOUTH IN THE AFTERNOON FOR CONSTIPATION  GABAPENTIN 100 MG CAPSULE: GIVE 1 CAPSULE BY MOUTH THREE TIMES A DAY FOR NEUROPATHIC PAIN  Lovenox 30 mg/0.3 mL injectable solution: 30 milligram(s) subcutaneously once a day  metoprolol succinate 25 mg oral tablet, extended release: 1 tab(s) orally once a day  METOPROLOL SUCCINATE ER  25 MG TB24:   NALOXONE HYDROCHLORIDE  4 MG/0.1ML LIQD:   NITROFURANTOIN MONO- MG: TAKE 1 CAPSULE BY MOUTH TWICE A DAY FOR 5 DAYS  OXYCODONE HYDROCHLORIDE  5 MG TABS:   polyethylene glycol 3350 oral powder for reconstitution: 17 gram(s) orally every 12 hours  POTASSIUM CHLORIDE ER  20 MEQ TBCR:   senna leaf extract oral tablet: 2 tab(s) orally once a day (at bedtime)  sertraline 25 mg oral tablet: 1 tab(s) orally once a day  SERTRALINE  MG TABLET: TAKE 1 AND 1/2 TABLETS BY MOUTH EVERY DAY  topiramate 25 mg oral tablet: 1 tab(s) orally once a day  TOPIRAMATE 25 MG TABLET: GIVE 1 TABLET BY MOUTH ONE TIME A DAY FOR MIGRAINE  triamterene-hydrochlorothiazide 37.5 mg-25 mg oral tablet: 1 tab(s) orally once a day   acetaminophen 325 mg oral tablet: 3 tab(s) orally every 12 hours As needed Mild Pain (1 - 3), Moderate Pain (4 - 6)  aspirin 81 mg oral delayed release tablet: 1 tab(s) orally once a day  atorvastatin 40 mg oral tablet: 1 tab(s) orally once a day (at bedtime)  ATORVASTATIN CALCIUM  40 MG TABS:   BACLOFEN  5 MG TABS:   buPROPion 300 mg/24 hours (XL) oral tablet, extended release: 1 tab(s) orally once a day  BUPROPION HCL  MG TABLET: GIVE 1 TABLET BY MOUTH IN THE EVENING FOR DEPRESSION  clopidogrel 75 mg oral tablet: 1 tab(s) orally once a day  CLOPIDOGREL 75 MG TABLET: GIVE 1 TABLET BY MOUTH ONE TIME A DAY FOR CAD S/P STENTS  DICLOFENAC SODIUM 1% GEL: APPLY TO BREASTS TOPICALLY TWO TIMES A DAY FOR PAIN 2GM  ENULOSE 10 GM/15 ML SOLUTION: GIVE 30 ML BY MOUTH IN THE AFTERNOON FOR CONSTIPATION  GABAPENTIN 100 MG CAPSULE: GIVE 1 CAPSULE BY MOUTH ONCE A DAY FOR NEUROPATHIC PAIN  metoprolol succinate 25 mg oral tablet, extended release: 1 tab(s) orally once a day  METOPROLOL SUCCINATE ER  25 MG TB24:   NALOXONE HYDROCHLORIDE  4 MG/0.1ML LIQD:   NITROFURANTOIN MONO- MG: TAKE 1 CAPSULE BY MOUTH TWICE A DAY FOR 5 DAYS  OXYCODONE HYDROCHLORIDE  5 MG TABS:   polyethylene glycol 3350 oral powder for reconstitution: 17 gram(s) orally every 12 hours  POTASSIUM CHLORIDE ER  20 MEQ TBCR:   senna leaf extract oral tablet: 2 tab(s) orally once a day (at bedtime)  sertraline 25 mg oral tablet: 1 tab(s) orally once a day  SERTRALINE  MG TABLET: TAKE 1 AND 1/2 TABLETS BY MOUTH EVERY DAY  topiramate 25 mg oral tablet: 1 tab(s) orally once a day  TOPIRAMATE 25 MG TABLET: GIVE 1 TABLET BY MOUTH ONE TIME A DAY FOR MIGRAINE  triamterene-hydrochlorothiazide 37.5 mg-25 mg oral tablet: 1 tab(s) orally once a day   acetaminophen 325 mg oral tablet: 3 tab(s) orally every 12 hours As needed Mild Pain (1 - 3), Moderate Pain (4 - 6)  aspirin 81 mg oral delayed release tablet: 1 tab(s) orally once a day  atorvastatin 40 mg oral tablet: 1 tab(s) orally once a day (at bedtime)  BACLOFEN  5 MG TABS:   buPROPion 300 mg/24 hours (XL) oral tablet, extended release: 1 tab(s) orally once a day  clopidogrel 75 mg oral tablet: 1 tab(s) orally once a day  DICLOFENAC SODIUM 1% GEL: APPLY TO BREASTS TOPICALLY TWO TIMES A DAY FOR PAIN 2GM  ENULOSE 10 GM/15 ML SOLUTION: GIVE 30 ML BY MOUTH IN THE AFTERNOON FOR CONSTIPATION  GABAPENTIN 100 MG CAPSULE: GIVE 1 CAPSULE BY MOUTH ONCE A DAY FOR NEUROPATHIC PAIN  metoprolol succinate 25 mg oral tablet, extended release: 1 tab(s) orally once a day  Multiple Vitamins oral tablet: 1 tab(s) orally once a day  NALOXONE HYDROCHLORIDE  4 MG/0.1ML LIQD:   NITROFURANTOIN MONO- MG: TAKE 1 CAPSULE BY MOUTH TWICE A DAY FOR 5 DAYS  OXYCODONE HYDROCHLORIDE  5 MG TABS:   polyethylene glycol 3350 oral powder for reconstitution: 17 gram(s) orally every 12 hours  POTASSIUM CHLORIDE ER  20 MEQ TBCR:   senna leaf extract oral tablet: 2 tab(s) orally once a day (at bedtime)  sertraline 50 mg oral tablet: 3 tab(s) orally once a day  thiamine 100 mg oral tablet: 1 tab(s) orally once a day  topiramate 25 mg oral tablet: 1 tab(s) orally once a day  triamterene-hydrochlorothiazide 37.5 mg-25 mg oral tablet: 1 tab(s) orally once a day   acetaminophen 325 mg oral tablet: 3 tab(s) orally every 12 hours As needed Mild Pain (1 - 3), Moderate Pain (4 - 6)  aspirin 81 mg oral delayed release tablet: 1 tab(s) orally once a day  atorvastatin 40 mg oral tablet: 1 tab(s) orally once a day (at bedtime)  BACLOFEN  5 MG TABS:   buPROPion 300 mg/24 hours (XL) oral tablet, extended release: 1 tab(s) orally once a day  clopidogrel 75 mg oral tablet: 1 tab(s) orally once a day  DICLOFENAC SODIUM 1% GEL: APPLY TO BREASTS TOPICALLY TWO TIMES A DAY FOR PAIN 2GM  ENULOSE 10 GM/15 ML SOLUTION: GIVE 30 ML BY MOUTH IN THE AFTERNOON FOR CONSTIPATION  GABAPENTIN 100 MG CAPSULE: GIVE 1 CAPSULE BY MOUTH ONCE A DAY FOR NEUROPATHIC PAIN  metoprolol succinate 25 mg oral tablet, extended release: 1 tab(s) orally once a day  Multiple Vitamins oral tablet: 1 tab(s) orally once a day  NALOXONE HYDROCHLORIDE  4 MG/0.1ML LIQD:   NITROFURANTOIN MONO- MG: TAKE 1 CAPSULE BY MOUTH TWICE A DAY FOR 5 DAYS  polyethylene glycol 3350 oral powder for reconstitution: 17 gram(s) orally every 12 hours  POTASSIUM CHLORIDE ER  20 MEQ TBCR:   senna leaf extract oral tablet: 2 tab(s) orally once a day (at bedtime)  sertraline 50 mg oral tablet: 3 tab(s) orally once a day  thiamine 100 mg oral tablet: 1 tab(s) orally once a day  topiramate 25 mg oral tablet: 1 tab(s) orally once a day  triamterene-hydrochlorothiazide 37.5 mg-25 mg oral tablet: 1 tab(s) orally once a day

## 2024-08-16 NOTE — PROGRESS NOTE ADULT - PROBLEM SELECTOR PLAN 3
- on home metoprolol and triamterene/HCTZ    - Continue home meds On home metoprolol and triamterene/HCTZ    Plan:   - c/w home meds

## 2024-08-16 NOTE — DIETITIAN INITIAL EVALUATION ADULT - ADD RECOMMEND
1. Consider liberalizing diet to Regular to optimize PO intake  - Encourage adequate PO and protein intake  - Honor food preferences, as medically able  2. Recommend Ensure Max BID  - Provides 150 kcal, 30 g pro per serving  3. Recommend MVI for general nutrient coverage  4. Recommend thiamin 100 mg/d in setting of malnutrition  5. Continue to monitor lytes, specifically Mg and Phos as pt is at high risk for refeeding, replete prn  6. Continue with current bowel regimen, prn  7. Appreciate weekly weight trends  1. Continue with Regular diet  - Encourage adequate PO and protein intake  - Honor food preferences, as medically able  2. Recommend Ensure Max BID  - Provides 150 kcal, 30 g pro per serving  3. Recommend MVI for general nutrient coverage  4. Recommend thiamin 100 mg/d in setting of malnutrition  5. Continue to monitor lytes, specifically Mg and Phos as pt is at high risk for refeeding, replete prn  6. Continue with current bowel regimen, prn  7. Appreciate weekly weight trends

## 2024-08-17 LAB
FOLATE SERPL-MCNC: 12.5 NG/ML — SIGNIFICANT CHANGE UP
VIT B12 SERPL-MCNC: 896 PG/ML — SIGNIFICANT CHANGE UP (ref 232–1245)

## 2024-08-17 PROCEDURE — 99232 SBSQ HOSP IP/OBS MODERATE 35: CPT | Mod: GC

## 2024-08-17 RX ADMIN — Medication 1 PATCH: at 00:40

## 2024-08-17 RX ADMIN — Medication 1 PATCH: at 04:04

## 2024-08-17 RX ADMIN — ACETAMINOPHEN 650 MILLIGRAM(S): 325 TABLET ORAL at 12:57

## 2024-08-17 RX ADMIN — Medication 40 MILLIGRAM(S): at 22:48

## 2024-08-17 RX ADMIN — Medication 1 PATCH: at 22:48

## 2024-08-17 RX ADMIN — OXYCODONE HYDROCHLORIDE 5 MILLIGRAM(S): 5 TABLET ORAL at 12:07

## 2024-08-17 RX ADMIN — ACETAMINOPHEN 650 MILLIGRAM(S): 325 TABLET ORAL at 18:15

## 2024-08-17 RX ADMIN — Medication 81 MILLIGRAM(S): at 11:58

## 2024-08-17 RX ADMIN — POLYETHYLENE GLYCOL 3350 17 GRAM(S): 17 POWDER, FOR SOLUTION ORAL at 18:15

## 2024-08-17 RX ADMIN — ACETAMINOPHEN 650 MILLIGRAM(S): 325 TABLET ORAL at 00:41

## 2024-08-17 RX ADMIN — ACETAMINOPHEN 650 MILLIGRAM(S): 325 TABLET ORAL at 07:10

## 2024-08-17 RX ADMIN — ENOXAPARIN SODIUM 30 MILLIGRAM(S): 100 INJECTION SUBCUTANEOUS at 22:48

## 2024-08-17 RX ADMIN — SERTRALINE HYDROCHLORIDE 150 MILLIGRAM(S): 50 TABLET, FILM COATED ORAL at 11:57

## 2024-08-17 RX ADMIN — Medication 1 PATCH: at 23:56

## 2024-08-17 RX ADMIN — Medication 1 PATCH: at 19:05

## 2024-08-17 RX ADMIN — ACETAMINOPHEN 650 MILLIGRAM(S): 325 TABLET ORAL at 23:50

## 2024-08-17 RX ADMIN — Medication 2 TABLET(S): at 22:48

## 2024-08-17 RX ADMIN — BUPROPION HYDROCHLORIDE 300 MILLIGRAM(S): 150 TABLET ORAL at 11:58

## 2024-08-17 RX ADMIN — Medication 1 TABLET(S): at 11:58

## 2024-08-17 RX ADMIN — POLYETHYLENE GLYCOL 3350 17 GRAM(S): 17 POWDER, FOR SOLUTION ORAL at 07:10

## 2024-08-17 RX ADMIN — METOPROLOL TARTRATE 25 MILLIGRAM(S): 100 TABLET ORAL at 07:10

## 2024-08-17 RX ADMIN — ACETAMINOPHEN 650 MILLIGRAM(S): 325 TABLET ORAL at 11:57

## 2024-08-17 RX ADMIN — Medication 75 MILLIGRAM(S): at 11:58

## 2024-08-17 RX ADMIN — ACETAMINOPHEN 650 MILLIGRAM(S): 325 TABLET ORAL at 19:15

## 2024-08-17 RX ADMIN — Medication 1 PATCH: at 11:57

## 2024-08-17 RX ADMIN — Medication 1 TABLET(S): at 07:10

## 2024-08-17 RX ADMIN — ACETAMINOPHEN 650 MILLIGRAM(S): 325 TABLET ORAL at 08:10

## 2024-08-17 RX ADMIN — Medication 100 MILLIGRAM(S): at 11:58

## 2024-08-17 RX ADMIN — OXYCODONE HYDROCHLORIDE 5 MILLIGRAM(S): 5 TABLET ORAL at 13:07

## 2024-08-17 NOTE — OCCUPATIONAL THERAPY INITIAL EVALUATION ADULT - GENERAL OBSERVATIONS, REHAB EVAL
Pt received semi-supine in bed, +heplock, +primafit in NAD and agreeable to OT. Cleared by AMBER Mccormick to see

## 2024-08-17 NOTE — OCCUPATIONAL THERAPY INITIAL EVALUATION ADULT - PERTINENT HX OF CURRENT PROBLEM, REHAB EVAL
Ms. Lezama is a 82 yo F w/ PMHx of pelvic fracture 4/2024, HTN, NSTEMI s/p PCI 10/20/23 (Shockwave/DORITA x 2 pLAD), Meniere's disease, Vertigo, Depression, presents to  [facility] ED c/o mechanical fall that occurred this morning. As she was trying to get into her bed, she was backing into but fell into the bed frame and hit her L back against the frame. Denies LOC or hitting her head. She waited to get back up but was unable to get up after a few minutes and called ambulance. Since then, she has pain in her L back/ flank  which is exacerbated with any movement or deep inspiration. She has had multiple ED visits over the past year for mechanical falls. As per patient, she had a L pelvic fracture in 04/2024. She was recently admitted in OSH (8/12, 04/2024) for mechanical falls as well.  In the past, she was sent to [facility] after her admissions for her falls. She uses her walker at baseline and has a HHA 12hours daily. She also mentions losing 10 pounds in the past year, especially after her NSTEMI. She has been eating and drinking well, she avoids gluten and processed foods. Denies headache, dizziness, chest pain, SOB, abdominal pain, urinary symptoms. Denies numbness/tingling on extremities.

## 2024-08-17 NOTE — OCCUPATIONAL THERAPY INITIAL EVALUATION ADULT - DIAGNOSIS, OT EVAL
Pt admitted s/p fall presents with impaired balance, generalized weakness, and decreased strength impacting overall ease of completing ADLs and functional mobility tasks at OF.

## 2024-08-17 NOTE — PROGRESS NOTE ADULT - SUBJECTIVE AND OBJECTIVE BOX
Internal Medicine Progress Note  Cayla Valentine, PGY1     OVERNIGHT EVENTS/INTERVAL HPI: No acute events overnight     OBJECTIVE:  Vital Signs Last 24 Hrs  T(C): 36.7 (16 Aug 2024 20:57), Max: 37.4 (16 Aug 2024 16:11)  T(F): 98.1 (16 Aug 2024 20:57), Max: 99.4 (16 Aug 2024 16:11)  HR: 76 (16 Aug 2024 20:57) (60 - 76)  BP: 127/68 (16 Aug 2024 20:57) (105/61 - 148/72)  BP(mean): --  RR: 17 (16 Aug 2024 20:57) (17 - 18)  SpO2: 97% (16 Aug 2024 20:57) (96% - 97%)    Parameters below as of 16 Aug 2024 20:57  Patient On (Oxygen Delivery Method): room air      I&O's Detail    Physical Exam:  General: Lying in bed in mild discomfort  HEENT: NC/AT; EOMI, anicteric sclera; MMM  Neck: supple  Cardiovascular: +S1/S2, RRR  Respiratory: notable splinting, lungs CTA, no W/R/R, no iWOB  Back: Tenderness to palpation of the L back with 2 superficial abrasions   Gastrointestinal: soft, NT/ND; +BSx4  Extremities: WWP; no edema, clubbing or cyanosis  Vascular: 2+ radial pulses B/L  Neurological: AAOx3; no focal deficits  Psychiatric: pleasant mood and affect  Dermatologic: no appreciable wounds or damage to the skin    Medications:  MEDICATIONS  (STANDING):  acetaminophen     Tablet .. 650 milliGRAM(s) Oral every 6 hours  aspirin enteric coated 81 milliGRAM(s) Oral daily  atorvastatin 40 milliGRAM(s) Oral at bedtime  buPROPion XL (24-Hour) . 300 milliGRAM(s) Oral daily  clopidogrel Tablet 75 milliGRAM(s) Oral daily  enoxaparin Injectable 30 milliGRAM(s) SubCutaneous every 24 hours  lidocaine   4% Patch 1 Patch Transdermal daily  metoprolol succinate ER 25 milliGRAM(s) Oral daily  multivitamin 1 Tablet(s) Oral daily  polyethylene glycol 3350 17 Gram(s) Oral every 12 hours  senna 2 Tablet(s) Oral at bedtime  sertraline 150 milliGRAM(s) Oral daily  thiamine 100 milliGRAM(s) Oral daily  triamterene 37.5 mG/hydrochlorothiazide 25 mG Tablet 1 Tablet(s) Oral daily    MEDICATIONS  (PRN):  lidocaine   4% Patch 1 Patch Transdermal daily PRN Pain  oxyCODONE    IR 10 milliGRAM(s) Oral every 8 hours PRN Severe Pain (7 - 10)  oxyCODONE    IR 5 milliGRAM(s) Oral every 6 hours PRN Moderate Pain (4 - 6)      Labs:                        11.3   5.99  )-----------( 313      ( 16 Aug 2024 05:30 )             35.3     08-16    140  |  107  |  26<H>  ----------------------------<  103<H>  3.9   |  24  |  0.69    Ca    9.0      16 Aug 2024 05:30  Phos  3.8     08-16  Mg     1.8     08-16          Urinalysis Basic - ( 16 Aug 2024 05:30 )    Color: x / Appearance: x / SG: x / pH: x  Gluc: 103 mg/dL / Ketone: x  / Bili: x / Urobili: x   Blood: x / Protein: x / Nitrite: x   Leuk Esterase: x / RBC: x / WBC x   Sq Epi: x / Non Sq Epi: x / Bacteria: x          Radiology: Reviewed Internal Medicine Progress Note  Cayla Valentine, PGY1     OVERNIGHT EVENTS/INTERVAL HPI: No acute events overnight. Notes dry lips causing mouth pain and chronic back pain. No acute pain events.    OBJECTIVE:  Vital Signs Last 24 Hrs  T(C): 36.7 (16 Aug 2024 20:57), Max: 37.4 (16 Aug 2024 16:11)  T(F): 98.1 (16 Aug 2024 20:57), Max: 99.4 (16 Aug 2024 16:11)  HR: 76 (16 Aug 2024 20:57) (60 - 76)  BP: 127/68 (16 Aug 2024 20:57) (105/61 - 148/72)  BP(mean): --  RR: 17 (16 Aug 2024 20:57) (17 - 18)  SpO2: 97% (16 Aug 2024 20:57) (96% - 97%)    Parameters below as of 16 Aug 2024 20:57  Patient On (Oxygen Delivery Method): room air      I&O's Detail    Physical Exam:  General: Lying in bed in mild discomfort  HEENT: NC/AT; EOMI, anicteric sclera; MMM  Neck: supple  Cardiovascular: +S1/S2, RRR  Respiratory: notable splinting, lungs CTA, no W/R/R, no iWOB  Back: Tenderness to palpation of the L back with 2 superficial abrasions   Gastrointestinal: soft, NT/ND; +BSx4  Extremities: WWP; no edema, clubbing or cyanosis  Vascular: 2+ radial pulses B/L  Neurological: AAOx3; no focal deficits  Psychiatric: pleasant mood and affect  Dermatologic: no appreciable wounds or damage to the skin    Medications:  MEDICATIONS  (STANDING):  acetaminophen     Tablet .. 650 milliGRAM(s) Oral every 6 hours  aspirin enteric coated 81 milliGRAM(s) Oral daily  atorvastatin 40 milliGRAM(s) Oral at bedtime  buPROPion XL (24-Hour) . 300 milliGRAM(s) Oral daily  clopidogrel Tablet 75 milliGRAM(s) Oral daily  enoxaparin Injectable 30 milliGRAM(s) SubCutaneous every 24 hours  lidocaine   4% Patch 1 Patch Transdermal daily  metoprolol succinate ER 25 milliGRAM(s) Oral daily  multivitamin 1 Tablet(s) Oral daily  polyethylene glycol 3350 17 Gram(s) Oral every 12 hours  senna 2 Tablet(s) Oral at bedtime  sertraline 150 milliGRAM(s) Oral daily  thiamine 100 milliGRAM(s) Oral daily  triamterene 37.5 mG/hydrochlorothiazide 25 mG Tablet 1 Tablet(s) Oral daily    MEDICATIONS  (PRN):  lidocaine   4% Patch 1 Patch Transdermal daily PRN Pain  oxyCODONE    IR 10 milliGRAM(s) Oral every 8 hours PRN Severe Pain (7 - 10)  oxyCODONE    IR 5 milliGRAM(s) Oral every 6 hours PRN Moderate Pain (4 - 6)      Labs:                        11.3   5.99  )-----------( 313      ( 16 Aug 2024 05:30 )             35.3     08-16    140  |  107  |  26<H>  ----------------------------<  103<H>  3.9   |  24  |  0.69    Ca    9.0      16 Aug 2024 05:30  Phos  3.8     08-16  Mg     1.8     08-16          Urinalysis Basic - ( 16 Aug 2024 05:30 )    Color: x / Appearance: x / SG: x / pH: x  Gluc: 103 mg/dL / Ketone: x  / Bili: x / Urobili: x   Blood: x / Protein: x / Nitrite: x   Leuk Esterase: x / RBC: x / WBC x   Sq Epi: x / Non Sq Epi: x / Bacteria: x          Radiology: Reviewed

## 2024-08-17 NOTE — PROGRESS NOTE ADULT - PROBLEM SELECTOR PLAN 1
, Hx of recurrent mechanical falls; likely secondary to chronic hip pain, pelvic fracture (04/2024), recent admissions: 8/12, 04/2024  Mechanical fall this morning while backing into her bed, denied LOC or hitting her head  CT head, cervical spine and thoracic spine with no acute intracranial hemorrhage, extra axial collection or acute   abnormality.  CT chest with no acute pathology  EKG with 1st degree AV block, pt asymptomatic  pt on home gabapentin (but unsure why she's taking it) - currently held iso falls  PT/OT recommending YANIQUE    Plan:  - Fall precautions in place

## 2024-08-17 NOTE — OCCUPATIONAL THERAPY INITIAL EVALUATION ADULT - MODIFIED CLINICAL TEST OF SENSORY INTEGRATION IN BALANCE TEST
Pt tolerated static stand for ~15 seconds with min A for balance support, further activity deferred as pt with c/o dizziness, BP taken in standing with drop in BP noted (see vitals)

## 2024-08-17 NOTE — OCCUPATIONAL THERAPY INITIAL EVALUATION ADULT - ADDITIONAL COMMENTS
Pt lives alone in an elevator accessible apartment states that she has a HHA 7 days a week, ~12 hours a day. HHA assists with bathing, dressing as needed, and cooking/cleaning. Pt wears glasses. Pt has a tub with a shower chair. Pt also owns a cane, rollator and w/c, states that she uses cane and rollator inside her apartment and then w/c for outside in community.

## 2024-08-17 NOTE — PROGRESS NOTE ADULT - PROBLEM SELECTOR PLAN 2
acute L side back pain s/p mechanical fall  Imaging negative for rib fractures    Plan:  - pain management w/ tylenol for mild pain, oxy 5mg for moderate pain, oxy 10 mg for severe pain

## 2024-08-17 NOTE — PROGRESS NOTE ADULT - PROBLEM SELECTOR PLAN 4
hx NSTEMI s/p PCI 10/20/23 (Shockwave/DORITA x 2 pLAD)  - on home Plavix, aspirin, statin, metoprolol    Plan:  - c/w home meds

## 2024-08-17 NOTE — PROGRESS NOTE ADULT - PROBLEM SELECTOR PLAN 7
as per HIE, h/o migraine. pt not reporting any migraine symptoms currently  - on home topiramate 25mg daily    Plan:  - Held home med given no symptoms recently

## 2024-08-17 NOTE — PROGRESS NOTE ADULT - ATTENDING COMMENTS
A/P: 83 y f w/ PMH pelvic fracture 5/2024, HTN, NSTEMI s/p PCI 10/20/23 (Shockwave/DORITA x 2 pLAD), Meniere's disease, Vertigo, Depression, who presents to  [facility] ED c/o mechanical fall.     #Vertigo   Meniere's disease   #Mechanical fall  -Fall precautions   -Pt seen by PT and rec: JENN    #HTN  #Hx of NSTEMI s/p PCI 10/20/23   -Continue Metoprolol, Triameterene and statin   -Continue DAPT     #DISPO  -Pt is awaiting JENN       Time based billing  40 minutes spent on total encounter. The necessity of the time spent during the encounter on this date of service was due to:    Review of hospital course, labs, vitals, radiology and medical records.  Direct patient encounter including bedside exam   Discussed plan of care with resident team and interdisciplinary team.   Documenting the encounter. A/P: 83 y f w/ PMH pelvic fracture 5/2024, HTN, NSTEMI s/p PCI 10/20/23 (Shockwave/DORITA x 2 pLAD), Meniere's disease, Vertigo, Depression, who presents to  [facility] ED c/o mechanical fall.     #Vertigo   Meniere's disease   #Mechanical fall  -Fall precautions   -Per attending documentation on 8/16 the pt was found to have imaging on admission negative for acute fractures; Pt did have mechanical pelvic fractures (POA)  and noted to be subacute on x-ray and previous attending  discussed these findings with ortho and ok for weight bearing, PT   -Pt seen by PT and rec: JENN    #HTN  #Hx of NSTEMI s/p PCI 10/20/23   -Continue Metoprolol, Triameterene and statin   -Continue DAPT     #DISPO  -Pt is awaiting JENN       Time based billing  40 minutes spent on total encounter. The necessity of the time spent during the encounter on this date of service was due to:    Review of hospital course, labs, vitals, radiology and medical records.  Direct patient encounter including bedside exam   Discussed plan of care with resident team and interdisciplinary team.   Documenting the encounter. A/P: 83 y f w/ PMH pelvic fracture 5/2024, HTN, NSTEMI s/p PCI 10/20/23 (Shockwave/DORITA x 2 pLAD), Meniere's disease, Vertigo, Depression, who presents to  [facility] ED c/o mechanical fall.     #Vertigo   Meniere's disease   #Mechanical fall  -Fall precautions   -Per attending documentation on 8/16 the pt was found to have imaging on admission negative for acute fractures; Pt did have mechanical pelvic fractures (POA)  and noted to be subacute on x-ray and previous attending  discussed these findings with ortho who stated that the pt is ok for weight bearing, PT   -Pt seen by PT and rec: JENN    #HTN  #Hx of NSTEMI s/p PCI 10/20/23   -Continue Metoprolol, Triameterene and statin   -Continue DAPT     #DISPO  -Pt is awaiting JENN       Time based billing  40 minutes spent on total encounter. The necessity of the time spent during the encounter on this date of service was due to:    Review of hospital course, labs, vitals, radiology and medical records.  Direct patient encounter including bedside exam   Discussed plan of care with resident team and interdisciplinary team.   Documenting the encounter.

## 2024-08-18 PROCEDURE — 99232 SBSQ HOSP IP/OBS MODERATE 35: CPT

## 2024-08-18 RX ADMIN — ACETAMINOPHEN 650 MILLIGRAM(S): 325 TABLET ORAL at 18:37

## 2024-08-18 RX ADMIN — Medication 1 PATCH: at 07:01

## 2024-08-18 RX ADMIN — Medication 40 MILLIGRAM(S): at 23:05

## 2024-08-18 RX ADMIN — OXYCODONE HYDROCHLORIDE 10 MILLIGRAM(S): 5 TABLET ORAL at 06:34

## 2024-08-18 RX ADMIN — ACETAMINOPHEN 650 MILLIGRAM(S): 325 TABLET ORAL at 12:44

## 2024-08-18 RX ADMIN — Medication 1 TABLET(S): at 06:34

## 2024-08-18 RX ADMIN — OXYCODONE HYDROCHLORIDE 10 MILLIGRAM(S): 5 TABLET ORAL at 07:25

## 2024-08-18 RX ADMIN — Medication 81 MILLIGRAM(S): at 12:44

## 2024-08-18 RX ADMIN — POLYETHYLENE GLYCOL 3350 17 GRAM(S): 17 POWDER, FOR SOLUTION ORAL at 18:37

## 2024-08-18 RX ADMIN — Medication 1 PATCH: at 12:45

## 2024-08-18 RX ADMIN — SERTRALINE HYDROCHLORIDE 150 MILLIGRAM(S): 50 TABLET, FILM COATED ORAL at 12:45

## 2024-08-18 RX ADMIN — ACETAMINOPHEN 650 MILLIGRAM(S): 325 TABLET ORAL at 06:34

## 2024-08-18 RX ADMIN — BUPROPION HYDROCHLORIDE 300 MILLIGRAM(S): 150 TABLET ORAL at 12:44

## 2024-08-18 RX ADMIN — Medication 2 TABLET(S): at 23:06

## 2024-08-18 RX ADMIN — Medication 100 MILLIGRAM(S): at 13:14

## 2024-08-18 RX ADMIN — Medication 75 MILLIGRAM(S): at 12:45

## 2024-08-18 RX ADMIN — Medication 1 TABLET(S): at 12:44

## 2024-08-18 RX ADMIN — Medication 1 PATCH: at 23:59

## 2024-08-18 RX ADMIN — ENOXAPARIN SODIUM 30 MILLIGRAM(S): 100 INJECTION SUBCUTANEOUS at 23:06

## 2024-08-18 RX ADMIN — POLYETHYLENE GLYCOL 3350 17 GRAM(S): 17 POWDER, FOR SOLUTION ORAL at 06:34

## 2024-08-18 RX ADMIN — METOPROLOL TARTRATE 25 MILLIGRAM(S): 100 TABLET ORAL at 06:34

## 2024-08-18 RX ADMIN — ACETAMINOPHEN 650 MILLIGRAM(S): 325 TABLET ORAL at 00:55

## 2024-08-18 RX ADMIN — ACETAMINOPHEN 650 MILLIGRAM(S): 325 TABLET ORAL at 23:05

## 2024-08-18 RX ADMIN — ACETAMINOPHEN 650 MILLIGRAM(S): 325 TABLET ORAL at 07:25

## 2024-08-18 NOTE — PROGRESS NOTE ADULT - SUBJECTIVE AND OBJECTIVE BOX
Patient was seen and examined at bedside. Case discuss with resident. Pt with some mild hip pain this morning.     Vital Signs Last 24 Hrs  T(C): 36.7 (18 Aug 2024 10:48), Max: 37.3 (17 Aug 2024 21:39)  T(F): 98.1 (18 Aug 2024 10:48), Max: 99.1 (17 Aug 2024 21:39)  HR: 60 (18 Aug 2024 10:48) (60 - 65)  BP: 136/76 (18 Aug 2024 10:48) (119/70 - 136/76)  RR: 18 (18 Aug 2024 10:48) (17 - 18)  SpO2: 98% (18 Aug 2024 10:48) (98% - 99%)    PE: NAD laying in bed; appears comfortable   CTA B/l no wheezing or crackles  Nl S1,S2 no mumur   soft NT/ND + BS   Neuro: AAOx3; sensation intact     acetaminophen     Tablet .. 650 milliGRAM(s) Oral every 6 hours  aspirin enteric coated 81 milliGRAM(s) Oral daily  atorvastatin 40 milliGRAM(s) Oral at bedtime  buPROPion XL (24-Hour) . 300 milliGRAM(s) Oral daily  clopidogrel Tablet 75 milliGRAM(s) Oral daily  enoxaparin Injectable 30 milliGRAM(s) SubCutaneous every 24 hours  lidocaine   4% Patch 1 Patch Transdermal daily PRN  lidocaine   4% Patch 1 Patch Transdermal daily  metoprolol succinate ER 25 milliGRAM(s) Oral daily  multivitamin 1 Tablet(s) Oral daily  oxyCODONE    IR 10 milliGRAM(s) Oral every 8 hours PRN  oxyCODONE    IR 5 milliGRAM(s) Oral every 6 hours PRN  polyethylene glycol 3350 17 Gram(s) Oral every 12 hours  senna 2 Tablet(s) Oral at bedtime  sertraline 150 milliGRAM(s) Oral daily  thiamine 100 milliGRAM(s) Oral daily  triamterene 37.5 mG/hydrochlorothiazide 25 mG Tablet 1 Tablet(s) Oral daily

## 2024-08-18 NOTE — PROGRESS NOTE ADULT - SUBJECTIVE AND OBJECTIVE BOX
Physical Medicine and Rehabilitation Progress Note :       Patient is a 83y old  Female who presents with a chief complaint of Fall (17 Aug 2024 06:05)      HPI:  Ms. Lezama is a 82 yo F w/ PMHx of pelvic fracture 4/2024, HTN, NSTEMI s/p PCI 10/20/23 (Shockwave/DORITA x 2 pLAD), Meniere's disease, Vertigo, Depression, presents to  [facility] ED c/o mechanical fall that occurred this morning. As she was trying to get into her bed, she was backing into but fell into the bed frame and hit her L back against the frame. Denies LOC or hitting her head. She waited to get back up but was unable to get up after a few minutes and called ambulance. Since then, she has pain in her L back/ flank  which is exacerbated with any movement or deep inspiration. She has had multiple ED visits over the past year for mechanical falls. As per patient, she had a L pelvic fracture in 04/2024. She was recently admitted in OSH (8/12, 04/2024) for mechanical falls as well.  In the past, she was sent to [facility] after her admissions for her falls. She uses her walker at baseline and has a HHA 12hours daily. She also mentions losing 10 pounds in the past year, especially after her NSTEMI. She has been eating and drinking well, she avoids gluten and processed foods. Denies headache, dizziness, chest pain, SOB, abdominal pain, urinary symptoms. Denies numbness/tingling on extremities.   PCP: Dr. Fisher    ED course:  VS: T 98.1 , HR 85  , /69, RR 18 , SpO2  99 % (RA)  Labs: CBC remarkable for Hgb 11.2, BMP unremarkable  Urine: N/a  EKG: NSR, 1st degree AV block  Imaging: CT Head, Cervical spine, Thoracic spine with no acute intracranial hemorrhage, extra axial collection or acute abnormality. CT chest with no fracture or acute pathology.  Orders: Percocet x1   (15 Aug 2024 18:31)                Vital Signs Last 24 Hrs  T(C): 36.4 (18 Aug 2024 05:46), Max: 37.3 (17 Aug 2024 21:39)  T(F): 97.6 (18 Aug 2024 05:46), Max: 99.1 (17 Aug 2024 21:39)  HR: 61 (18 Aug 2024 05:46) (61 - 65)  BP: 133/70 (18 Aug 2024 05:46) (119/70 - 133/70)  BP(mean): --  RR: 18 (18 Aug 2024 05:46) (17 - 18)  SpO2: 98% (18 Aug 2024 05:46) (98% - 99%)    Parameters below as of 17 Aug 2024 21:39  Patient On (Oxygen Delivery Method): room air        MEDICATIONS  (STANDING):  acetaminophen     Tablet .. 650 milliGRAM(s) Oral every 6 hours  aspirin enteric coated 81 milliGRAM(s) Oral daily  atorvastatin 40 milliGRAM(s) Oral at bedtime  buPROPion XL (24-Hour) . 300 milliGRAM(s) Oral daily  clopidogrel Tablet 75 milliGRAM(s) Oral daily  enoxaparin Injectable 30 milliGRAM(s) SubCutaneous every 24 hours  lidocaine   4% Patch 1 Patch Transdermal daily  metoprolol succinate ER 25 milliGRAM(s) Oral daily  multivitamin 1 Tablet(s) Oral daily  polyethylene glycol 3350 17 Gram(s) Oral every 12 hours  senna 2 Tablet(s) Oral at bedtime  sertraline 150 milliGRAM(s) Oral daily  thiamine 100 milliGRAM(s) Oral daily  triamterene 37.5 mG/hydrochlorothiazide 25 mG Tablet 1 Tablet(s) Oral daily    MEDICATIONS  (PRN):  lidocaine   4% Patch 1 Patch Transdermal daily PRN Pain  oxyCODONE    IR 5 milliGRAM(s) Oral every 6 hours PRN Moderate Pain (4 - 6)  oxyCODONE    IR 10 milliGRAM(s) Oral every 8 hours PRN Severe Pain (7 - 10)      T(C): 36.4 (08-18-24 @ 05:46), Max: 37.3 (08-17-24 @ 21:39)  HR: 61 (08-18-24 @ 05:46) (61 - 65)  BP: 133/70 (08-18-24 @ 05:46) (119/70 - 133/70)  RR: 18 (08-18-24 @ 05:46) (17 - 18)  SpO2: 98% (08-18-24 @ 05:46) (98% - 99%)        Physical Exam:    83 y o woman lying comfortably in semi Vital's position , awake , alert , no acute complaints     Head: normocephalic , atraumatic    Eyes: PERRLA , EOMI , no nystagmus , sclera anicteric    ENT / FACE: neg nasal discharge , uvula midline , no oropharyngeal erythema / exudate    Neck: supple , negative JVD , negative carotid bruits , no thyromegaly    Chest: CTA bilaterally , neg wheeze / rhonchi / rales / crackles / egophany    Cardiovascular: regular rate and rhythm , neg murmurs / rubs / gallops    Abdomen: soft , non distended , no tenderness to palpation in all 4 quadrants ,  normal bowel sounds     Extremities: WWP , neg cyanosis /clubbing / edema     Neurologic Exam:     Alert and oriented to person , place , date/year , speech fluent w/o dysarthria , follows commands , recent and remote memory intact , repetition intact , comprehension intact ,  attention/concentration intact , fund of knowledge appropriate    Cranial Nerves:           II:                         pupils equal , round and reactive to light , visual fields intact         III/ IV/VI:             extraocular movements intact , neg nystagmus , neg ptosis        V:                        facial sensation intact , V1-3 normal        VII:                      face symmetric , no droop , normal eye closure and smile        VIII:                     hearing intact to finger rub bilaterally        IX and X:             no hoarseness , gag intact , palate/ uvula rise symmetrically        XI:                       SCM / trapezius strength intact bilateral        XII:                      no tongue deviation    Motor Exam:        > 3+/5 x 4 extremities , without drift     Sensation:         intact to light touch x 4 extremities                            no neglect or extinction on double simultaneous testing    DTR:           biceps/brachioradialis: equal                            patella/ankle: equal          neg Babinski       Coordination:            Finger to Nose:  neg dysmetria bilaterally      Initial Functional Status Assessment :       Previous Level of Function:     · Additional Comments	Pt lives alone in an elevator accessible apartment states that she has a HHA 7 days a week, ~12 hours a day. HHA assists with bathing, dressing as needed, and cooking/cleaning. Pt wears glasses. Pt has a tub with a shower chair. Pt also owns a cane, rollator and w/c, states that she uses cane and rollator inside her apartment and then w/c for outside in community.    · Ambulatory Devices Needed	yes  w/c, cane, rollator  · Adaptive Equipment Needed	yes  shower chair    Cognitive Status Examination:   · Level of Consciousness	alert  · Orientation	O-log 24/30; required cues for month, date, and day of the week; person; place; situation  · Follow Commands/Answers Questions	100% of the time; able to follow single-step instructions  · Personal Safety and Judgment	intact  · Short Term Memory	intact  · Long Term Memory	intact    Range of Motion Exam:   · Range of Motion Examination, Upper Extremity	bilateral UE Active ROM was WNL (within normal limits)  · Range of Motion Examination, Lower Extremity	bilateral LE Active ROM was WNL (within normal limits)    Manual Muscle Testing:   · Manual Muscle Testing Results	BUE ~5/5 at all major pivot points.    Extremity Manual Muscle Testing:     · Right Lower Extremity	RLE ~5/5 at all major pivot points  · Left Lower Extremity	L hip flexion ~3+/5, L knee flexion ~4/5, L knee extension 3+/5, L ankle DF/PF 5/5    Bed Mobility: Rolling/Turning:     · Level of [place]	minimum assist (75% patients effort)  · Physical Assist/Nonphysical Assist	1 person assist; nonverbal cues (demo/gestures)    Bed Mobility: Scooting/Bridging:     · Level of [place]	minimum assist (75% patients effort)  · Physical Assist/Nonphysical Assist	1 person assist; nonverbal cues (demo/gestures)    Bed Mobility: Sit to Supine:     · Level of [place]	minimum assist (75% patients effort)  · Physical Assist/Nonphysical Assist	1 person assist; nonverbal cues (demo/gestures)    Bed Mobility: Supine to Sit:     · Level of [place]	minimum assist (75% patients effort)  · Physical Assist/Nonphysical Assist	1 person assist; nonverbal cues (demo/gestures)    Bed Mobility Analysis:     · Impairments Contributing to Impaired Bed Mobility	impaired balance; decreased strength; impaired postural control; pain    Transfer: Sit to Stand:     · Level of [place]	moderate assist (50% patients effort)  · Physical Assist/Nonphysical Assist	1 person assist; nonverbal cues (demo/gestures); verbal cues  · Assistive Device	rollator    Transfer: Stand to Sit:     · Level of [place]	minimum assist (75% patients effort)  · Physical Assist/Nonphysical Assist	1 person assist; nonverbal cues (demo/gestures)  · Assistive Device	rollator    Sit/Stand Transfer Safety Analysis:     · Impairments Contributing to Impaired Transfers	impaired balance; decreased strength; pain; impaired postural control    Balance Skills Assessment:     · Sitting Balance: Static	good balance  · Sitting Balance: Dynamic	fair plus  · Sit-to-Stand Balance	poor balance  · Standing Balance: Static	poor balance  · Standing Balance: Dynamic	poor balance    Sensory Examination:   · Balance Skills	Pt tolerated static stand for ~15 seconds with min A for balance support, further activity deferred as pt with c/o dizziness, BP taken in standing with drop in BP noted (see vitals)      Upper Body Dressing Training:     · Level of [place]	minimum assist (75% patients effort); don gown while seated at EOB  · Physical Assist/Nonphysical Assist	1 person assist; nonverbal cues (demo/gestures)    Eating/Self-Feeding Training:     · Level of [place]	moderate assist (50% patients effort); don B socks while seated at EOB  · Physical Assist/Nonphysical Assist	1 person assist; nonverbal cues (demo/gestures)        PM&R Impression : as above    Current disposition plan recommendation :    subacute rehab placement

## 2024-08-19 ENCOUNTER — TRANSCRIPTION ENCOUNTER (OUTPATIENT)
Age: 84
End: 2024-08-19

## 2024-08-19 VITALS
DIASTOLIC BLOOD PRESSURE: 79 MMHG | TEMPERATURE: 98 F | RESPIRATION RATE: 19 BRPM | HEART RATE: 82 BPM | SYSTOLIC BLOOD PRESSURE: 148 MMHG | OXYGEN SATURATION: 96 %

## 2024-08-19 LAB — GLUCOSE BLDC GLUCOMTR-MCNC: 95 MG/DL — SIGNIFICANT CHANGE UP (ref 70–99)

## 2024-08-19 PROCEDURE — 99239 HOSP IP/OBS DSCHRG MGMT >30: CPT

## 2024-08-19 RX ORDER — OXYCODONE HYDROCHLORIDE 5 MG/1
1 TABLET ORAL
Qty: 45 | Refills: 0
Start: 2024-08-19 | End: 2024-09-02

## 2024-08-19 RX ORDER — SERTRALINE HYDROCHLORIDE 50 MG/1
3 TABLET, FILM COATED ORAL
Qty: 0 | Refills: 0 | DISCHARGE
Start: 2024-08-19

## 2024-08-19 RX ORDER — BUPROPION HYDROCHLORIDE 150 MG/1
1 TABLET ORAL
Qty: 0 | Refills: 0 | DISCHARGE
Start: 2024-08-19

## 2024-08-19 RX ORDER — METOPROLOL TARTRATE 100 MG/1
0 TABLET ORAL
Qty: 0 | Refills: 0 | DISCHARGE

## 2024-08-19 RX ORDER — OXYCODONE HYDROCHLORIDE 5 MG/1
0 TABLET ORAL
Qty: 0 | Refills: 0 | DISCHARGE

## 2024-08-19 RX ORDER — SERTRALINE HYDROCHLORIDE 50 MG/1
0 TABLET, FILM COATED ORAL
Qty: 0 | Refills: 0 | DISCHARGE

## 2024-08-19 RX ORDER — TOPIRAMATE 50 MG/1
0 CAPSULE, EXTENDED RELEASE ORAL
Qty: 0 | Refills: 0 | DISCHARGE

## 2024-08-19 RX ORDER — BUPROPION HYDROCHLORIDE 150 MG/1
0 TABLET ORAL
Qty: 0 | Refills: 0 | DISCHARGE

## 2024-08-19 RX ORDER — THIAMINE HCL 250 MG
1 TABLET ORAL
Qty: 0 | Refills: 0 | DISCHARGE
Start: 2024-08-19

## 2024-08-19 RX ORDER — METOPROLOL TARTRATE 100 MG/1
1 TABLET ORAL
Qty: 0 | Refills: 0 | DISCHARGE
Start: 2024-08-19

## 2024-08-19 RX ADMIN — Medication 100 MILLIGRAM(S): at 11:24

## 2024-08-19 RX ADMIN — OXYCODONE HYDROCHLORIDE 5 MILLIGRAM(S): 5 TABLET ORAL at 15:10

## 2024-08-19 RX ADMIN — Medication 1 PATCH: at 11:25

## 2024-08-19 RX ADMIN — Medication 75 MILLIGRAM(S): at 11:23

## 2024-08-19 RX ADMIN — ACETAMINOPHEN 650 MILLIGRAM(S): 325 TABLET ORAL at 12:24

## 2024-08-19 RX ADMIN — BUPROPION HYDROCHLORIDE 300 MILLIGRAM(S): 150 TABLET ORAL at 11:23

## 2024-08-19 RX ADMIN — METOPROLOL TARTRATE 25 MILLIGRAM(S): 100 TABLET ORAL at 05:41

## 2024-08-19 RX ADMIN — Medication 1 TABLET(S): at 11:24

## 2024-08-19 RX ADMIN — ACETAMINOPHEN 650 MILLIGRAM(S): 325 TABLET ORAL at 05:41

## 2024-08-19 RX ADMIN — Medication 81 MILLIGRAM(S): at 11:24

## 2024-08-19 RX ADMIN — ACETAMINOPHEN 650 MILLIGRAM(S): 325 TABLET ORAL at 06:41

## 2024-08-19 RX ADMIN — ACETAMINOPHEN 650 MILLIGRAM(S): 325 TABLET ORAL at 11:24

## 2024-08-19 RX ADMIN — Medication 1 TABLET(S): at 05:40

## 2024-08-19 RX ADMIN — ACETAMINOPHEN 650 MILLIGRAM(S): 325 TABLET ORAL at 00:05

## 2024-08-19 RX ADMIN — OXYCODONE HYDROCHLORIDE 5 MILLIGRAM(S): 5 TABLET ORAL at 16:10

## 2024-08-19 RX ADMIN — POLYETHYLENE GLYCOL 3350 17 GRAM(S): 17 POWDER, FOR SOLUTION ORAL at 05:41

## 2024-08-19 RX ADMIN — SERTRALINE HYDROCHLORIDE 150 MILLIGRAM(S): 50 TABLET, FILM COATED ORAL at 11:23

## 2024-08-19 NOTE — DISCHARGE NOTE NURSING/CASE MANAGEMENT/SOCIAL WORK - NSDCVIVACCINE_GEN_ALL_CORE_FT
Tdap; 28-Nov-2023 04:41; Luis Fernando Miller (RN); [employer]; [id number] (Exp. Date: 23-Nov-2025); IntraMuscular; Deltoid Left.; 0.5 milliLiter(s); VIS (VIS Published: 09-May-2013, VIS Presented: 28-Nov-2023);

## 2024-08-19 NOTE — PROGRESS NOTE ADULT - ASSESSMENT
I M    83 y o F w/ PMHx of pelvic fracture 5/2024, HTN, NSTEMI s/p PCI 10/20/23 (Shockwave/DORITA x 2 pLAD), Meniere's disease, Vertigo, Depression, who presents to  [facility] ED c/o mechanical fall, admitted for further workup and management    Nutritional Assessment:  · Nutritional Assessment  This patient has been assessed with a concern for Malnutrition and has been determined to have a diagnosis/diagnoses of Severe protein-calorie malnutrition and Underweight (BMI < 19).    This patient is being managed with:   Diet Regular-  Supplement Feeding Modality:  Oral  Ensure Max Cans or Servings Per Day:  1       Frequency:  Two Times a day  Entered: Aug 16 2024 12:05PM    Problem/Plan - 1:  ·  Problem: Recurrent falls.   ·  Plan: , Hx of recurrent mechanical falls; likely secondary to chronic hip pain, pelvic fracture (04/2024), recent admissions: 8/12, 04/2024  Mechanical fall this morning while backing into her bed, denied LOC or hitting her head  CT head, cervical spine and thoracic spine with no acute intracranial hemorrhage, extra axial collection or acute   abnormality.  CT chest with no acute pathology  EKG with 1st degree AV block, pt asymptomatic  pt on home gabapentin (but unsure why she's taking it) - currently held iso falls  PT/OT recommending YANIQUE    Plan:  - Fall precautions in place.    Problem/Plan - 2:  ·  Problem: Left-sided back pain.   ·  Plan: acute L side back pain s/p mechanical fall  Imaging negative for rib fractures    Plan:  - pain management w/ tylenol for mild pain, oxy 5mg for moderate pain, oxy 10 mg for severe pain.    Problem/Plan - 3:  ·  Problem: HTN (hypertension).   ·  Plan: On home metoprolol and triamterene/HCTZ    Plan:   - c/w home meds.    Problem/Plan - 4:  ·  Problem: CAD (coronary artery disease).   ·  Plan: hx NSTEMI s/p PCI 10/20/23 (Shockwave/DORITA x 2 pLAD)  - on home Plavix, aspirin, statin, metoprolol    Plan:  - c/w home meds.    Problem/Plan - 5:  ·  Problem: Major depression.   ·  Plan: - on home sertraline and bupropion  - Qtc 417    Plan:   - c/w sertraline 150mg  - c/w bupropion.    Problem/Plan - 6:  ·  Problem: H/O Meniere's disease.   ·  Plan: denies dizziness, hearing loss, well controlled    Plan:  - CTM.    Problem/Plan - 7:  ·  Problem: H/O migraine.   ·  Plan: as per HIE, h/o migraine. pt not reporting any migraine symptoms currently  - on home topiramate 25mg daily    Plan:  - Held home med given no symptoms recently.    Problem/Plan - 8:  ·  Problem: Prophylactic measure.   ·  Plan: F: as needed  E: replete K<4, Mg <2  N: DASH diet  DVT ppx: Lovenox  Dispo: [facility].    
A/P: 83 y f w/ PMH pelvic fracture 5/2024, HTN, NSTEMI s/p PCI 10/20/23 (Shockwave/DORITA x 2 pLAD), Meniere's disease, Vertigo, Depression p/w mechanical fall.     #Vertigo   Meniere's disease   #Mechanical fall  -Fall precautions   -Per attending documentation on 8/16 the pt was found to have imaging on admission negative for acute fractures; Pt did have mechanical pelvic fractures (POA)  and noted to be subacute on x-ray and previous attending discussed these findings with ortho who stated that the pt is ok for weight bearing, PT  -Continue pain medication and bowel regimen     #HTN  #Hx of NSTEMI s/p PCI 10/20/23   -Continue Metoprolol, Triameterene and statin   -Continue DAPT     #Depression   -Will continue bupropion XL  300mg daily and sertraline 150 mg daily    #DISPO  -Pt is awaiting JENN       Time based billing  40 minutes spent on total encounter. The necessity of the time spent during the encounter on this date of service was due to:    Review of hospital course, labs, vitals, radiology and medical records.  Direct patient encounter including bedside exam   Discussed plan of care with resident team and interdisciplinary team.   Documenting the encounter. 
Ms. Lezama is a 84 yo F w/ PMHx of pelvic fracture 5/2024, HTN, NSTEMI s/p PCI 10/20/23 (Shockwave/DORITA x 2 pLAD), Meniere's disease, Vertigo, Depression, who presents to  [facility] ED c/o mechanical fall, admitted for further workup and management
Ms. Lezama is a 84 yo F w/ PMHx of pelvic fracture 5/2024, HTN, NSTEMI s/p PCI 10/20/23 (Shockwave/DORITA x 2 pLAD), Meniere's disease, Vertigo, Depression, who presents to  [facility] ED c/o mechanical fall, admitted for further workup and management
Ms. Lezama is a 84 yo F w/ PMHx of pelvic fracture 5/2024, HTN, NSTEMI s/p PCI 10/20/23 (Shockwave/DORITA x 2 pLAD), Meniere's disease, Vertigo, Depression, who presents to  [facility] ED c/o mechanical fall, admitted for further workup and management

## 2024-08-19 NOTE — PROGRESS NOTE ADULT - SUBJECTIVE AND OBJECTIVE BOX
OVERNIGHT EVENTS: ANTWAN    SUBJECTIVE / INTERVAL HPI: Patient seen and examined at bedside. Pt still admits to mild L back and L hip pain but reports it is well managed today. She denies experiencing any SOB, CP, cough, headaches, dizziness, N/V, fever, chills, abdominal pain, or LE swelling. She is in agreement with the plan to go to subacute rehab and reports no new concerns at this time.    PHYSICAL EXAM:    General: Lying comfortably and in no acute distress  HEENT: NC/AT; EOMI, anicteric sclera; MMM  Neck: supple  Cardiovascular: +S1/S2, RRR  Respiratory: CTA B/L; no W/R/R  Gastrointestinal: soft, NT/ND; +BSx4  Extremities: WWP; no edema, clubbing or cyanosis  Vascular: 2+ radial pulses B/L  Neurological: AAOx3; no focal deficits  Psychiatric: pleasant mood and affect  Dermatologic: no appreciable wounds or damage to the skin    VITAL SIGNS:  Vital Signs Last 24 Hrs  T(C): 36.5 (19 Aug 2024 04:54), Max: 36.8 (18 Aug 2024 16:30)  T(F): 97.7 (19 Aug 2024 04:54), Max: 98.2 (18 Aug 2024 16:30)  HR: 82 (19 Aug 2024 04:54) (60 - 82)  BP: 148/79 (19 Aug 2024 04:54) (136/76 - 148/79)  BP(mean): --  RR: 19 (19 Aug 2024 04:54) (18 - 19)  SpO2: 96% (19 Aug 2024 04:54) (96% - 98%)    Parameters below as of 19 Aug 2024 04:54  Patient On (Oxygen Delivery Method): room air          MEDICATIONS:  MEDICATIONS  (STANDING):  acetaminophen     Tablet .. 650 milliGRAM(s) Oral every 6 hours  aspirin enteric coated 81 milliGRAM(s) Oral daily  atorvastatin 40 milliGRAM(s) Oral at bedtime  buPROPion XL (24-Hour) . 300 milliGRAM(s) Oral daily  clopidogrel Tablet 75 milliGRAM(s) Oral daily  enoxaparin Injectable 30 milliGRAM(s) SubCutaneous every 24 hours  lidocaine   4% Patch 1 Patch Transdermal daily  metoprolol succinate ER 25 milliGRAM(s) Oral daily  multivitamin 1 Tablet(s) Oral daily  polyethylene glycol 3350 17 Gram(s) Oral every 12 hours  senna 2 Tablet(s) Oral at bedtime  sertraline 150 milliGRAM(s) Oral daily  thiamine 100 milliGRAM(s) Oral daily  triamterene 37.5 mG/hydrochlorothiazide 25 mG Tablet 1 Tablet(s) Oral daily    MEDICATIONS  (PRN):  lidocaine   4% Patch 1 Patch Transdermal daily PRN Pain  oxyCODONE    IR 10 milliGRAM(s) Oral every 8 hours PRN Severe Pain (7 - 10)  oxyCODONE    IR 5 milliGRAM(s) Oral every 6 hours PRN Moderate Pain (4 - 6)      ALLERGIES:  Allergies    penicillins (Unknown)  cabbage (Unknown)  codeine (Unknown)    Intolerances        LABS:              CAPILLARY BLOOD GLUCOSE          RADIOLOGY & ADDITIONAL TESTS: Reviewed. OVERNIGHT EVENTS: ANTWAN    SUBJECTIVE / INTERVAL HPI: Patient seen and examined at bedside. Pt still admits to continued L back and L hip pain and reports she has been taking her pain medications when given by her nurse. She denies experiencing any SOB, CP, cough, headaches, dizziness, N/V, fever, chills, abdominal pain, or LE swelling. She is in agreement with the plan to go to subacute rehab and reports no new concerns at this time.    PHYSICAL EXAM:    General: Lying in bed in mild discomfort  HEENT: NC/AT; EOMI, anicteric sclera; MMM  Neck: supple  Cardiovascular: +S1/S2, RRR  Respiratory: Lungs CTA, no W/R/R, no iWOB  Back: Tenderness to palpation of the L back with 2 superficial abrasions   Gastrointestinal: soft, NT/ND; +BSx4  Extremities: WWP; no edema, clubbing or cyanosis  Vascular: 2+ radial pulses B/L  Neurological: AAOx3; no focal deficits  Psychiatric: pleasant mood and affect    VITAL SIGNS:  Vital Signs Last 24 Hrs  T(C): 36.5 (19 Aug 2024 04:54), Max: 36.8 (18 Aug 2024 16:30)  T(F): 97.7 (19 Aug 2024 04:54), Max: 98.2 (18 Aug 2024 16:30)  HR: 82 (19 Aug 2024 04:54) (60 - 82)  BP: 148/79 (19 Aug 2024 04:54) (136/76 - 148/79)  BP(mean): --  RR: 19 (19 Aug 2024 04:54) (18 - 19)  SpO2: 96% (19 Aug 2024 04:54) (96% - 98%)    Parameters below as of 19 Aug 2024 04:54  Patient On (Oxygen Delivery Method): room air          MEDICATIONS:  MEDICATIONS  (STANDING):  acetaminophen     Tablet .. 650 milliGRAM(s) Oral every 6 hours  aspirin enteric coated 81 milliGRAM(s) Oral daily  atorvastatin 40 milliGRAM(s) Oral at bedtime  buPROPion XL (24-Hour) . 300 milliGRAM(s) Oral daily  clopidogrel Tablet 75 milliGRAM(s) Oral daily  enoxaparin Injectable 30 milliGRAM(s) SubCutaneous every 24 hours  lidocaine   4% Patch 1 Patch Transdermal daily  metoprolol succinate ER 25 milliGRAM(s) Oral daily  multivitamin 1 Tablet(s) Oral daily  polyethylene glycol 3350 17 Gram(s) Oral every 12 hours  senna 2 Tablet(s) Oral at bedtime  sertraline 150 milliGRAM(s) Oral daily  thiamine 100 milliGRAM(s) Oral daily  triamterene 37.5 mG/hydrochlorothiazide 25 mG Tablet 1 Tablet(s) Oral daily    MEDICATIONS  (PRN):  lidocaine   4% Patch 1 Patch Transdermal daily PRN Pain  oxyCODONE    IR 10 milliGRAM(s) Oral every 8 hours PRN Severe Pain (7 - 10)  oxyCODONE    IR 5 milliGRAM(s) Oral every 6 hours PRN Moderate Pain (4 - 6)      ALLERGIES:  Allergies    penicillins (Unknown)  cabbage (Unknown)  codeine (Unknown)    Intolerances        LABS:              CAPILLARY BLOOD GLUCOSE          RADIOLOGY & ADDITIONAL TESTS: Reviewed. OVERNIGHT EVENTS: ANTWAN    SUBJECTIVE / INTERVAL HPI: Patient seen and examined at bedside. Pt still admits to continued L back and L hip pain and reports she has been taking her pain medications when given by her nurse. She denies experiencing any SOB, CP, cough, headaches, dizziness, N/V, fever, chills, abdominal pain, or LE swelling. She is in agreement with the plan to go to subacute rehab and reports no new concerns at this time.    PHYSICAL EXAM:    General: Lying in bed in mild discomfort  HEENT: NC/AT; EOMI, anicteric sclera; MMM  Neck: supple  Cardiovascular: +S1/S2, RRR  Respiratory: Lungs CTA, no W/R/R, no iWOB  Back: Tenderness to palpation of the L back with 2 superficial abrasions   Gastrointestinal: soft, NT/ND; +BSx4. Mild tenderness to palpation of the L hip  Extremities: WWP; no edema, clubbing or cyanosis  Vascular: 2+ radial pulses B/L  Neurological: AAOx3; no focal deficits  Psychiatric: pleasant mood and affect    VITAL SIGNS:  Vital Signs Last 24 Hrs  T(C): 36.5 (19 Aug 2024 04:54), Max: 36.8 (18 Aug 2024 16:30)  T(F): 97.7 (19 Aug 2024 04:54), Max: 98.2 (18 Aug 2024 16:30)  HR: 82 (19 Aug 2024 04:54) (60 - 82)  BP: 148/79 (19 Aug 2024 04:54) (136/76 - 148/79)  BP(mean): --  RR: 19 (19 Aug 2024 04:54) (18 - 19)  SpO2: 96% (19 Aug 2024 04:54) (96% - 98%)    Parameters below as of 19 Aug 2024 04:54  Patient On (Oxygen Delivery Method): room air          MEDICATIONS:  MEDICATIONS  (STANDING):  acetaminophen     Tablet .. 650 milliGRAM(s) Oral every 6 hours  aspirin enteric coated 81 milliGRAM(s) Oral daily  atorvastatin 40 milliGRAM(s) Oral at bedtime  buPROPion XL (24-Hour) . 300 milliGRAM(s) Oral daily  clopidogrel Tablet 75 milliGRAM(s) Oral daily  enoxaparin Injectable 30 milliGRAM(s) SubCutaneous every 24 hours  lidocaine   4% Patch 1 Patch Transdermal daily  metoprolol succinate ER 25 milliGRAM(s) Oral daily  multivitamin 1 Tablet(s) Oral daily  polyethylene glycol 3350 17 Gram(s) Oral every 12 hours  senna 2 Tablet(s) Oral at bedtime  sertraline 150 milliGRAM(s) Oral daily  thiamine 100 milliGRAM(s) Oral daily  triamterene 37.5 mG/hydrochlorothiazide 25 mG Tablet 1 Tablet(s) Oral daily    MEDICATIONS  (PRN):  lidocaine   4% Patch 1 Patch Transdermal daily PRN Pain  oxyCODONE    IR 10 milliGRAM(s) Oral every 8 hours PRN Severe Pain (7 - 10)  oxyCODONE    IR 5 milliGRAM(s) Oral every 6 hours PRN Moderate Pain (4 - 6)      ALLERGIES:  Allergies    penicillins (Unknown)  cabbage (Unknown)  codeine (Unknown)    Intolerances        LABS:              CAPILLARY BLOOD GLUCOSE          RADIOLOGY & ADDITIONAL TESTS: Reviewed.

## 2024-08-19 NOTE — DISCHARGE NOTE NURSING/CASE MANAGEMENT/SOCIAL WORK - PATIENT PORTAL LINK FT
You can access the FollowMyHealth Patient Portal offered by [facility] by registering at the following website: [email or web address]. By joining [facility]’s FollowMyHealth portal, you will also be able to view your health information using other applications (apps) compatible with our system.

## 2024-08-19 NOTE — PROGRESS NOTE ADULT - NUTRITIONAL ASSESSMENT
This patient has been assessed with a concern for Malnutrition and has been determined to have a diagnosis/diagnoses of Severe protein-calorie malnutrition and Underweight (BMI < 19).    This patient is being managed with:   Diet Regular-  Supplement Feeding Modality:  Oral  Ensure Max Cans or Servings Per Day:  1       Frequency:  Two Times a day  Entered: Aug 16 2024 12:05PM  

## 2024-08-19 NOTE — PROGRESS NOTE ADULT - PROBLEM SELECTOR PLAN 1
, Hx of recurrent mechanical falls; likely secondary to chronic hip pain, pelvic fracture (04/2024), recent admissions: 8/12, 04/2024  Mechanical fall this morning while backing into her bed, denied LOC or hitting her head  CT head, cervical spine and thoracic spine with no acute intracranial hemorrhage, extra axial collection or acute   abnormality.  CT chest with no acute pathology  EKG with 1st degree AV block, pt asymptomatic  pt on home gabapentin (but unsure why she's taking it) - currently held iso falls  PT/OT recommending YANIQUE    Plan:  - Fall precautions in place , Hx of recurrent mechanical falls; likely secondary to chronic hip pain, pelvic fracture (04/2024), recent admissions: 8/12, 04/2024  Mechanical fall this morning while backing into her bed, denied LOC or hitting her head  CT head, cervical spine and thoracic spine with no acute intracranial hemorrhage, extra axial collection or acute   abnormality.  CT chest with no acute pathology  EKG with 1st degree AV block, pt asymptomatic  pt on home gabapentin (but unsure why she's taking it) - currently held iso falls  PT/OT recommending [facility]ENN    Plan:  - Fall precautions in place  - Pending placement to [facility] Hx of recurrent mechanical falls; likely secondary to chronic hip pain, pelvic fracture (04/2024), recent admissions: 8/12, 04/2024  Mechanical fall this morning while backing into her bed, denied LOC or hitting her head  CT head, cervical spine and thoracic spine with no acute intracranial hemorrhage, extra axial collection or acute   abnormality.  CT chest with no acute pathology  EKG with 1st degree AV block, pt asymptomatic  pt on home gabapentin (but unsure why she's taking it) - currently held iso falls  PT/OT recommending JENN    Plan:  - Fall precautions in place  - Pending placement to [facility]

## 2024-08-19 NOTE — PROGRESS NOTE ADULT - PROBLEM SELECTOR PLAN 2
Wound Care Provider Visit     Patient seen in [facility] by: MECHELLE Pérez NP RN assisted by: Alethea CHAMPION    Wound care orders and/or updates: silver alginate/gauze/gauze roll change [id number].     Lab(s)/additional orders placed this visit: None  Wound(s) debrided by provider: No    Wound care being completed by: Home Care  Supplies provided/ordered: Provided 1 week worth of supplies for home care services    Patient education complete: Yes  Patient verbalized understanding of education/patient questions answered: Yes    Follow up in 3 weeks.      acute L side back pain s/p mechanical fall  Imaging negative for rib fractures    Plan:  - pain management w/ tylenol for mild pain, oxy 5mg for moderate pain, oxy 10 mg for severe pain

## 2024-09-29 PROCEDURE — 71250 CT THORAX DX C-: CPT | Mod: MC

## 2024-09-29 PROCEDURE — 82746 ASSAY OF FOLIC ACID SERUM: CPT

## 2024-09-29 PROCEDURE — 83735 ASSAY OF MAGNESIUM: CPT

## 2024-09-29 PROCEDURE — 82962 GLUCOSE BLOOD TEST: CPT

## 2024-09-29 PROCEDURE — 97161 PT EVAL LOW COMPLEX 20 MIN: CPT

## 2024-09-29 PROCEDURE — 97165 OT EVAL LOW COMPLEX 30 MIN: CPT

## 2024-09-29 PROCEDURE — 97530 THERAPEUTIC ACTIVITIES: CPT

## 2024-09-29 PROCEDURE — 84100 ASSAY OF PHOSPHORUS: CPT

## 2024-09-29 PROCEDURE — 84443 ASSAY THYROID STIM HORMONE: CPT

## 2024-09-29 PROCEDURE — 36415 COLL VENOUS BLD VENIPUNCTURE: CPT

## 2024-09-29 PROCEDURE — 93005 ELECTROCARDIOGRAM TRACING: CPT

## 2024-09-29 PROCEDURE — 80048 BASIC METABOLIC PNL TOTAL CA: CPT

## 2024-09-29 PROCEDURE — 85025 COMPLETE CBC W/AUTO DIFF WBC: CPT

## 2024-09-29 PROCEDURE — 72125 CT NECK SPINE W/O DYE: CPT | Mod: MC

## 2024-09-29 PROCEDURE — 72128 CT CHEST SPINE W/O DYE: CPT | Mod: MC

## 2024-09-29 PROCEDURE — 82550 ASSAY OF CK (CPK): CPT

## 2024-09-29 PROCEDURE — 82607 VITAMIN B-12: CPT

## 2024-09-29 PROCEDURE — 99285 EMERGENCY DEPT VISIT HI MDM: CPT | Mod: 25

## 2024-09-29 PROCEDURE — 72170 X-RAY EXAM OF PELVIS: CPT

## 2024-09-29 PROCEDURE — 70450 CT HEAD/BRAIN W/O DYE: CPT | Mod: MC

## 2025-07-02 NOTE — PROGRESS NOTE ADULT - PROBLEM SELECTOR PLAN 8
Lab Results   Component Value Date    HGBA1C 9.6 (H) 05/26/2025   Per primary team   F-   E: replete K<4, Mg <2  N: DASH diet  DVT ppx: Lovenox  Dispo: [facility] F: as needed  E: replete K<4, Mg <2  N: DASH diet  DVT ppx: Lovenox  Dispo: [facility]